# Patient Record
Sex: MALE | Race: BLACK OR AFRICAN AMERICAN
[De-identification: names, ages, dates, MRNs, and addresses within clinical notes are randomized per-mention and may not be internally consistent; named-entity substitution may affect disease eponyms.]

---

## 2017-07-09 ENCOUNTER — HOSPITAL ENCOUNTER (EMERGENCY)
Dept: HOSPITAL 23 - CED | Age: 57
LOS: 1 days | Discharge: HOME | End: 2017-07-10
Payer: COMMERCIAL

## 2017-07-09 DIAGNOSIS — Z79.4: ICD-10-CM

## 2017-07-09 DIAGNOSIS — Z79.899: ICD-10-CM

## 2017-07-09 DIAGNOSIS — E78.5: ICD-10-CM

## 2017-07-09 DIAGNOSIS — I10: ICD-10-CM

## 2017-07-09 DIAGNOSIS — Z79.82: ICD-10-CM

## 2017-07-09 DIAGNOSIS — E11.9: ICD-10-CM

## 2017-07-09 DIAGNOSIS — R10.32: Primary | ICD-10-CM

## 2017-07-09 DIAGNOSIS — G40.909: ICD-10-CM

## 2017-07-09 DIAGNOSIS — Z98.890: ICD-10-CM

## 2017-07-09 DIAGNOSIS — R11.2: ICD-10-CM

## 2017-07-09 LAB
BARBITURATES UR QL SCN: 0.6 MG/DL (ref 0.2–2)
BARBITURATES UR QL SCN: 4.2 G/DL (ref 3.5–5)
BASOPHIL#: 0.1 X10E3 (ref 0–0.3)
BASOPHIL%: 0.6 % (ref 0–2.5)
BENZODIAZ UR QL SCN: 51 U/L (ref 10–42)
BENZODIAZ UR QL SCN: 65 U/L (ref 10–40)
BLOOD UREA NITROGEN: 15 MG/DL (ref 9–23)
BUN/CREATININE RATIO: 10.71
BZE UR QL SCN: 86 U/L (ref 32–92)
CALCIUM SERUM: 8.6 MG/DL (ref 8.4–10.2)
CK MB SERPL-RTO: 15.3 % (ref 11–15.5)
CK MB SERPL-RTO: 33.4 G/DL (ref 30–36)
CREATININE SERUM: 1.4 MG/DL (ref 0.6–1.4)
DIFF IND: NO
EOSINOPHIL#: 0.2 X10E3 (ref 0–0.7)
EOSINOPHIL%: 1.7 % (ref 0–7)
GENTAMICIN PEAK SERPL-MCNC: NO MG/L
GLOM FILT RATE ESTIMATED: 64.2 ML/MIN (ref 60–?)
GLUCOSE FASTING: 234 MG/DL (ref 70–110)
HEMATOCRIT: 40.6 % (ref 38–50)
HEMOGLOBIN: 13.5 GM/DL (ref 13–16)
KETONES UR QL: 101 MMOL/L (ref 100–111)
KETONES UR QL: 25 MMOL/L (ref 22–31)
LIPASE: 13 U/L (ref 22–51)
LYMPHOCYTE#: 1.7 X10E3 (ref 1–3.5)
LYMPHOCYTE%: 16.7 % (ref 17–45)
MEAN CELL VOLUME: 90 FL (ref 83–96)
MEAN CORPUSCULAR HEMOGLOBIN: 30 PG (ref 28–34)
MEAN PLATELET VOLUME: 10.9 FL (ref 6.5–11.5)
MONOCYTE#: 0.7 X10E3 (ref 0–1)
MONOCYTE%: 7.3 % (ref 3–12)
NEUTROPHIL#: 7.4 X10E3 (ref 1.5–7.1)
NEUTROPHIL%: 73.7 % (ref 40–75)
PLATELET COUNT: 156 X10E3 (ref 140–420)
POTASSIUM: 4 MMOL/L (ref 3.5–5.1)
PROTEIN TOTAL SERUM: 8 G/DL (ref 6–8.3)
RED BLOOD COUNT: 4.51 X10E (ref 3.9–5.6)
SODIUM: 134 MMOL/L (ref 135–145)
U HYALINE CASTS AUWI: (no result) /[LPF]
URBCS1 AUWI: (no result) /[HPF] (ref 0–2)
URINE APPEARANCE: CLEAR
URINE BACTERIA AUWI: (no result)
URINE BILIRUBIN: (no result)
URINE BLOOD: (no result)
URINE COLOR: (no result)
URINE GLUCOSE: >1000 MG/DL
URINE KETONE: (no result)
URINE LEUKOCYTE ESTERASE: (no result)
URINE NITRATE: (no result)
URINE PH: 5.5 (ref 5–8)
URINE PROTEIN: (no result)
URINE SOURCE: (no result)
URINE SPECIFIC GRAVITY: 1.02 (ref 1–1.03)
URINE SQUAMOUS EPITHELIAL CELL: (no result) /[HPF]
URINE UROBILINOGEN: 1 MG/DL
UWBCS1 AUWI: (no result) (ref 0–5)
WHITE BLOOD COUNT: 10 X10E3 (ref 4–10.5)

## 2020-04-03 VITALS — HEIGHT: 65 IN | WEIGHT: 230 LBS

## 2020-04-06 ENCOUNTER — OFFICE (OUTPATIENT)
Dept: URBAN - METROPOLITAN AREA CLINIC 75 | Facility: CLINIC | Age: 60
End: 2020-04-06
Payer: MEDICAID

## 2020-04-06 DIAGNOSIS — R13.10 DYSPHAGIA, UNSPECIFIED: ICD-10-CM

## 2020-04-06 PROCEDURE — 99202 OFFICE O/P NEW SF 15 MIN: CPT | Mod: 95 | Performed by: INTERNAL MEDICINE

## 2020-04-27 VITALS — HEIGHT: 65 IN | WEIGHT: 230 LBS

## 2020-04-28 ENCOUNTER — OFFICE (OUTPATIENT)
Dept: URBAN - METROPOLITAN AREA CLINIC 75 | Facility: CLINIC | Age: 60
End: 2020-04-28
Payer: MEDICAID

## 2020-04-28 DIAGNOSIS — Z12.11 ENCOUNTER FOR SCREENING FOR MALIGNANT NEOPLASM OF COLON: ICD-10-CM

## 2020-04-28 DIAGNOSIS — R13.10 DYSPHAGIA, UNSPECIFIED: ICD-10-CM

## 2020-04-28 DIAGNOSIS — D64.9 ANEMIA, UNSPECIFIED: ICD-10-CM

## 2020-04-28 PROCEDURE — 99213 OFFICE O/P EST LOW 20 MIN: CPT | Mod: 95 | Performed by: INTERNAL MEDICINE

## 2021-08-09 ENCOUNTER — OFFICE (OUTPATIENT)
Dept: URBAN - METROPOLITAN AREA CLINIC 75 | Facility: CLINIC | Age: 61
End: 2021-08-09
Payer: MEDICAID

## 2021-08-09 VITALS — WEIGHT: 204 LBS | SYSTOLIC BLOOD PRESSURE: 130 MMHG | HEIGHT: 65 IN | DIASTOLIC BLOOD PRESSURE: 84 MMHG

## 2021-08-09 DIAGNOSIS — D64.9 ANEMIA, UNSPECIFIED: ICD-10-CM

## 2021-08-09 DIAGNOSIS — R13.10 DYSPHAGIA, UNSPECIFIED: ICD-10-CM

## 2021-08-09 PROCEDURE — 99214 OFFICE O/P EST MOD 30 MIN: CPT | Performed by: NURSE PRACTITIONER

## 2021-09-10 VITALS
HEIGHT: 65 IN | DIASTOLIC BLOOD PRESSURE: 89 MMHG | TEMPERATURE: 97.2 F | RESPIRATION RATE: 16 BRPM | HEART RATE: 72 BPM | RESPIRATION RATE: 12 BRPM | DIASTOLIC BLOOD PRESSURE: 66 MMHG | RESPIRATION RATE: 8 BRPM | RESPIRATION RATE: 18 BRPM | SYSTOLIC BLOOD PRESSURE: 199 MMHG | OXYGEN SATURATION: 99 % | DIASTOLIC BLOOD PRESSURE: 80 MMHG | DIASTOLIC BLOOD PRESSURE: 108 MMHG | SYSTOLIC BLOOD PRESSURE: 172 MMHG | HEART RATE: 80 BPM | RESPIRATION RATE: 14 BRPM | OXYGEN SATURATION: 100 % | DIASTOLIC BLOOD PRESSURE: 54 MMHG | HEART RATE: 74 BPM | OXYGEN SATURATION: 97 % | HEART RATE: 81 BPM | TEMPERATURE: 97.6 F | SYSTOLIC BLOOD PRESSURE: 122 MMHG | OXYGEN SATURATION: 75 % | DIASTOLIC BLOOD PRESSURE: 65 MMHG | HEART RATE: 69 BPM | SYSTOLIC BLOOD PRESSURE: 154 MMHG | OXYGEN SATURATION: 98 % | RESPIRATION RATE: 15 BRPM | WEIGHT: 204 LBS | HEART RATE: 79 BPM | SYSTOLIC BLOOD PRESSURE: 91 MMHG | DIASTOLIC BLOOD PRESSURE: 112 MMHG | SYSTOLIC BLOOD PRESSURE: 116 MMHG | HEART RATE: 76 BPM | HEART RATE: 75 BPM | SYSTOLIC BLOOD PRESSURE: 185 MMHG | DIASTOLIC BLOOD PRESSURE: 86 MMHG | HEART RATE: 71 BPM | RESPIRATION RATE: 10 BRPM | SYSTOLIC BLOOD PRESSURE: 150 MMHG

## 2021-09-13 ENCOUNTER — AMBULATORY SURGICAL CENTER (OUTPATIENT)
Dept: URBAN - METROPOLITAN AREA SURGERY 17 | Facility: SURGERY | Age: 61
End: 2021-09-13
Payer: MEDICAID

## 2021-09-13 ENCOUNTER — OFFICE (OUTPATIENT)
Dept: URBAN - METROPOLITAN AREA PATHOLOGY 4 | Facility: PATHOLOGY | Age: 61
End: 2021-09-13
Payer: MEDICAID

## 2021-09-13 DIAGNOSIS — K21.00 GASTRO-ESOPHAGEAL REFLUX DISEASE WITH ESOPHAGITIS, WITHOUT B: ICD-10-CM

## 2021-09-13 DIAGNOSIS — Z53.8 PROCEDURE AND TREATMENT NOT CARRIED OUT FOR OTHER REASONS: ICD-10-CM

## 2021-09-13 DIAGNOSIS — K44.9 DIAPHRAGMATIC HERNIA WITHOUT OBSTRUCTION OR GANGRENE: ICD-10-CM

## 2021-09-13 DIAGNOSIS — K22.2 ESOPHAGEAL OBSTRUCTION: ICD-10-CM

## 2021-09-13 DIAGNOSIS — D37.1 NEOPLASM OF UNCERTAIN BEHAVIOR OF STOMACH: ICD-10-CM

## 2021-09-13 DIAGNOSIS — R13.10 DYSPHAGIA, UNSPECIFIED: ICD-10-CM

## 2021-09-13 DIAGNOSIS — Z12.11 ENCOUNTER FOR SCREENING FOR MALIGNANT NEOPLASM OF COLON: ICD-10-CM

## 2021-09-13 DIAGNOSIS — R10.13 EPIGASTRIC PAIN: ICD-10-CM

## 2021-09-13 PROBLEM — K22.8 OTHER SPECIFIED DISEASES OF ESOPHAGUS: Status: ACTIVE | Noted: 2021-09-13

## 2021-09-13 LAB
GI HISTOLOGY: A. UNSPECIFIED: (no result)
GI HISTOLOGY: PDF REPORT: (no result)

## 2021-09-13 PROCEDURE — 88305 TISSUE EXAM BY PATHOLOGIST: CPT | Performed by: INTERNAL MEDICINE

## 2021-09-13 PROCEDURE — 45330 DIAGNOSTIC SIGMOIDOSCOPY: CPT | Performed by: INTERNAL MEDICINE

## 2021-09-13 PROCEDURE — 43239 EGD BIOPSY SINGLE/MULTIPLE: CPT | Mod: 59 | Performed by: INTERNAL MEDICINE

## 2021-09-13 PROCEDURE — 43249 ESOPH EGD DILATION <30 MM: CPT | Performed by: INTERNAL MEDICINE

## 2021-11-22 VITALS
SYSTOLIC BLOOD PRESSURE: 181 MMHG | RESPIRATION RATE: 17 BRPM | OXYGEN SATURATION: 100 % | RESPIRATION RATE: 12 BRPM | DIASTOLIC BLOOD PRESSURE: 99 MMHG | DIASTOLIC BLOOD PRESSURE: 100 MMHG | DIASTOLIC BLOOD PRESSURE: 52 MMHG | HEART RATE: 78 BPM | TEMPERATURE: 97.6 F | DIASTOLIC BLOOD PRESSURE: 59 MMHG | HEART RATE: 73 BPM | DIASTOLIC BLOOD PRESSURE: 60 MMHG | SYSTOLIC BLOOD PRESSURE: 193 MMHG | DIASTOLIC BLOOD PRESSURE: 88 MMHG | SYSTOLIC BLOOD PRESSURE: 127 MMHG | DIASTOLIC BLOOD PRESSURE: 48 MMHG | SYSTOLIC BLOOD PRESSURE: 150 MMHG | HEART RATE: 65 BPM | SYSTOLIC BLOOD PRESSURE: 148 MMHG | SYSTOLIC BLOOD PRESSURE: 130 MMHG | HEIGHT: 65 IN | TEMPERATURE: 97.2 F | RESPIRATION RATE: 13 BRPM | RESPIRATION RATE: 18 BRPM | WEIGHT: 204 LBS | DIASTOLIC BLOOD PRESSURE: 67 MMHG | HEART RATE: 71 BPM | SYSTOLIC BLOOD PRESSURE: 195 MMHG | SYSTOLIC BLOOD PRESSURE: 212 MMHG | SYSTOLIC BLOOD PRESSURE: 147 MMHG | SYSTOLIC BLOOD PRESSURE: 152 MMHG | OXYGEN SATURATION: 99 % | HEART RATE: 69 BPM | DIASTOLIC BLOOD PRESSURE: 58 MMHG | RESPIRATION RATE: 15 BRPM | HEART RATE: 72 BPM | DIASTOLIC BLOOD PRESSURE: 84 MMHG | HEART RATE: 76 BPM | RESPIRATION RATE: 9 BRPM | RESPIRATION RATE: 11 BRPM

## 2021-11-29 ENCOUNTER — OFFICE (OUTPATIENT)
Dept: URBAN - METROPOLITAN AREA PATHOLOGY 4 | Facility: PATHOLOGY | Age: 61
End: 2021-11-29
Payer: MEDICAID

## 2021-11-29 ENCOUNTER — AMBULATORY SURGICAL CENTER (OUTPATIENT)
Dept: URBAN - METROPOLITAN AREA SURGERY 17 | Facility: SURGERY | Age: 61
End: 2021-11-29
Payer: MEDICAID

## 2021-11-29 DIAGNOSIS — D12.2 BENIGN NEOPLASM OF ASCENDING COLON: ICD-10-CM

## 2021-11-29 DIAGNOSIS — D12.4 BENIGN NEOPLASM OF DESCENDING COLON: ICD-10-CM

## 2021-11-29 DIAGNOSIS — K64.8 OTHER HEMORRHOIDS: ICD-10-CM

## 2021-11-29 DIAGNOSIS — Z12.11 ENCOUNTER FOR SCREENING FOR MALIGNANT NEOPLASM OF COLON: ICD-10-CM

## 2021-11-29 DIAGNOSIS — K57.30 DIVERTICULOSIS OF LARGE INTESTINE WITHOUT PERFORATION OR ABS: ICD-10-CM

## 2021-11-29 LAB
GI HISTOLOGY: A. UNSPECIFIED: (no result)
GI HISTOLOGY: B. UNSPECIFIED: (no result)
GI HISTOLOGY: PDF REPORT: (no result)

## 2021-11-29 PROCEDURE — 88305 TISSUE EXAM BY PATHOLOGIST: CPT | Performed by: INTERNAL MEDICINE

## 2021-11-29 PROCEDURE — 45385 COLONOSCOPY W/LESION REMOVAL: CPT | Mod: 33 | Performed by: INTERNAL MEDICINE

## 2021-11-29 NOTE — SERVICEHPINOTES
SUSI BORGES  is a  61  male   who presents today for a  Colonoscopy   for   the indications listed below. The updated Patient Profile was reviewed prior to the procedure, in conjunction with the Physical Exam, including medical conditions, surgical procedures, medications, allergies, family history and social history. See Physical Exam time stamp below for date and time of HPI completion.Pre-operatively, I reviewed the indication(s) for the procedure, the risks of the procedure [including but not limited to: unexpected bleeding possibly requiring hospitalization and/or unplanned repeat procedures, perforation possibly requiring surgical treatment, missed lesions and complications of sedation/MAC (also explained by anesthesia staff)]. I have evaluated the patient for risks associated with the planned anesthesia and the procedure to be performed and find the patient an acceptable candidate for IV sedation.Multiple opportunities were provided for any questions or concerns, and all questions were answered satisfactorily before any anesthesia was administered. We will proceed with the planned procedure.br

## 2021-11-29 NOTE — SERVICENOTES
The patient was informed that this exam is not 100% accurate, depending on many factors (such as the preparation). Small lesions can be missed. Please call if symptoms persist or new symptoms develop. The right colon was examined twice. Unfortunately, patient did not follow instructions for 2 day prep. Will plan to do OV prior to his next colonoscopy to go over prep instructions.

## 2022-01-13 ENCOUNTER — OFFICE (OUTPATIENT)
Dept: URBAN - METROPOLITAN AREA CLINIC 75 | Facility: CLINIC | Age: 62
End: 2022-01-13
Payer: MEDICAID

## 2022-01-13 VITALS
WEIGHT: 200 LBS | HEIGHT: 65 IN | SYSTOLIC BLOOD PRESSURE: 144 MMHG | HEART RATE: 61 BPM | OXYGEN SATURATION: 93 % | DIASTOLIC BLOOD PRESSURE: 62 MMHG

## 2022-01-13 DIAGNOSIS — R10.13 EPIGASTRIC PAIN: ICD-10-CM

## 2022-01-13 PROBLEM — K63.5 POLYP OF COLON: Status: ACTIVE | Noted: 2021-11-29

## 2022-01-13 PROCEDURE — 99214 OFFICE O/P EST MOD 30 MIN: CPT | Performed by: NURSE PRACTITIONER

## 2023-02-22 ENCOUNTER — HOSPITAL ENCOUNTER (OUTPATIENT)
Facility: HOSPITAL | Age: 63
Setting detail: OBSERVATION
Discharge: HOME | End: 2023-02-23
Attending: EMERGENCY MEDICINE | Admitting: HOSPITALIST
Payer: COMMERCIAL

## 2023-02-22 ENCOUNTER — APPOINTMENT (OUTPATIENT)
Dept: CARDIOLOGY | Facility: HOSPITAL | Age: 63
End: 2023-02-22
Payer: COMMERCIAL

## 2023-02-22 ENCOUNTER — APPOINTMENT (OUTPATIENT)
Dept: GENERAL RADIOLOGY | Facility: HOSPITAL | Age: 63
End: 2023-02-22
Payer: COMMERCIAL

## 2023-02-22 DIAGNOSIS — R07.9 CHEST PAIN, UNSPECIFIED TYPE: Primary | ICD-10-CM

## 2023-02-22 DIAGNOSIS — E11.65 POORLY CONTROLLED DIABETES MELLITUS: ICD-10-CM

## 2023-02-22 DIAGNOSIS — R73.9 HYPERGLYCEMIA: ICD-10-CM

## 2023-02-22 PROBLEM — N18.2 CKD (CHRONIC KIDNEY DISEASE) STAGE 2, GFR 60-89 ML/MIN: Status: ACTIVE | Noted: 2023-02-22

## 2023-02-22 PROBLEM — G40.909 SEIZURE DISORDER: Status: ACTIVE | Noted: 2023-02-22

## 2023-02-22 PROBLEM — E66.9 OBESITY (BMI 30-39.9): Status: ACTIVE | Noted: 2023-02-22

## 2023-02-22 PROBLEM — D63.8 ANEMIA, CHRONIC DISEASE: Status: ACTIVE | Noted: 2023-02-22

## 2023-02-22 PROBLEM — I10 HTN (HYPERTENSION): Status: ACTIVE | Noted: 2023-02-22

## 2023-02-22 LAB
ALBUMIN SERPL-MCNC: 4.3 G/DL (ref 3.5–5.2)
ALBUMIN/GLOB SERPL: 1.6 G/DL
ALP SERPL-CCNC: 69 U/L (ref 39–117)
ALT SERPL W P-5'-P-CCNC: 18 U/L (ref 1–41)
ANION GAP SERPL CALCULATED.3IONS-SCNC: 8.1 MMOL/L (ref 5–15)
ANION GAP SERPL CALCULATED.3IONS-SCNC: 9.6 MMOL/L (ref 5–15)
AST SERPL-CCNC: 12 U/L (ref 1–40)
BASOPHILS # BLD AUTO: 0.05 10*3/MM3 (ref 0–0.2)
BASOPHILS NFR BLD AUTO: 0.7 % (ref 0–1.5)
BILIRUB SERPL-MCNC: 0.3 MG/DL (ref 0–1.2)
BUN SERPL-MCNC: 16 MG/DL (ref 8–23)
BUN SERPL-MCNC: 18 MG/DL (ref 8–23)
BUN/CREAT SERPL: 12.3 (ref 7–25)
BUN/CREAT SERPL: 14.1 (ref 7–25)
CALCIUM SPEC-SCNC: 8.9 MG/DL (ref 8.6–10.5)
CALCIUM SPEC-SCNC: 9.1 MG/DL (ref 8.6–10.5)
CHLORIDE SERPL-SCNC: 101 MMOL/L (ref 98–107)
CHLORIDE SERPL-SCNC: 106 MMOL/L (ref 98–107)
CO2 SERPL-SCNC: 25.4 MMOL/L (ref 22–29)
CO2 SERPL-SCNC: 25.9 MMOL/L (ref 22–29)
CREAT SERPL-MCNC: 1.28 MG/DL (ref 0.76–1.27)
CREAT SERPL-MCNC: 1.3 MG/DL (ref 0.76–1.27)
DEPRECATED RDW RBC AUTO: 46.4 FL (ref 37–54)
DEPRECATED RDW RBC AUTO: 46.7 FL (ref 37–54)
EGFRCR SERPLBLD CKD-EPI 2021: 61.7 ML/MIN/1.73
EGFRCR SERPLBLD CKD-EPI 2021: 62.9 ML/MIN/1.73
EOSINOPHIL # BLD AUTO: 0.36 10*3/MM3 (ref 0–0.4)
EOSINOPHIL NFR BLD AUTO: 4.7 % (ref 0.3–6.2)
ERYTHROCYTE [DISTWIDTH] IN BLOOD BY AUTOMATED COUNT: 13.7 % (ref 12.3–15.4)
ERYTHROCYTE [DISTWIDTH] IN BLOOD BY AUTOMATED COUNT: 13.8 % (ref 12.3–15.4)
GEN 5 2HR TROPONIN T REFLEX: 25 NG/L
GLOBULIN UR ELPH-MCNC: 2.7 GM/DL
GLUCOSE BLDC GLUCOMTR-MCNC: 134 MG/DL (ref 70–130)
GLUCOSE BLDC GLUCOMTR-MCNC: 229 MG/DL (ref 70–130)
GLUCOSE BLDC GLUCOMTR-MCNC: 272 MG/DL (ref 70–130)
GLUCOSE BLDC GLUCOMTR-MCNC: 297 MG/DL (ref 70–130)
GLUCOSE SERPL-MCNC: 146 MG/DL (ref 65–99)
GLUCOSE SERPL-MCNC: 190 MG/DL (ref 65–99)
HBA1C MFR BLD: 12.3 % (ref 4.8–5.6)
HCT VFR BLD AUTO: 34.8 % (ref 37.5–51)
HCT VFR BLD AUTO: 35.5 % (ref 37.5–51)
HGB BLD-MCNC: 11.6 G/DL (ref 13–17.7)
HGB BLD-MCNC: 11.9 G/DL (ref 13–17.7)
LYMPHOCYTES # BLD AUTO: 1.83 10*3/MM3 (ref 0.7–3.1)
LYMPHOCYTES NFR BLD AUTO: 23.8 % (ref 19.6–45.3)
MAGNESIUM SERPL-MCNC: 2 MG/DL (ref 1.6–2.4)
MCH RBC QN AUTO: 31.2 PG (ref 26.6–33)
MCH RBC QN AUTO: 31.2 PG (ref 26.6–33)
MCHC RBC AUTO-ENTMCNC: 33.3 G/DL (ref 31.5–35.7)
MCHC RBC AUTO-ENTMCNC: 33.5 G/DL (ref 31.5–35.7)
MCV RBC AUTO: 92.9 FL (ref 79–97)
MCV RBC AUTO: 93.5 FL (ref 79–97)
MONOCYTES # BLD AUTO: 0.72 10*3/MM3 (ref 0.1–0.9)
MONOCYTES NFR BLD AUTO: 9.4 % (ref 5–12)
NEUTROPHILS NFR BLD AUTO: 4.69 10*3/MM3 (ref 1.7–7)
NEUTROPHILS NFR BLD AUTO: 60.9 % (ref 42.7–76)
PLATELET # BLD AUTO: 158 10*3/MM3 (ref 140–450)
PLATELET # BLD AUTO: 175 10*3/MM3 (ref 140–450)
PMV BLD AUTO: 11.3 FL (ref 6–12)
PMV BLD AUTO: 11.5 FL (ref 6–12)
POTASSIUM SERPL-SCNC: 4.3 MMOL/L (ref 3.5–5.2)
POTASSIUM SERPL-SCNC: 4.6 MMOL/L (ref 3.5–5.2)
PROT SERPL-MCNC: 7 G/DL (ref 6–8.5)
QT INTERVAL: 440 MS
RBC # BLD AUTO: 3.72 10*6/MM3 (ref 4.14–5.8)
RBC # BLD AUTO: 3.82 10*6/MM3 (ref 4.14–5.8)
SODIUM SERPL-SCNC: 135 MMOL/L (ref 136–145)
SODIUM SERPL-SCNC: 141 MMOL/L (ref 136–145)
TROPONIN T DELTA: 2 NG/L
TROPONIN T SERPL HS-MCNC: 23 NG/L
WBC NRBC COR # BLD: 7.45 10*3/MM3 (ref 3.4–10.8)
WBC NRBC COR # BLD: 7.69 10*3/MM3 (ref 3.4–10.8)

## 2023-02-22 PROCEDURE — 25010000002 ONDANSETRON PER 1 MG: Performed by: EMERGENCY MEDICINE

## 2023-02-22 PROCEDURE — G0378 HOSPITAL OBSERVATION PER HR: HCPCS

## 2023-02-22 PROCEDURE — 71045 X-RAY EXAM CHEST 1 VIEW: CPT

## 2023-02-22 PROCEDURE — 96375 TX/PRO/DX INJ NEW DRUG ADDON: CPT

## 2023-02-22 PROCEDURE — 36415 COLL VENOUS BLD VENIPUNCTURE: CPT

## 2023-02-22 PROCEDURE — 63710000001 INSULIN GLARGINE PER 5 UNITS: Performed by: NURSE PRACTITIONER

## 2023-02-22 PROCEDURE — 80053 COMPREHEN METABOLIC PANEL: CPT | Performed by: PHYSICIAN ASSISTANT

## 2023-02-22 PROCEDURE — 93005 ELECTROCARDIOGRAM TRACING: CPT

## 2023-02-22 PROCEDURE — 93306 TTE W/DOPPLER COMPLETE: CPT

## 2023-02-22 PROCEDURE — 93010 ELECTROCARDIOGRAM REPORT: CPT | Performed by: STUDENT IN AN ORGANIZED HEALTH CARE EDUCATION/TRAINING PROGRAM

## 2023-02-22 PROCEDURE — 96374 THER/PROPH/DIAG INJ IV PUSH: CPT

## 2023-02-22 PROCEDURE — 84484 ASSAY OF TROPONIN QUANT: CPT | Performed by: PHYSICIAN ASSISTANT

## 2023-02-22 PROCEDURE — 63710000001 INSULIN LISPRO (HUMAN) PER 5 UNITS: Performed by: NURSE PRACTITIONER

## 2023-02-22 PROCEDURE — 25010000002 MORPHINE PER 10 MG: Performed by: EMERGENCY MEDICINE

## 2023-02-22 PROCEDURE — 85025 COMPLETE CBC W/AUTO DIFF WBC: CPT | Performed by: PHYSICIAN ASSISTANT

## 2023-02-22 PROCEDURE — 83036 HEMOGLOBIN GLYCOSYLATED A1C: CPT | Performed by: NURSE PRACTITIONER

## 2023-02-22 PROCEDURE — 82962 GLUCOSE BLOOD TEST: CPT

## 2023-02-22 PROCEDURE — 85027 COMPLETE CBC AUTOMATED: CPT | Performed by: NURSE PRACTITIONER

## 2023-02-22 PROCEDURE — 83735 ASSAY OF MAGNESIUM: CPT | Performed by: PHYSICIAN ASSISTANT

## 2023-02-22 PROCEDURE — 99285 EMERGENCY DEPT VISIT HI MDM: CPT

## 2023-02-22 RX ORDER — PANTOPRAZOLE SODIUM 40 MG/1
40 TABLET, DELAYED RELEASE ORAL
Status: DISCONTINUED | OUTPATIENT
Start: 2023-02-22 | End: 2023-02-23 | Stop reason: HOSPADM

## 2023-02-22 RX ORDER — SODIUM CHLORIDE 0.9 % (FLUSH) 0.9 %
10 SYRINGE (ML) INJECTION AS NEEDED
Status: DISCONTINUED | OUTPATIENT
Start: 2023-02-22 | End: 2023-02-23

## 2023-02-22 RX ORDER — GABAPENTIN 400 MG/1
800 CAPSULE ORAL 3 TIMES DAILY PRN
COMMUNITY

## 2023-02-22 RX ORDER — PANTOPRAZOLE SODIUM 20 MG/1
20 TABLET, DELAYED RELEASE ORAL DAILY
COMMUNITY

## 2023-02-22 RX ORDER — ACETAMINOPHEN 325 MG/1
650 TABLET ORAL EVERY 4 HOURS PRN
Status: DISCONTINUED | OUTPATIENT
Start: 2023-02-22 | End: 2023-02-23

## 2023-02-22 RX ORDER — ONDANSETRON 4 MG/1
4 TABLET, FILM COATED ORAL EVERY 6 HOURS PRN
Status: DISCONTINUED | OUTPATIENT
Start: 2023-02-22 | End: 2023-02-23

## 2023-02-22 RX ORDER — AMLODIPINE BESYLATE 10 MG/1
10 TABLET ORAL DAILY
COMMUNITY

## 2023-02-22 RX ORDER — NITROGLYCERIN 0.4 MG/1
0.4 TABLET SUBLINGUAL
Status: DISCONTINUED | OUTPATIENT
Start: 2023-02-22 | End: 2023-02-23

## 2023-02-22 RX ORDER — HYDROCODONE BITARTRATE AND ACETAMINOPHEN 7.5; 325 MG/1; MG/1
1 TABLET ORAL EVERY 8 HOURS PRN
Status: DISCONTINUED | OUTPATIENT
Start: 2023-02-22 | End: 2023-02-23

## 2023-02-22 RX ORDER — ONDANSETRON 2 MG/ML
4 INJECTION INTRAMUSCULAR; INTRAVENOUS ONCE
Status: COMPLETED | OUTPATIENT
Start: 2023-02-22 | End: 2023-02-22

## 2023-02-22 RX ORDER — ACETAMINOPHEN 160 MG/5ML
650 SOLUTION ORAL EVERY 4 HOURS PRN
Status: DISCONTINUED | OUTPATIENT
Start: 2023-02-22 | End: 2023-02-23

## 2023-02-22 RX ORDER — ISOSORBIDE MONONITRATE 30 MG/1
120 TABLET, EXTENDED RELEASE ORAL DAILY
Status: DISCONTINUED | OUTPATIENT
Start: 2023-02-22 | End: 2023-02-23 | Stop reason: HOSPADM

## 2023-02-22 RX ORDER — ONDANSETRON 2 MG/ML
4 INJECTION INTRAMUSCULAR; INTRAVENOUS EVERY 6 HOURS PRN
Status: DISCONTINUED | OUTPATIENT
Start: 2023-02-22 | End: 2023-02-23

## 2023-02-22 RX ORDER — INSULIN LISPRO 100 [IU]/ML
0-9 INJECTION, SOLUTION INTRAVENOUS; SUBCUTANEOUS
Status: DISCONTINUED | OUTPATIENT
Start: 2023-02-22 | End: 2023-02-23 | Stop reason: HOSPADM

## 2023-02-22 RX ORDER — MORPHINE SULFATE 2 MG/ML
4 INJECTION, SOLUTION INTRAMUSCULAR; INTRAVENOUS ONCE
Status: COMPLETED | OUTPATIENT
Start: 2023-02-22 | End: 2023-02-22

## 2023-02-22 RX ORDER — INSULIN GLARGINE 100 [IU]/ML
30 INJECTION, SOLUTION SUBCUTANEOUS NIGHTLY
Status: DISCONTINUED | OUTPATIENT
Start: 2023-02-22 | End: 2023-02-22

## 2023-02-22 RX ORDER — SODIUM CHLORIDE 9 MG/ML
40 INJECTION, SOLUTION INTRAVENOUS AS NEEDED
Status: DISCONTINUED | OUTPATIENT
Start: 2023-02-22 | End: 2023-02-23

## 2023-02-22 RX ORDER — OMEPRAZOLE 40 MG/1
40 CAPSULE, DELAYED RELEASE ORAL DAILY
COMMUNITY
End: 2023-02-23 | Stop reason: HOSPADM

## 2023-02-22 RX ORDER — SODIUM CHLORIDE 0.9 % (FLUSH) 0.9 %
10 SYRINGE (ML) INJECTION EVERY 12 HOURS SCHEDULED
Status: DISCONTINUED | OUTPATIENT
Start: 2023-02-22 | End: 2023-02-23 | Stop reason: HOSPADM

## 2023-02-22 RX ORDER — CHLORTHALIDONE 25 MG/1
25 TABLET ORAL DAILY
Status: DISCONTINUED | OUTPATIENT
Start: 2023-02-22 | End: 2023-02-23 | Stop reason: HOSPADM

## 2023-02-22 RX ORDER — GABAPENTIN 400 MG/1
800 CAPSULE ORAL 3 TIMES DAILY PRN
Status: DISCONTINUED | OUTPATIENT
Start: 2023-02-22 | End: 2023-02-23

## 2023-02-22 RX ORDER — MONTELUKAST SODIUM 10 MG/1
10 TABLET ORAL NIGHTLY
Status: DISCONTINUED | OUTPATIENT
Start: 2023-02-22 | End: 2023-02-23 | Stop reason: HOSPADM

## 2023-02-22 RX ORDER — INSULIN GLARGINE 100 [IU]/ML
75 INJECTION, SOLUTION SUBCUTANEOUS NIGHTLY
COMMUNITY

## 2023-02-22 RX ORDER — LEVETIRACETAM 500 MG/1
500 TABLET ORAL 2 TIMES DAILY
COMMUNITY

## 2023-02-22 RX ORDER — ALLOPURINOL 100 MG/1
100 TABLET ORAL DAILY
Status: DISCONTINUED | OUTPATIENT
Start: 2023-02-22 | End: 2023-02-23 | Stop reason: HOSPADM

## 2023-02-22 RX ORDER — CLOPIDOGREL BISULFATE 75 MG/1
75 TABLET ORAL DAILY
COMMUNITY

## 2023-02-22 RX ORDER — CALCIUM CARBONATE 200(500)MG
2 TABLET,CHEWABLE ORAL 2 TIMES DAILY PRN
Status: DISCONTINUED | OUTPATIENT
Start: 2023-02-22 | End: 2023-02-23

## 2023-02-22 RX ORDER — METOPROLOL TARTRATE 50 MG/1
50 TABLET, FILM COATED ORAL 2 TIMES DAILY
COMMUNITY

## 2023-02-22 RX ORDER — INSULIN LISPRO 100 [IU]/ML
3 INJECTION, SOLUTION INTRAVENOUS; SUBCUTANEOUS
Status: DISCONTINUED | OUTPATIENT
Start: 2023-02-22 | End: 2023-02-23 | Stop reason: HOSPADM

## 2023-02-22 RX ORDER — NITROGLYCERIN 0.4 MG/1
0.4 TABLET SUBLINGUAL
Status: DISCONTINUED | OUTPATIENT
Start: 2023-02-22 | End: 2023-02-22 | Stop reason: SDUPTHER

## 2023-02-22 RX ORDER — ISOSORBIDE MONONITRATE 120 MG/1
120 TABLET, EXTENDED RELEASE ORAL DAILY
COMMUNITY

## 2023-02-22 RX ORDER — ALLOPURINOL 100 MG/1
100 TABLET ORAL DAILY
COMMUNITY

## 2023-02-22 RX ORDER — HYDROCODONE BITARTRATE AND ACETAMINOPHEN 7.5; 325 MG/1; MG/1
1 TABLET ORAL EVERY 8 HOURS PRN
COMMUNITY

## 2023-02-22 RX ORDER — CLOPIDOGREL BISULFATE 75 MG/1
75 TABLET ORAL DAILY
Status: DISCONTINUED | OUTPATIENT
Start: 2023-02-22 | End: 2023-02-23 | Stop reason: HOSPADM

## 2023-02-22 RX ORDER — TRAZODONE HYDROCHLORIDE 100 MG/1
100 TABLET ORAL NIGHTLY
Status: DISCONTINUED | OUTPATIENT
Start: 2023-02-22 | End: 2023-02-23 | Stop reason: HOSPADM

## 2023-02-22 RX ORDER — TRAZODONE HYDROCHLORIDE 100 MG/1
100 TABLET ORAL NIGHTLY
COMMUNITY

## 2023-02-22 RX ORDER — ATORVASTATIN CALCIUM 10 MG/1
10 TABLET, FILM COATED ORAL DAILY
Status: DISCONTINUED | OUTPATIENT
Start: 2023-02-22 | End: 2023-02-23 | Stop reason: HOSPADM

## 2023-02-22 RX ORDER — LOSARTAN POTASSIUM 100 MG/1
100 TABLET ORAL DAILY
Status: DISCONTINUED | OUTPATIENT
Start: 2023-02-22 | End: 2023-02-23 | Stop reason: HOSPADM

## 2023-02-22 RX ORDER — SODIUM CHLORIDE 0.9 % (FLUSH) 0.9 %
10 SYRINGE (ML) INJECTION AS NEEDED
Status: DISCONTINUED | OUTPATIENT
Start: 2023-02-22 | End: 2023-02-23 | Stop reason: HOSPADM

## 2023-02-22 RX ORDER — INSULIN LISPRO 100 [IU]/ML
10 INJECTION, SOLUTION INTRAVENOUS; SUBCUTANEOUS
COMMUNITY

## 2023-02-22 RX ORDER — ATORVASTATIN CALCIUM 10 MG/1
10 TABLET, FILM COATED ORAL DAILY
COMMUNITY

## 2023-02-22 RX ORDER — NICOTINE POLACRILEX 4 MG
15 LOZENGE BUCCAL
Status: DISCONTINUED | OUTPATIENT
Start: 2023-02-22 | End: 2023-02-23

## 2023-02-22 RX ORDER — CHLORTHALIDONE 25 MG/1
25 TABLET ORAL DAILY
COMMUNITY

## 2023-02-22 RX ORDER — METOCLOPRAMIDE 5 MG/1
5 TABLET ORAL
COMMUNITY
End: 2023-02-23 | Stop reason: HOSPADM

## 2023-02-22 RX ORDER — LEVETIRACETAM 500 MG/1
500 TABLET ORAL 2 TIMES DAILY
Status: DISCONTINUED | OUTPATIENT
Start: 2023-02-22 | End: 2023-02-23 | Stop reason: HOSPADM

## 2023-02-22 RX ORDER — IBUPROFEN 600 MG/1
1 TABLET ORAL AS NEEDED
Status: DISCONTINUED | OUTPATIENT
Start: 2023-02-22 | End: 2023-02-23

## 2023-02-22 RX ORDER — AMLODIPINE BESYLATE 10 MG/1
10 TABLET ORAL DAILY
Status: DISCONTINUED | OUTPATIENT
Start: 2023-02-22 | End: 2023-02-23 | Stop reason: HOSPADM

## 2023-02-22 RX ORDER — LOSARTAN POTASSIUM 50 MG/1
100 TABLET ORAL DAILY
COMMUNITY

## 2023-02-22 RX ORDER — DEXTROSE MONOHYDRATE 25 G/50ML
25 INJECTION, SOLUTION INTRAVENOUS
Status: DISCONTINUED | OUTPATIENT
Start: 2023-02-22 | End: 2023-02-23

## 2023-02-22 RX ORDER — MONTELUKAST SODIUM 10 MG/1
10 TABLET ORAL NIGHTLY
COMMUNITY

## 2023-02-22 RX ORDER — INSULIN LISPRO 100 [IU]/ML
0-9 INJECTION, SOLUTION INTRAVENOUS; SUBCUTANEOUS
Status: DISCONTINUED | OUTPATIENT
Start: 2023-02-22 | End: 2023-02-22

## 2023-02-22 RX ORDER — INSULIN GLARGINE 100 [IU]/ML
15 INJECTION, SOLUTION SUBCUTANEOUS 2 TIMES DAILY
COMMUNITY
End: 2023-02-23 | Stop reason: HOSPADM

## 2023-02-22 RX ADMIN — INSULIN LISPRO 4 UNITS: 100 INJECTION, SOLUTION INTRAVENOUS; SUBCUTANEOUS at 18:10

## 2023-02-22 RX ADMIN — CLOPIDOGREL 75 MG: 75 TABLET, FILM COATED ORAL at 10:31

## 2023-02-22 RX ADMIN — LEVETIRACETAM 500 MG: 500 TABLET, FILM COATED ORAL at 20:25

## 2023-02-22 RX ADMIN — INSULIN GLARGINE-YFGN 30 UNITS: 100 INJECTION, SOLUTION SUBCUTANEOUS at 12:18

## 2023-02-22 RX ADMIN — INSULIN LISPRO 6 UNITS: 100 INJECTION, SOLUTION INTRAVENOUS; SUBCUTANEOUS at 20:32

## 2023-02-22 RX ADMIN — TRAZODONE HYDROCHLORIDE 100 MG: 100 TABLET ORAL at 22:10

## 2023-02-22 RX ADMIN — GABAPENTIN 800 MG: 400 CAPSULE ORAL at 20:35

## 2023-02-22 RX ADMIN — ALLOPURINOL 100 MG: 100 TABLET ORAL at 10:30

## 2023-02-22 RX ADMIN — MONTELUKAST SODIUM 10 MG: 10 TABLET, FILM COATED ORAL at 20:25

## 2023-02-22 RX ADMIN — Medication 10 ML: at 20:28

## 2023-02-22 RX ADMIN — AMLODIPINE BESYLATE 10 MG: 10 TABLET ORAL at 10:31

## 2023-02-22 RX ADMIN — INSULIN GLARGINE-YFGN 30 UNITS: 100 INJECTION, SOLUTION SUBCUTANEOUS at 21:18

## 2023-02-22 RX ADMIN — METOPROLOL TARTRATE 50 MG: 25 TABLET, FILM COATED ORAL at 10:30

## 2023-02-22 RX ADMIN — MORPHINE SULFATE 4 MG: 2 INJECTION, SOLUTION INTRAMUSCULAR; INTRAVENOUS at 02:20

## 2023-02-22 RX ADMIN — ONDANSETRON 4 MG: 2 INJECTION INTRAMUSCULAR; INTRAVENOUS at 02:20

## 2023-02-22 RX ADMIN — INSULIN LISPRO 6 UNITS: 100 INJECTION, SOLUTION INTRAVENOUS; SUBCUTANEOUS at 12:23

## 2023-02-22 RX ADMIN — PANTOPRAZOLE SODIUM 40 MG: 40 TABLET, DELAYED RELEASE ORAL at 10:30

## 2023-02-22 RX ADMIN — LEVETIRACETAM 500 MG: 500 TABLET, FILM COATED ORAL at 10:31

## 2023-02-22 RX ADMIN — NITROGLYCERIN 0.4 MG: 0.4 TABLET SUBLINGUAL at 01:26

## 2023-02-22 RX ADMIN — METOPROLOL TARTRATE 50 MG: 25 TABLET, FILM COATED ORAL at 20:25

## 2023-02-22 RX ADMIN — Medication 10 ML: at 08:48

## 2023-02-22 RX ADMIN — ATORVASTATIN CALCIUM 10 MG: 10 TABLET, FILM COATED ORAL at 10:31

## 2023-02-22 RX ADMIN — INSULIN LISPRO 3 UNITS: 100 INJECTION, SOLUTION INTRAVENOUS; SUBCUTANEOUS at 12:23

## 2023-02-22 RX ADMIN — LOSARTAN POTASSIUM 100 MG: 100 TABLET, FILM COATED ORAL at 10:30

## 2023-02-22 RX ADMIN — INSULIN LISPRO 3 UNITS: 100 INJECTION, SOLUTION INTRAVENOUS; SUBCUTANEOUS at 18:10

## 2023-02-22 RX ADMIN — ISOSORBIDE MONONITRATE 120 MG: 30 TABLET, EXTENDED RELEASE ORAL at 10:31

## 2023-02-22 NOTE — ED NOTES
"Pt arrived via [facility] c/o chest pain that started approx 90mins ago. Pt reports the chest pain to be a mid-sternal stabbing chest pain. Pt has hx of multiple MI and snow. Pt took 1 sublingual nitro PTA with \"slight relief\"   "

## 2023-02-22 NOTE — PROGRESS NOTES
Discharge Planning Assessment  [facility]     Patient Name: Eric Simms  MRN: [id number]  Today's Date: 2/22/2023    Admit Date: 2/22/2023    Plan: Home with girlfriend   Discharge Needs Assessment     Row Name 02/22/23 1416       Living Environment    People in Home other (see comments)  girlfriend Rin Carballo    Current Living Arrangements home    Primary Care Provided by self    Provides Primary Care For no one    Family Caregiver if Needed none    Quality of Family Relationships supportive;involved;helpful    Able to Return to Prior Arrangements yes       Transition Planning    Patient/Family Anticipates Transition to home with family;home    Transportation Anticipated public transportation;health plan transportation       Discharge Needs Assessment    Equipment Currently Used at Home cane, straight    Concerns to be Addressed denies needs/concerns at this time;no discharge needs identified               Discharge Plan     Row Name 02/22/23 1418       Plan    Plan Home with girlfriend    Plan Comments Spoke with pt at bedside to screen for DCP/needs.  Pt did confirm that he does reside at [facility] address where he does live with his girlfriend and does plan to return back home at [place].  Pt reports that he does use a cane to assist with ambualtion.  Pt did deny ever using HH or SNF in the past.  Pt did confirm hi sPCP as Dr. Erlin Madrigal and he uses [facility] [address].  Pt denied having any issues getting or affording his home meds.  [facility] did provide pt with phone number for [employer] [employer] for pt is use under his [facility] for medical appointments after DC.  Pt did state that his girlfriend does not drive and he has no family in [place] and her will need transportation home at [place].   CCP will follow if any DC needs do arise.              Continued Care and Services - Admitted Since 2/22/2023    Coordination has not been started for this encounter.          Demographic Summary     Row  Name 02/22/23 1416       General Information    Admission Type observation    Arrived From home    Referral Source admission list    Reason for Consult discharge planning    Preferred Language English               Functional Status     Row Name 02/22/23 1416       Functional Status    Usual Activity Tolerance moderate    Current Activity Tolerance fair       Functional Status, IADL    Medications independent    Meal Preparation independent    Housekeeping independent    Laundry independent    Shopping independent               Psychosocial    No documentation.                Abuse/Neglect    No documentation.                Legal    No documentation.                Substance Abuse    No documentation.                Patient Forms    No documentation.                   OLGA Smith

## 2023-02-22 NOTE — CONSULTS
Eric Simms   63 y.o.  male    LOS: 0 days   Patient Care Team:  Erlin Madrigal MD as PCP - General (Family Medicine)      Subjective     Chief Complaint: chest pain    History of Present Illness:  Mr Simms is a 64 yo male who is a very poor historian who states his PCP is Dr Madrigal with PMH significant for hypertension, diabetes mellitus, coronary artery disease with PCI, and dyslipidemia who presented with c/o chest pain. He has had multiple admissions in january to various hospitals for problems, he reports CVA in 2023 as well is BS was > 500 and his Hemoglobin A1c > 13%.  He presents this admission stating that while he was seated watching the game he began having midsternal chest discomfort described as a pinching sensation that radiated to his left arm, his left hand started drawing up and his left leg felt weak. It was associated with soa and he took 1 ntg and called EMS. Pain was relieved with 1 ntg        PMH per review of old records  DM  CAD with PCI pDiag 2018, h/o LAD stent 2016  Htn  Dsyslipidemia  CKD stage 3  esophageal dilation x2  H/o CVA--Ct head 2022 - CT head: Old infarct in the left parietal lobe. Mild chronic small vessel ischemic change. No other evidence of acute abnormality.      CARDIAC CATHETERIZATION 2018  Angiographic Findings  Cardiac Arteries and Lesion Findings  LMCA: The left main gives rise to the LAD and circumflex. Left main is a long  vessel which is normal.  LAD: LAD is a medium caliber vessel which gives rise to a medium size proximal  diagonal before tapering at the apex. The LAD has a mid vessel stent which is  patent with diffuse 40-50% in-stent restenosis. The distal LAD is diffusely  diseased with serial lesions of 50-60% stenosis. The diagonal branch has a  long proximal and mid 90% stenosis.  Lesion on 1st Dia% stenosis reduced to 0%. Pre procedure SAVANNA III flow  was noted. Good runoff was present. The lesion was diagnosed as High Risk  (C).  Culprit lesion.  LCx: The circumflex is a small caliber system which is essentially an AV  groove type vessel. The circumflex is normal.  RCA: The RCA is a large caliber dominant vessel which gives rise to a large  bifurcating posterolateral branch and a small caliber PDA branch. The RCA has  20-30% mid stenoses and 30-40% mid posterior lateral stenosis at its  Bifurcation.  Ventriculography Findings  Normal wall motion.  No mitral regurgitation.  Estimated ejection fraction 55-60%.  Diagnostic Summary/Impression  Successful Synergy RISHABH PCI to the proximal diagonal branch of the left  anterior descending x 1 .  Moderate diffuse in-stent restenosis of previously placed mid LAD stent with  diffuse moderate disease of the distal LAD.  Normal left ventricular wall motion and systolic function.  No mitral regurgitation or aortic stenosis.  Interventional Summary  Intervention to the proximal diagonal branch of the LAD .  Pre-dilation with 2.25 x 15 mm NC balloon with significant balloon sliding so  a balloon so a 2.5 x 15 mm Angiosculpt was used.  Stenting with 2.5 x 28 mm Synergy RISHABH .  Post dilated with a 2.75 x 15 mm NC balloon      Summary. Echo 7/23/18   The ejection fraction biplane was calculated at 61%   Left ventricular chamber size is mildly enlarged.   Mild left ventricular hypertrophy.   Overall left ventricular function is normal.   Normal diastolic filling pattern for age.   No significant valvular abnormalities.   Trace mitral regurgitation is present.   The aortic root appears normal.   Aortic root measures 3.6 cm    7/23/18  Abnormal pharmacological stress SPECT Myocardial Perfusion Imaging.   There is a medium sized mild-moderate severity reversible perfusion defect(s) of the   basal anterolateral, mid-anteroseptal wall consistent with ischemia.   Perfusion data correlates with quantitative imaging techniques.   Normal TID ratio. TID 1.14 .   Summary of LV Function   Gated SPECT analysis demonstrates a  post stress ejection fraction of 61 %.   Global LV systolic function is normal .       Past Medical History:   Diagnosis Date   • Coronary artery disease    • Diabetes mellitus (HCC)    • GERD (gastroesophageal reflux disease)    • Hyperlipidemia    • Hypertension    • Peripheral neuropathy    • Seizures (HCC)    • Stroke (HCC)      History reviewed. No pertinent surgical history.  Medications Prior to Admission   Medication Sig Dispense Refill Last Dose   • allopurinol (ZYLOPRIM) 100 MG tablet Take 100 mg by mouth Daily.   2/21/2023   • amLODIPine (NORVASC) 10 MG tablet Take 10 mg by mouth Daily.   2/21/2023   • atorvastatin (LIPITOR) 10 MG tablet Take 10 mg by mouth Daily.   2/21/2023   • chlorthalidone (HYGROTON) 25 MG tablet Take 25 mg by mouth Daily.   2/21/2023   • clopidogrel (PLAVIX) 75 MG tablet Take 75 mg by mouth Daily.   2/21/2023   • gabapentin (NEURONTIN) 400 MG capsule Take 800 mg by mouth 3 (Three) Times a Day As Needed.   2/21/2023   • HYDROcodone-acetaminophen (NORCO) 7.5-325 MG per tablet Take 1 tablet by mouth Every 8 (Eight) Hours As Needed for Moderate Pain.   2/21/2023   • insulin glargine (LANTUS, SEMGLEE) 100 UNIT/ML injection Inject 75 Units under the skin into the appropriate area as directed Every Night.   2/21/2023   • insulin glargine (LANTUS, SEMGLEE) 100 UNIT/ML injection Inject 15 Units under the skin into the appropriate area as directed 2 (Two) Times a Day.   2/21/2023   • Insulin Lispro (humaLOG) 100 UNIT/ML injection Inject 10 Units under the skin into the appropriate area as directed 3 (Three) Times a Day Before Meals.   2/21/2023   • isosorbide mononitrate (IMDUR) 120 MG 24 hr tablet Take 120 mg by mouth Daily.   2/21/2023   • levETIRAcetam (KEPPRA) 500 MG tablet Take 500 mg by mouth 2 (Two) Times a Day.   2/21/2023   • losartan (COZAAR) 50 MG tablet Take 100 mg by mouth Daily.   2/21/2023   • metoprolol tartrate (LOPRESSOR) 50 MG tablet Take 50 mg by mouth 2 (Two) Times a Day.    2/21/2023   • montelukast (SINGULAIR) 10 MG tablet Take 10 mg by mouth Every Night.   2/21/2023   • omeprazole (priLOSEC) 40 MG capsule Take 40 mg by mouth Daily.   2/21/2023   • pantoprazole (PROTONIX) 20 MG EC tablet Take 20 mg by mouth Daily.   2/21/2023   • traZODone (DESYREL) 100 MG tablet Take 100 mg by mouth Every Night.   2/21/2023       History reviewed. No pertinent family history.  Social History     Socioeconomic History   • Marital status: Single   Tobacco Use   • Smoking status: Never   • Smokeless tobacco: Never   Vaping Use   • Vaping Use: Never used   Substance and Sexual Activity   • Alcohol use: Not Currently   • Drug use: Not Currently     Comment: crack - last use 15 years ago   • Sexual activity: Defer     Objective       Review of Systems:   Constitutional: Negative for diaphoresis, fatigue, fever and unexpected weight change.   HENT: Negative.    Eyes: Negative.    Respiratory: Negative for cough, shortness of breath and wheezing.    Cardiovascular: Negative for chest pain, palpitations and leg swelling.   Gastrointestinal: Negative for abdominal pain, blood in stool, constipation, diarrhea, nausea and vomiting.   Endocrine: Negative.    Genitourinary: Negative for difficulty urinating, dysuria and frequency.   Musculoskeletal: Negative.    Skin: Negative.    Allergic/Immunologic: Negative for environmental allergies and food allergies.   Neurological: Negative.    Hematological: Negative.    Psychiatric/Behavioral: Negative.        Current Facility-Administered Medications:   •  acetaminophen (TYLENOL) tablet 650 mg, 650 mg, Oral, Q4H PRN **OR** acetaminophen (TYLENOL) 160 MG/5ML solution 650 mg, 650 mg, Oral, Q4H PRN **OR** acetaminophen (TYLENOL) suppository 650 mg, 650 mg, Rectal, Q4H PRN, Leonela Zelaya, BUNNY  •  calcium carbonate (TUMS) chewable tablet 500 mg (200 mg elemental), 2 tablet, Oral, BID PRN, Leonela Zelaya APRN  •  dextrose (D50W) (25 g/50 mL) IV injection 25  g, 25 g, Intravenous, Q15 Min PRN, Leonela Zelaya APRN  •  dextrose (GLUTOSE) oral gel 15 g, 15 g, Oral, Q15 Min PRN, Leonela Zelaya APRN  •  glucagon (GLUCAGEN) injection 1 mg, 1 mg, Subcutaneous, PRN, Leonela Zelaya APRN  •  insulin lispro (ADMELOG) injection 0-9 Units, 0-9 Units, Subcutaneous, 4x Daily With Meals & Nightly, Leonela Zelaya APRN  •  nitroglycerin (NITROSTAT) SL tablet 0.4 mg, 0.4 mg, Sublingual, Q5 Min PRN, Leonela Zelaya APRN  •  ondansetron (ZOFRAN) tablet 4 mg, 4 mg, Oral, Q6H PRN **OR** ondansetron (ZOFRAN) injection 4 mg, 4 mg, Intravenous, Q6H PRN, Leonela Zelaya APRN  •  [COMPLETED] Insert Peripheral IV, , , Once **AND** sodium chloride 0.9 % flush 10 mL, 10 mL, Intravenous, PRN, James Thomas III, PA  •  sodium chloride 0.9 % flush 10 mL, 10 mL, Intravenous, Q12H, Leonela Zelaya APRN  •  sodium chloride 0.9 % flush 10 mL, 10 mL, Intravenous, PRN, Leonela Zelaya APRN  •  sodium chloride 0.9 % infusion 40 mL, 40 mL, Intravenous, PRN, Leonela Zelaya APRN      Physical Exam:   Vital Sign Min/Max for last 24 hours  Temp  Min: 98.2 °F (36.8 °C)  Max: 98.2 °F (36.8 °C)   BP  Min: 142/78  Max: 172/90    Pulse  Min: 58  Max: 68     Wt Readings from Last 3 Encounters:   02/22/23 97.5 kg (214 lb 14.4 oz)       General Appearance:  Awake,  Alert, cooperative, cfhr debilitated male in no acute distress   Head:  Normocephalic, without obvious abnormality, atraumatic   Eyes:          Conjunctivae normal, anicteric, eom intact    Neck: No adenopathy, supple, trachea midline, no thyromegaly, no   carotid bruit, no JVD, no elevated cvp   Lungs:   Clear to auscultation,respirations regular, even and                  unlabored    Heart:  Regular rhythm and normal rate, normal S1 and S2,            No murmur, no gallop, no rub, no click    Chest Wall:  No abnormalities observed   Abdomen:   Normal bowel sounds, no masses, soft nontender,  nondistended                    Rectal:   Deferred   Extremities: No edema. Moves all extremities well, no cyanosis, no erythema   Pulses: Pulses palpable and equal bilaterally   Skin: No bleeding, bruising or rash   Neurologic: Speech clear and appropriate, no facial drooping     : voids      MONITOR: nsr    Results Review:     Sodium Sodium   Date Value Ref Range Status   02/22/2023 141 136 - 145 mmol/L Final   02/22/2023 135 (L) 136 - 145 mmol/L Final      Potassium Potassium   Date Value Ref Range Status   02/22/2023 4.6 3.5 - 5.2 mmol/L Final   02/22/2023 4.3 3.5 - 5.2 mmol/L Final      Chloride Chloride   Date Value Ref Range Status   02/22/2023 106 98 - 107 mmol/L Final   02/22/2023 101 98 - 107 mmol/L Final      Bicarbonate No results found for: PLASMABICARB   BUN BUN   Date Value Ref Range Status   02/22/2023 16 8 - 23 mg/dL Final   02/22/2023 18 8 - 23 mg/dL Final      Creatinine Creatinine   Date Value Ref Range Status   02/22/2023 1.30 (H) 0.76 - 1.27 mg/dL Final   02/22/2023 1.28 (H) 0.76 - 1.27 mg/dL Final      Calcium Calcium   Date Value Ref Range Status   02/22/2023 8.9 8.6 - 10.5 mg/dL Final   02/22/2023 9.1 8.6 - 10.5 mg/dL Final      Magnesium Magnesium   Date Value Ref Range Status   02/22/2023 2.0 1.6 - 2.4 mg/dL Final        Results from last 7 days   Lab Units 02/22/23  0648   WBC 10*3/mm3 7.45   HEMOGLOBIN g/dL 11.9*   HEMATOCRIT % 35.5*   PLATELETS 10*3/mm3 175     Lab Results   Lab Value Date/Time    TROPONINT 25 (H) 02/22/2023 0648    TROPONINT 23 (H) 02/22/2023 0223     No results found for: CHOL  Lab Results   Component Value Date    HDL 27 (L) 08/02/2018     Lab Results   Component Value Date     08/02/2018     Lab Results   Component Value Date    TRIG 302 (H) 08/02/2018     No components found for: CHOLHDL  No results found for: PTT  No components found for: PT/INR  Lab Results   Component Value Date    HGBA1C 12.30 (H) 02/22/2023      No results found for: TSH     Echo EF  Estimated  )No results found for: ECHOEFEST      Assessment/ Plan    Active Hospital Problems    Diagnosis  POA   • **Chest pain, unspecified type [R07.9]  Yes     Priority: Low       1. Chest pain  Hs TROP 25<- 23  CXR CARDIOMEGALY, POSSIBLE VASCULAR CONGESTION  Echo done 12/2022 unable to retrieve results      2. CAD with PCI pDiag 8/2/2018, h/o LAD stent 11/13/2016    3. DM  Poor control hga1c 12.3    4. Htn    5. Dsyslipidemia    PLAN  Atypical cp with trop neg X2 No MI, no ischemic changes on 12 lead ekg, check 2d echo, lexiscan stress in am(he has eaten breakfast)  He has follow up appt with dr del rio 5/10/2023     Kennedi Suarez, APRN  02/22/23  08:45 EST    Discussed with hunter  Time: 1 hour    Patient seen and examined  Heart S1 and S2 normal lungs clear    Patient scheduled for Lexiscan and an echocardiogram

## 2023-02-22 NOTE — H&P
Patient Name:  Eric Simms  YOB: 1960  MRN:  [id number]  Admit Date:  2/22/2023  Patient Care Team:  Erlin Madrigal MD as PCP - General (Family Medicine)      Subjective   History Present Illness     Chief Complaint   Patient presents with   • Chest Pain       Mr. Simms is a 63 y.o. former smoker with a history of diabetes mellitus type 2, hypertension, hyperlipidemia, GERD, peripheral neuropathy, seizures, CVA that presents to [facility] complaining of chest pain.    Plan for EGD with dilatation at [place] at end of January 2023 & instead went to ER for elevated blood sugar.      Patient states intermittent, alternating chest pain since end of December 2022 & previously evaluated at [facility]; however, limited historian regarding medical history.  Previously evaluated by Dr. Johnson.     Watching a basketball game last night on 2/21/2023 & developed intractable chest pain described as paralyzing; therefore, family notified EMS.    Took nitro x1 with slight relief.    Recommendation pending hospital course.  Details below.    History of Present Illness  Review of Systems   Constitutional: Negative for chills and fever.   HENT: Negative for congestion and rhinorrhea.    Respiratory: Negative for cough and shortness of breath.    Cardiovascular: Positive for chest pain. Negative for leg swelling.   Gastrointestinal: Negative for abdominal pain, constipation, diarrhea, nausea and vomiting.   Endocrine: Negative for polydipsia, polyphagia and polyuria.   Genitourinary: Negative for difficulty urinating and dysuria.   Musculoskeletal: Positive for gait problem (due to generalized weakness). Negative for myalgias.   Skin: Negative for rash and wound.   Neurological: Positive for dizziness. Negative for syncope and light-headedness.   Psychiatric/Behavioral: Negative for confusion and hallucinations.        Personal History     Past Medical History:   Diagnosis Date   •  Coronary artery disease    • Diabetes mellitus (HCC)    • GERD (gastroesophageal reflux disease)    • Hyperlipidemia    • Hypertension    • Peripheral neuropathy    • Seizures (HCC)    • Stroke (HCC)      History reviewed. No pertinent surgical history.  History reviewed. No pertinent family history.  Social History     Tobacco Use   • Smoking status: Never   • Smokeless tobacco: Never   Vaping Use   • Vaping Use: Never used   Substance Use Topics   • Alcohol use: Not Currently   • Drug use: Not Currently     Comment: crack - last use 15 years ago     No current facility-administered medications on file prior to encounter.     Current Outpatient Medications on File Prior to Encounter   Medication Sig Dispense Refill   • allopurinol (ZYLOPRIM) 100 MG tablet Take 100 mg by mouth Daily.     • amLODIPine (NORVASC) 10 MG tablet Take 10 mg by mouth Daily.     • atorvastatin (LIPITOR) 10 MG tablet Take 10 mg by mouth Daily.     • chlorthalidone (HYGROTON) 25 MG tablet Take 25 mg by mouth Daily.     • clopidogrel (PLAVIX) 75 MG tablet Take 75 mg by mouth Daily.     • gabapentin (NEURONTIN) 400 MG capsule Take 800 mg by mouth 3 (Three) Times a Day As Needed.     • HYDROcodone-acetaminophen (NORCO) 7.5-325 MG per tablet Take 1 tablet by mouth Every 8 (Eight) Hours As Needed for Moderate Pain.     • insulin glargine (LANTUS, SEMGLEE) 100 UNIT/ML injection Inject 75 Units under the skin into the appropriate area as directed Every Night.     • insulin glargine (LANTUS, SEMGLEE) 100 UNIT/ML injection Inject 15 Units under the skin into the appropriate area as directed 2 (Two) Times a Day.     • Insulin Lispro (humaLOG) 100 UNIT/ML injection Inject 10 Units under the skin into the appropriate area as directed 3 (Three) Times a Day Before Meals.     • isosorbide mononitrate (IMDUR) 120 MG 24 hr tablet Take 120 mg by mouth Daily.     • levETIRAcetam (KEPPRA) 500 MG tablet Take 500 mg by mouth 2 (Two) Times a Day.     • losartan  (COZAAR) 50 MG tablet Take 100 mg by mouth Daily.     • metFORMIN (GLUCOPHAGE) 500 MG tablet Take 500 mg by mouth 2 (Two) Times a Day With Meals.     • metoclopramide (REGLAN) 5 MG tablet Take 5 mg by mouth 4 (Four) Times a Day Before Meals & at Bedtime.     • metoprolol tartrate (LOPRESSOR) 50 MG tablet Take 50 mg by mouth 2 (Two) Times a Day.     • montelukast (SINGULAIR) 10 MG tablet Take 10 mg by mouth Every Night.     • omeprazole (priLOSEC) 40 MG capsule Take 40 mg by mouth Daily.     • pantoprazole (PROTONIX) 20 MG EC tablet Take 20 mg by mouth Daily.     • traZODone (DESYREL) 100 MG tablet Take 100 mg by mouth Every Night.       No Known Allergies    Objective    Objective     Vital Signs  Temp:  [98.2 °F (36.8 °C)] 98.2 °F (36.8 °C)  Heart Rate:  [58-68] 67  Resp:  [16] 16  BP: (142-172)/(74-92) 152/74  SpO2:  [92 %-99 %] 99 %  on   ;   Device (Oxygen Therapy): room air  Body mass index is 35.61 kg/m².    Physical Exam  Constitutional:       General: He is not in acute distress.     Appearance: He is obese. He is not toxic-appearing.   HENT:      Head: Normocephalic and atraumatic.   Eyes:      Extraocular Movements: Extraocular movements intact.      Conjunctiva/sclera: Conjunctivae normal.   Cardiovascular:      Rate and Rhythm: Normal rate.      Heart sounds: Normal heart sounds.   Pulmonary:      Effort: Pulmonary effort is normal.      Breath sounds: Normal breath sounds.   Abdominal:      General: Bowel sounds are normal.      Palpations: Abdomen is soft.   Musculoskeletal:      Cervical back: Normal range of motion and neck supple.      Right lower leg: Edema (non-pitting edema, BLE) present.      Left lower leg: Edema present.   Skin:     General: Skin is warm and dry.   Neurological:      Mental Status: He is alert and oriented to person, place, and time.      Cranial Nerves: No cranial nerve deficit.         Results Review:  I reviewed the patient's new clinical results.  I reviewed the patient's  new imaging results and agree with the interpretation.  I reviewed the patient's other test results and agree with the interpretation  I personally viewed and interpreted the patient's EKG/Telemetry data  Discussed with ED provider.    Lab Results (last 24 hours)     Procedure Component Value Units Date/Time    Magnesium [[id number]]  (Normal) Collected: 02/22/23 0115    Specimen: Blood from Arm, Right Updated: 02/22/23 0147     Magnesium 2.0 mg/dL     CBC & Differential [[id number]]  (Abnormal) Collected: 02/22/23 0223    Specimen: Blood from Hand, Right Updated: 02/22/23 0251    Narrative:      The following orders were created for panel order CBC & Differential.  Procedure                               Abnormality         Status                     ---------                               -----------         ------                     CBC Auto Differential[[id number]]        Abnormal            Final result                 Please view results for these tests on the individual orders.    Comprehensive Metabolic Panel [[id number]]  (Abnormal) Collected: 02/22/23 0223    Specimen: Blood from Hand, Right Updated: 02/22/23 0303     Glucose 190 mg/dL      BUN 18 mg/dL      Creatinine 1.28 mg/dL      Sodium 135 mmol/L      Potassium 4.3 mmol/L      Chloride 101 mmol/L      CO2 25.9 mmol/L      Calcium 9.1 mg/dL      Total Protein 7.0 g/dL      Albumin 4.3 g/dL      ALT (SGPT) 18 U/L      AST (SGOT) 12 U/L      Alkaline Phosphatase 69 U/L      Total Bilirubin 0.3 mg/dL      Globulin 2.7 gm/dL      A/G Ratio 1.6 g/dL      BUN/Creatinine Ratio 14.1     Anion Gap 8.1 mmol/L      eGFR 62.9 mL/min/1.73     Narrative:      GFR Normal >60  Chronic Kidney Disease <60  Kidney Failure <15      High Sensitivity Troponin T [[id number]]  (Abnormal) Collected: 02/22/23 0223    Specimen: Blood from Hand, Right Updated: 02/22/23 0303     HS Troponin T 23 ng/L     Narrative:      High Sensitive Troponin T Reference Range:  <10.0 ng/L- Negative  Female for AMI  <15.0 ng/L- Negative Male for AMI  >=10 - Abnormal Female indicating possible myocardial injury.  >=15 - Abnormal Male indicating possible myocardial injury.   Clinicians would have to utilize clinical acumen, EKG, Troponin, and serial changes to determine if it is an Acute Myocardial Infarction or myocardial injury due to an underlying chronic condition.         CBC Auto Differential [[id number]]  (Abnormal) Collected: 02/22/23 0223    Specimen: Blood from Hand, Right Updated: 02/22/23 0251     WBC 7.69 10*3/mm3      RBC 3.72 10*6/mm3      Hemoglobin 11.6 g/dL      Hematocrit 34.8 %      MCV 93.5 fL      MCH 31.2 pg      MCHC 33.3 g/dL      RDW 13.7 %      RDW-SD 46.7 fl      MPV 11.5 fL      Platelets 158 10*3/mm3      Neutrophil % 60.9 %      Lymphocyte % 23.8 %      Monocyte % 9.4 %      Eosinophil % 4.7 %      Basophil % 0.7 %      Neutrophils, Absolute 4.69 10*3/mm3      Lymphocytes, Absolute 1.83 10*3/mm3      Monocytes, Absolute 0.72 10*3/mm3      Eosinophils, Absolute 0.36 10*3/mm3      Basophils, Absolute 0.05 10*3/mm3     POC Glucose Once [[id number]]  (Abnormal) Collected: 02/22/23 0639    Specimen: Blood Updated: 02/22/23 0640     Glucose 134 mg/dL      Comment: Meter: [id number] : [id number] Morris CLEMENTS       High Sensitivity Troponin T 2Hr [[id number]]  (Abnormal) Collected: 02/22/23 0648    Specimen: Blood Updated: 02/22/23 0726     HS Troponin T 25 ng/L      Troponin T Delta 2 ng/L     Narrative:      High Sensitive Troponin T Reference Range:  <10.0 ng/L- Negative Female for AMI  <15.0 ng/L- Negative Male for AMI  >=10 - Abnormal Female indicating possible myocardial injury.  >=15 - Abnormal Male indicating possible myocardial injury.   Clinicians would have to utilize clinical acumen, EKG, Troponin, and serial changes to determine if it is an Acute Myocardial Infarction or myocardial injury due to an underlying chronic condition.         Basic Metabolic Panel [[id number]]   (Abnormal) Collected: 02/22/23 0648    Specimen: Blood Updated: 02/22/23 0726     Glucose 146 mg/dL      BUN 16 mg/dL      Creatinine 1.30 mg/dL      Sodium 141 mmol/L      Potassium 4.6 mmol/L      Chloride 106 mmol/L      CO2 25.4 mmol/L      Calcium 8.9 mg/dL      BUN/Creatinine Ratio 12.3     Anion Gap 9.6 mmol/L      eGFR 61.7 mL/min/1.73     Narrative:      GFR Normal >60  Chronic Kidney Disease <60  Kidney Failure <15      CBC (No Diff) [[id number]]  (Abnormal) Collected: 02/22/23 0648    Specimen: Blood Updated: 02/22/23 0701     WBC 7.45 10*3/mm3      RBC 3.82 10*6/mm3      Hemoglobin 11.9 g/dL      Hematocrit 35.5 %      MCV 92.9 fL      MCH 31.2 pg      MCHC 33.5 g/dL      RDW 13.8 %      RDW-SD 46.4 fl      MPV 11.3 fL      Platelets 175 10*3/mm3     Hemoglobin A1c [[id number]]  (Abnormal) Collected: 02/22/23 0648    Specimen: Blood Updated: 02/22/23 0711     Hemoglobin A1C 12.30 %     Narrative:      Hemoglobin A1C Ranges:    Increased Risk for Diabetes  5.7% to 6.4%  Diabetes                     >= 6.5%  Diabetic Goal                < 7.0%    POC Glucose Once [[id number]]  (Abnormal) Collected: 02/22/23 1059    Specimen: Blood Updated: 02/22/23 1100     Glucose 272 mg/dL      Comment: Meter: [id number] : [id number] Franklyn Bruce PCA             Imaging Results (Last 24 Hours)     Procedure Component Value Units Date/Time    XR Chest 1 View [[id number]] Collected: 02/22/23 0129     Updated: 02/22/23 0133    Narrative:      SINGLE VIEW OF THE CHEST     HISTORY: Chest pain     COMPARISON: None available.     FINDINGS:  Cardiomegaly is present. There may be some vascular congestion. There is  calcification of the aorta. No pneumothorax, pleural effusion, or acute  infiltrate is seen.       Impression:      Cardiomegaly. Possible vascular congestion.     This report was finalized on 2/22/2023 1:30 AM by Dr. Kamala Carrasco M.D.                 ECG 12 Lead Chest Pain   Final Result   HEART RATE= 66  bpm    RR Interval= 909  ms   IN Interval= 184  ms   P Horizontal Axis= 18  deg   P Front Axis= 48  deg   QRSD Interval= 114  ms   QT Interval= 440  ms   QRS Axis= 72  deg   T Wave Axis= 29  deg   - OTHERWISE NORMAL ECG -   Sinus rhythm   Borderline intraventricular conduction delay   No Prior Tracing for Comparison   Electronically Signed By: Rc Gama ([facility]) 22-Feb-2023 11:56:41   Date and Time of Study: 2023-02-22 00:44:22           Assessment/Plan     Active Hospital Problems    Diagnosis  POA   • **Chest pain, unspecified type [R07.9]  Yes   • Type 2 diabetes mellitus with hyperglycemia (HCC) [E11.65]  Yes   • HTN (hypertension) [I10]  Yes   • Obesity (BMI 30-39.9) [E66.9]  Yes   • Anemia, chronic disease [D63.8]  Yes   • CKD (chronic kidney disease) stage 2, GFR 60-89 ml/min [N18.2]  Yes   • Seizure disorder (HCC) [G40.909]  Yes      Resolved Hospital Problems   No resolved problems to display.       Mr. Simms is a 63 y.o. former smoker with a history of diabetes mellitus type 2, hypertension, hyperlipidemia, GERD, peripheral neuropathy, seizures, CVA that presents to [facility] complaining of chest pain.      Chest pain, unspecified type: No ischemic changes on EKG.  TTE ordered.  Cardiology following plan Lexiscan stress in AM.  Plavix, statin, Imdur continued for now.      Type 2 diabetes mellitus with hyperglycemia (HCC): A1c 12.3 indicating poorly controlled diabetes in OP setting.  Provide Lantus 30 units subcu twice daily (reports home dose Lantus 75 units nightly) for now, scheduled Admelog 3 units subcu 3 times daily with meals, moderate dose correctional sliding scale 4 times daily.  Tolerating diet at present.  Monitor glucose trends.      HTN (hypertension): BP acceptable acutely.  Continue Norvasc 10 mg p.o. daily, chlorthalidone 25 mg p.o. daily, losartan 100 mg p.o. daily, metoprolol tartrate 50 milligram p.o. twice daily per home dose regimen.  Monitor blood pressure for  changes.      Obesity (BMI 30-39.9): BMI 35.  Complicating patient problems.      Anemia, chronic disease: Serum hemoglobin stable compared to priors.  Ordering iron profile, ferritin, folate, vitamin B12.      CKD (chronic kidney disease) stage 2, GFR 60-89 ml/min: Serum creatinine appears at baseline.  Avoid nephrotoxins.  Monitor labs.      Seizure disorder (HCC): Reports last seizure activity approximately 8 years ago.  Keppra continued per home dose regimen.  Seizure precautions.      · I discussed the patient's findings and my recommendations with patient, RN, & Dr. Cormier.    *Discussed with Dr. Melendrez plans to take over care on 2/23/2023.    VTE Prophylaxis - SCDs.  Code Status - Full code.       BUNNY Sebastian  [facility]  02/22/23  12:50 EST

## 2023-02-22 NOTE — ED PROVIDER NOTES
EMERGENCY DEPARTMENT ENCOUNTER    Room Number:  124/1  Date seen:  2/22/2023  PCP: Erlin Madrigal MD      HPI:  Chief Complaint: Chest pain    A complete HPI/ROS/PMH/PSH/SH/FH are unobtainable due to: Patient is a very poor historian    Context: Eric Simms is a 63 y.o. male who presents to the ED c/o chest pain that began approximately an hour and a half PTA.  Patient describes his pain as sharp and states it is on the left side of the chest.  It does not radiate.  Is not associated with nausea, vomiting, diaphoresis.  Patient does states she felt slightly short of breath during his bout of chest pain.  The pain has been constant since onset.  It did improve with sublingual nitro according to the patient and EMS.  At present he describes it as moderate.  He tells me the pain felt similar to previous heart attacks only more mild in nature.  He states his last MI was approximately 2 years ago.  He thinks he may have been seen at [facility].  He is unaware of who his cardiologist is.  He is unsure if he has stents.    He states he is uncontrolled diabetic.  Patient also informs me he recently had several strokes over the past few months.  He believes he was seen at [place] for at least one of the strokes.        PAST MEDICAL HISTORY  Active Ambulatory Problems     Diagnosis Date Noted   • No Active Ambulatory Problems     Resolved Ambulatory Problems     Diagnosis Date Noted   • No Resolved Ambulatory Problems     Past Medical History:   Diagnosis Date   • Coronary artery disease    • Diabetes mellitus (HCC)    • GERD (gastroesophageal reflux disease)    • Hyperlipidemia    • Hypertension    • Peripheral neuropathy    • Seizures (HCC)    • Stroke (HCC)          PAST SURGICAL HISTORY  History reviewed. No pertinent surgical history.      FAMILY HISTORY  History reviewed. No pertinent family history.      SOCIAL HISTORY  Social History     Socioeconomic History   • Marital status: Single    Tobacco Use   • Smoking status: Never   • Smokeless tobacco: Never   Substance and Sexual Activity   • Alcohol use: Not Currently   • Drug use: Not Currently     Comment: crack - last use 15 years ago   • Sexual activity: Defer         ALLERGIES  Patient has no known allergies.        REVIEW OF SYSTEMS  Review of Systems     All systems reviewed and negative except for those discussed in HPI.       PHYSICAL EXAM  ED Triage Vitals [02/22/23 0030]   Temp Heart Rate Resp BP SpO2   98.2 °F (36.8 °C) 68 16 153/92 98 %      Temp src Heart Rate Source Patient Position BP Location FiO2 (%)   Oral Monitor Sitting Right arm --       Physical Exam      GENERAL: Very pleasant, nontoxic  HENT: nares patent  EYES: no scleral icterus  CV: regular rhythm, normal rate, normal S1-S2, no obvious unilateral leg swelling or pedal edema  RESPIRATORY: normal effort, lungs CTA  ABDOMEN: soft, nontender  MUSCULOSKELETAL: no deformity  NEURO: alert, moves all extremities, follows commands  PSYCH:  calm, cooperative  SKIN: warm, dry    Vital signs and nursing notes reviewed.          LAB RESULTS  Recent Results (from the past 24 hour(s))   ECG 12 Lead Chest Pain    Collection Time: 02/22/23 12:44 AM   Result Value Ref Range    QT Interval 440 ms   Magnesium    Collection Time: 02/22/23  1:15 AM    Specimen: Arm, Right; Blood   Result Value Ref Range    Magnesium 2.0 1.6 - 2.4 mg/dL   Comprehensive Metabolic Panel    Collection Time: 02/22/23  2:23 AM    Specimen: Hand, Right; Blood   Result Value Ref Range    Glucose 190 (H) 65 - 99 mg/dL    BUN 18 8 - 23 mg/dL    Creatinine 1.28 (H) 0.76 - 1.27 mg/dL    Sodium 135 (L) 136 - 145 mmol/L    Potassium 4.3 3.5 - 5.2 mmol/L    Chloride 101 98 - 107 mmol/L    CO2 25.9 22.0 - 29.0 mmol/L    Calcium 9.1 8.6 - 10.5 mg/dL    Total Protein 7.0 6.0 - 8.5 g/dL    Albumin 4.3 3.5 - 5.2 g/dL    ALT (SGPT) 18 1 - 41 U/L    AST (SGOT) 12 1 - 40 U/L    Alkaline Phosphatase 69 39 - 117 U/L    Total Bilirubin  0.3 0.0 - 1.2 mg/dL    Globulin 2.7 gm/dL    A/G Ratio 1.6 g/dL    BUN/Creatinine Ratio 14.1 7.0 - 25.0    Anion Gap 8.1 5.0 - 15.0 mmol/L    eGFR 62.9 >60.0 mL/min/1.73   High Sensitivity Troponin T    Collection Time: 02/22/23  2:23 AM    Specimen: Hand, Right; Blood   Result Value Ref Range    HS Troponin T 23 (H) <15 ng/L   CBC Auto Differential    Collection Time: 02/22/23  2:23 AM    Specimen: Hand, Right; Blood   Result Value Ref Range    WBC 7.69 3.40 - 10.80 10*3/mm3    RBC 3.72 (L) 4.14 - 5.80 10*6/mm3    Hemoglobin 11.6 (L) 13.0 - 17.7 g/dL    Hematocrit 34.8 (L) 37.5 - 51.0 %    MCV 93.5 79.0 - 97.0 fL    MCH 31.2 26.6 - 33.0 pg    MCHC 33.3 31.5 - 35.7 g/dL    RDW 13.7 12.3 - 15.4 %    RDW-SD 46.7 37.0 - 54.0 fl    MPV 11.5 6.0 - 12.0 fL    Platelets 158 140 - 450 10*3/mm3    Neutrophil % 60.9 42.7 - 76.0 %    Lymphocyte % 23.8 19.6 - 45.3 %    Monocyte % 9.4 5.0 - 12.0 %    Eosinophil % 4.7 0.3 - 6.2 %    Basophil % 0.7 0.0 - 1.5 %    Neutrophils, Absolute 4.69 1.70 - 7.00 10*3/mm3    Lymphocytes, Absolute 1.83 0.70 - 3.10 10*3/mm3    Monocytes, Absolute 0.72 0.10 - 0.90 10*3/mm3    Eosinophils, Absolute 0.36 0.00 - 0.40 10*3/mm3    Basophils, Absolute 0.05 0.00 - 0.20 10*3/mm3   POC Glucose Once    Collection Time: 02/22/23  6:39 AM    Specimen: Blood   Result Value Ref Range    Glucose 134 (H) 70 - 130 mg/dL   CBC (No Diff)    Collection Time: 02/22/23  6:48 AM    Specimen: Blood   Result Value Ref Range    WBC 7.45 3.40 - 10.80 10*3/mm3    RBC 3.82 (L) 4.14 - 5.80 10*6/mm3    Hemoglobin 11.9 (L) 13.0 - 17.7 g/dL    Hematocrit 35.5 (L) 37.5 - 51.0 %    MCV 92.9 79.0 - 97.0 fL    MCH 31.2 26.6 - 33.0 pg    MCHC 33.5 31.5 - 35.7 g/dL    RDW 13.8 12.3 - 15.4 %    RDW-SD 46.4 37.0 - 54.0 fl    MPV 11.3 6.0 - 12.0 fL    Platelets 175 140 - 450 10*3/mm3       Ordered the above labs and reviewed the results.        RADIOLOGY  XR Chest 1 View    Result Date: 2/22/2023  SINGLE VIEW OF THE CHEST  HISTORY:  Chest pain  COMPARISON: None available.  FINDINGS: Cardiomegaly is present. There may be some vascular congestion. There is calcification of the aorta. No pneumothorax, pleural effusion, or acute infiltrate is seen.      Cardiomegaly. Possible vascular congestion.  This report was finalized on 2/22/2023 1:30 AM by Dr. Kamala Carrasco M.D.        Ordered the above noted radiological studies. Reviewed by me in PACS.          PROCEDURES  Procedures          MEDICATIONS GIVEN IN ER  Medications   sodium chloride 0.9 % flush 10 mL (has no administration in time range)   sodium chloride 0.9 % flush 10 mL (has no administration in time range)   sodium chloride 0.9 % flush 10 mL (has no administration in time range)   sodium chloride 0.9 % infusion 40 mL (has no administration in time range)   nitroglycerin (NITROSTAT) SL tablet 0.4 mg (has no administration in time range)   acetaminophen (TYLENOL) tablet 650 mg (has no administration in time range)     Or   acetaminophen (TYLENOL) 160 MG/5ML solution 650 mg (has no administration in time range)     Or   acetaminophen (TYLENOL) suppository 650 mg (has no administration in time range)   ondansetron (ZOFRAN) tablet 4 mg (has no administration in time range)     Or   ondansetron (ZOFRAN) injection 4 mg (has no administration in time range)   calcium carbonate (TUMS) chewable tablet 500 mg (200 mg elemental) (has no administration in time range)   dextrose (GLUTOSE) oral gel 15 g (has no administration in time range)   dextrose (D50W) (25 g/50 mL) IV injection 25 g (has no administration in time range)   glucagon (GLUCAGEN) injection 1 mg (has no administration in time range)   insulin lispro (ADMELOG) injection 0-9 Units (has no administration in time range)   morphine injection 4 mg (4 mg Intravenous Given 2/22/23 0220)   ondansetron (ZOFRAN) injection 4 mg (4 mg Intravenous Given 2/22/23 0220)         MEDICAL DECISION MAKING, PROGRESS, and CONSULTS    Discussion below  represents my analysis of pertinent findings related to patient's condition, differential diagnosis, treatment plan and final disposition.      Orders placed during this visit:  Orders Placed This Encounter   Procedures   • XR Chest 1 View   • Comprehensive Metabolic Panel   • High Sensitivity Troponin T   • Magnesium   • CBC Auto Differential   • High Sensitivity Troponin T 2Hr   • Potassium   • Magnesium   • High Sensitivity Troponin T   • Blood Gas, Arterial -   • Basic Metabolic Panel   • CBC (No Diff)   • Hemoglobin A1c   • Diet: Cardiac Diets; Healthy Heart (2-3 Na+); Texture: Regular Texture (IDDSI 7); Fluid Consistency: Thin (IDDSI 0)   • Pulse Oximetry, Continuous   • Vital Signs   • Intake & Output   • Weigh Patient   • Oral Care   • Place Sequential Compression Device   • Maintain Sequential Compression Device   • Telemetry - Maintain IV Access   • Continuous Cardiac Monitoring   • Telemetry - Pulse Oximetry   • May Be Off Telemetry for Tests   • Do NOT Hold Basal or Correction Scale Insulin When Patient is NPO, Hold Scheduled Mealtime (Bolus) Insulin if NPO   • Code Status and Medical Interventions:   • LHA (on-call MD unless specified) Details   • Inpatient Cardiology Consult   • Oxygen Therapy- Nasal Cannula; Titrate for SPO2: 90% - 95%   • Oxygen Therapy- Nasal Cannula; Titrate for SPO2: 90% - 95%   • POC Glucose TID AC   • POC Glucose Once   • ECG 12 Lead Chest Pain   • ECG 12 Lead Chest Pain   • Insert Peripheral IV   • Insert Peripheral IV   • Initiate Observation Status   • CBC & Differential         Additional sources:  - Discussed/obtained information from independent historians: EMS augments history  Additional information was obtained to confirm the patient's history.    - External (non-ED) record review: There are no prior records in [facility] to review.    - Chronic or social conditions impacting care: Patient is an incredibly poor historian and lives at home alone.   He states he does not have transportation to follow-up with any of his doctors.  He is unable to tell me who his cardiologist is at this time.        Differential diagnosis:    ACS, PTX, PE, PNA, aortic dissection, chest wall pain, GI mediated chest pain, pericarditis, cardiac tamponade, pleurisy.  Chest wall pain.        Independent interpretation of labs, radiology studies, and discussions with consultants:  ED Course as of 02/22/23 0704   Wed Feb 22, 2023   0121 XR Chest 1 View  My independent interpretation of the chest x-ray is cardiomegaly and questionable vascular congestion.  Radiologist confirms cardiomegaly and vascular congestion. [RC]   0159 Magnesium: 2.0 [RC]   0414 WBC: 7.69 [RC]   0414 RBC(!): 3.72 [RC]   0414 Hemoglobin(!): 11.6 [RC]   0414 Hematocrit(!): 34.8 [RC]   0414 Platelets: 158 [RC]   0414 Glucose(!): 190 [RC]   0414 BUN: 18 [RC]   0414 Creatinine(!): 1.28 [RC]   0414 Sodium(!): 135 [RC]   0414 Potassium: 4.3 [RC]   0414 CO2: 25.9 [RC]   0414 Anion Gap: 8.1 [RC]   0414 Magnesium: 2.0 [RC]   0414 EKG          EKG time: 12:44 AM  Rhythm/Rate: 64/sinus  P waves and NJ: Normal NJ interval  QRS, axis: Normal axis  ST and T waves: No acute ST changes noted.  Nonspecific T wave changes inferior leads.    Interpreted Contemporaneously by me, independently viewed  -No prior EKG to compare [RC]   0421 HEART SCORE    History Moderately suspicious (1)  ECG Nonspecific repol disturbance (1)  Age 46-65 (1)  Risk factors > or = to 3 RF for atherosclerotic dx (2)  Troponin < or = Normal limit (0)    This patient's HEART score is 5    HEART Score Key:  Scores 0-3: 0.9-1.7% risk of adverse cardiac event. In the HEART Score study, these patients were discharged (0.99% in the retrospective study, 1.7% in the prospective study)  Scores 4-6: 12-16.6% risk of adverse cardiac event. In the HEART Score study, these patients were admitted to the hospital. (11.6% retrospective, 16.6% prospective)  Scores ?7: 50-65%  risk of adverse cardiac event. In the HEART Score study, these patients were candidates for early invasive measures. (65.2% retrospective, 50.1% prospective)    [RC]   0424 Patient's heart score is a 5.  Although his chest pain is somewhat atypical and sharp presentation.  Patient does have a past medical history of heart attacks and states his pain felt similar to previous heart attack tonight but not as severe.  He is a very poor historian, poorly controlled diabetic, and has no transportation and a hard time following up with any of his physicians.  Taking all the above into account, it is felt to be better served by admission to the hospital at this time for further evaluation.  We will place a call to the hospitalist [RC]   0510 Discussed the patient's case with BUNNY Neville with [facility].  To admit to Dr. Bruce's care in a telemetry bed. [RC]   0519 Discovered patient should have been admitted to Dr. Atilio Melendrez.  Called and discussed with Leonela Zelaya.  To leave as is for now and they will transfer later to Dr. Melendrez's care.  I updated Dr. Melendrez on the patient's case and he is aware and will manage further. [RC]      ED Course User Index  [RC] James Thomas III, PA                  PPE: The patient wore a mask throughout the entire encounter. I wore a well-fitting mask.    DIAGNOSIS  Final diagnoses:   Chest pain, unspecified type   Hyperglycemia   Poorly controlled diabetes mellitus (HCC)         DISPOSITION  ADMISSION    Discussed treatment plan and reason for admission with pt/family and admitting physician.  Pt/family voiced understanding of the plan for admission for further testing/treatment as needed.         Latest Documented Vital Signs:  As of 07:04 EST  BP- 172/90 HR- 67 Temp- 98.2 °F (36.8 °C) (Oral) O2 sat- 99%      --    Please note that portions of this were completed with a voice recognition program.       Note Disclaimer: At [facility], we believe that sharing information  builds trust and better relationships. You are receiving this note because you are receiving care at [facility] or recently visited. It is possible you will see health information before a provider has talked with you about it. This kind of information can be easy to misunderstand. To help you fully understand what it means for your health, we urge you to discuss this note with your provider.       James Thomas III, PA  02/22/23 0704

## 2023-02-22 NOTE — ED PROVIDER NOTES
MD ATTESTATION NOTE    The TEJAL and I have discussed this patient's history, physical exam, and treatment plan.  I have reviewed the documentation and personally had a face to face interaction with the patient. I affirm the documentation and agree with the treatment and plan.  The attached note describes my personal findings.    I provided a substantive portion of the care of this patient. I personally performed the physical exam, in its entirety.    Independent Historians: Patient, EMS    A complete HPI/ROS/PMH/PSH/SH/FH are unobtainable due to: Nothing    Chronic or social conditions impacting patient care (social determinants of health): None    Eric Simms is a 63 y.o. male who presents to the ED c/o left-sided chest pain that started about an hour and a half prior to arrival.  He states it is sharp.  He states he has had similar episodes in the past.  It does not radiate.  Nothing makes it worse or better.  He reports a history of heart attacks.  He was given nitroglycerin by EMS.      Review of prior external notes (non-ED): He had an endocrinology note dated 2/7/2023 for follow-up of type 2 diabetes.  They provided counseling.    Review of prior external test results outside of this encounter: CBC dated 1/26/2023 was normal.  CMP the same date showed elevated glucose of 468.  Creatinine was 1.5    On exam:  GENERAL: Awake, alert, no acute distress  SKIN: Warm, dry  HENT: Normocephalic, atraumatic  EYES: no scleral icterus  CV: regular rhythm, regular rate  RESPIRATORY: normal effort, lungs clear  ABDOMEN: soft, nontender, nondistended  MUSCULOSKELETAL: no deformity, no calf tenderness or swelling  NEURO: alert, moves all extremities, follows commands    Labs  Recent Results (from the past 24 hour(s))   POC Glucose Once    Collection Time: 02/22/23  6:39 AM    Specimen: Blood   Result Value Ref Range    Glucose 134 (H) 70 - 130 mg/dL   High Sensitivity Troponin T 2Hr    Collection Time: 02/22/23  6:48 AM     Specimen: Blood   Result Value Ref Range    HS Troponin T 25 (H) <15 ng/L    Troponin T Delta 2 >=-4 - <+4 ng/L   Basic Metabolic Panel    Collection Time: 02/22/23  6:48 AM    Specimen: Blood   Result Value Ref Range    Glucose 146 (H) 65 - 99 mg/dL    BUN 16 8 - 23 mg/dL    Creatinine 1.30 (H) 0.76 - 1.27 mg/dL    Sodium 141 136 - 145 mmol/L    Potassium 4.6 3.5 - 5.2 mmol/L    Chloride 106 98 - 107 mmol/L    CO2 25.4 22.0 - 29.0 mmol/L    Calcium 8.9 8.6 - 10.5 mg/dL    BUN/Creatinine Ratio 12.3 7.0 - 25.0    Anion Gap 9.6 5.0 - 15.0 mmol/L    eGFR 61.7 >60.0 mL/min/1.73   CBC (No Diff)    Collection Time: 02/22/23  6:48 AM    Specimen: Blood   Result Value Ref Range    WBC 7.45 3.40 - 10.80 10*3/mm3    RBC 3.82 (L) 4.14 - 5.80 10*6/mm3    Hemoglobin 11.9 (L) 13.0 - 17.7 g/dL    Hematocrit 35.5 (L) 37.5 - 51.0 %    MCV 92.9 79.0 - 97.0 fL    MCH 31.2 26.6 - 33.0 pg    MCHC 33.5 31.5 - 35.7 g/dL    RDW 13.8 12.3 - 15.4 %    RDW-SD 46.4 37.0 - 54.0 fl    MPV 11.3 6.0 - 12.0 fL    Platelets 175 140 - 450 10*3/mm3   Hemoglobin A1c    Collection Time: 02/22/23  6:48 AM    Specimen: Blood   Result Value Ref Range    Hemoglobin A1C 12.30 (H) 4.80 - 5.60 %   POC Glucose Once    Collection Time: 02/22/23 10:59 AM    Specimen: Blood   Result Value Ref Range    Glucose 272 (H) 70 - 130 mg/dL   Adult Transthoracic Echo Complete w/ Color, Spectral and Contrast if Necessary Per Protocol    Collection Time: 02/22/23 11:42 AM   Result Value Ref Range    Target HR (85%) 133 bpm    Max. Pred. HR (100%) 157 bpm    BH CV VAS BP RIGHT /74 mmHg    EF(MOD-bp) 56.3 %    LVIDd 5.0 cm    LVIDs 3.3 cm    IVSd 1.06 cm    LVPWd 0.96 cm    FS 33.5 %    IVS/LVPW 1.09 cm    ESV(cubed) 36.8 ml    EDV(cubed) 125.0 ml    LVOT area 4.1 cm2    LV mass(C)d 184.5 grams    LVOT diam 2.28 cm    EDV(MOD-sp2) 108.0 ml    EDV(MOD-sp4) 135.0 ml    ESV(MOD-sp2) 49.0 ml    ESV(MOD-sp4) 57.0 ml    SV(MOD-sp2) 59.0 ml    SV(MOD-sp4) 78.0 ml     EF(MOD-sp2) 54.6 %    EF(MOD-sp4) 57.8 %    MV E max jose guadalupe 95.1 cm/sec    MV A max jose guadalupe 89.1 cm/sec    MV dec time 145 msec    MV E/A 1.07     Pulm A Revs Dur 0.13 sec    MV A dur 0.14 sec    LA ESV Index (BP) 46.0 ml/m2    Med Peak E' Jose Guadalupe 8.4 cm/sec    Lat Peak E' Jose Guadalupe 9.6 cm/sec    Avg E/e' ratio 10.57     SV(LVOT) 98.0 ml    SV(RVOT) 40.4 ml    Qp/Qs 0.41     RV Base 3.7 cm    RV Mid 3.4 cm    RV Length 7.0 cm    RV S' 9.4 cm/sec    Pulm Sys Jose Guadalupe 73.1 cm/sec    Pulm Bauman Jose Guadalupe 49.6 cm/sec    Pulm S/D 1.47     Pulm A Revs Jose Guadalupe 33.7 cm/sec    LV V1 max 102.4 cm/sec    LV V1 max PG 4.2 mmHg    LV V1 mean PG 2.00 mmHg    LV V1 VTI 24.1 cm    Ao pk jose guadalupe 130.3 cm/sec    Ao max PG 6.8 mmHg    Ao mean PG 3.4 mmHg    Ao V2 VTI 28.9 cm    PANTERA(I,D) 3.4 cm2    MV max PG 5.4 mmHg    MV mean PG 2.22 mmHg    MV V2 VTI 32.4 cm    MV P1/2t 81.2 msec    MVA(P1/2t) 2.7 cm2    MVA(VTI) 3.0 cm2    MV dec slope 437.3 cm/sec2    TR max jose guadalupe 222.0 cm/sec    TR max PG 19.7 mmHg    RVSP(TR) 22.7 mmHg    RAP systole 3.0 mmHg    RVOT diam 1.83 cm    RV V1 max PG 1.70 mmHg    RV V1 max 65.1 cm/sec    RV V1 VTI 15.4 cm    PA V2 max 107.2 cm/sec    PA acc time 0.12 sec    PA pr(Accel) 25.5 mmHg    Ao root diam 3.6 cm    ACS 2.35 cm    STJ 3.2 cm    TAPSE (>1.6) 2.00 cm    Dimensionless Index 0.80 (DI)    Ascending aorta 3.4 cm   POC Glucose Once    Collection Time: 02/22/23  4:27 PM    Specimen: Blood   Result Value Ref Range    Glucose 229 (H) 70 - 130 mg/dL   POC Glucose Once    Collection Time: 02/22/23  8:24 PM    Specimen: Blood   Result Value Ref Range    Glucose 297 (H) 70 - 130 mg/dL       Radiology  No Radiology Exams Resulted Within Past 24 Hours    Medical Decision Making:  ED Course as of 02/23/23 0303   Wed Feb 22, 2023   0121 XR Chest 1 View  My independent interpretation of the chest x-ray is cardiomegaly and questionable vascular congestion.  Radiologist confirms cardiomegaly and vascular congestion. [RC]   0159 Magnesium: 2.0 [RC]    0414 WBC: 7.69 [RC]   0414 RBC(!): 3.72 [RC]   0414 Hemoglobin(!): 11.6 [RC]   0414 Hematocrit(!): 34.8 [RC]   0414 Platelets: 158 [RC]   0414 Glucose(!): 190 [RC]   0414 BUN: 18 [RC]   0414 Creatinine(!): 1.28 [RC]   0414 Sodium(!): 135 [RC]   0414 Potassium: 4.3 [RC]   0414 CO2: 25.9 [RC]   0414 Anion Gap: 8.1 [RC]   0414 Magnesium: 2.0 [RC]   0414 EKG          EKG time: 12:44 AM  Rhythm/Rate: 64/sinus  P waves and OR: Normal OR interval  QRS, axis: Normal axis  ST and T waves: No acute ST changes noted.  Nonspecific T wave changes inferior leads.    Interpreted Contemporaneously by me, independently viewed  -No prior EKG to compare [RC]   0421 HEART SCORE    History Moderately suspicious (1)  ECG Nonspecific repol disturbance (1)  Age 46-65 (1)  Risk factors > or = to 3 RF for atherosclerotic dx (2)  Troponin < or = Normal limit (0)    This patient's HEART score is 5    HEART Score Key:  Scores 0-3: 0.9-1.7% risk of adverse cardiac event. In the HEART Score study, these patients were discharged (0.99% in the retrospective study, 1.7% in the prospective study)  Scores 4-6: 12-16.6% risk of adverse cardiac event. In the HEART Score study, these patients were admitted to the hospital. (11.6% retrospective, 16.6% prospective)  Scores ?7: 50-65% risk of adverse cardiac event. In the HEART Score study, these patients were candidates for early invasive measures. (65.2% retrospective, 50.1% prospective)    [RC]   0424 Patient's heart score is a 5.  Although his chest pain is somewhat atypical and sharp presentation.  Patient does have a past medical history of heart attacks and states his pain felt similar to previous heart attack tonight but not as severe.  He is a very poor historian, poorly controlled diabetic, and has no transportation and a hard time following up with any of his physicians.  Taking all the above into account, it is felt to be better served by admission to the hospital at this time for further  evaluation.  We will place a call to the hospitalist [RC]   0510 Discussed the patient's case with BUNNY Neville with [facility].  To admit to Dr. Bruce's care in a telemetry bed. [RC]   0519 Discovered patient should have been admitted to Dr. Atilio Melendrez.  Called and discussed with Leonela Zelaya.  To leave as is for now and they will transfer later to Dr. Melendrez's care.  I updated Dr. Melendrez on the patient's case and he is aware and will manage further. [RC]      ED Course User Index  [RC] James Thomas III, PA       Plan to perform a cardiac work-up given the patient's complaint tonight of chest pain as well as his past medical history of coronary disease.  Will obtain EKG, chest x-ray, troponin, chemistries and blood counts.  We will provide nitroglycerin for pain we will rule out pneumothorax, non-STEMI, STEMI, unstable angina, acute aortic syndrome, and others.    Procedures:  Procedures      PPE: The patient wore a mask and I wore an N95 mask throughout the entire patient encounter.      The patient has started, but not completed, their COVID-19 vaccination series.    Diagnosis  Final diagnoses:   Chest pain, unspecified type   Hyperglycemia   Poorly controlled diabetes mellitus (HCC)       Note Disclaimer: At [facility], we believe that sharing information builds trust and better relationships. You are receiving this note because you recently visited [facility]. It is possible you will see health information before a provider has talked with you about it. This kind of information can be easy to misunderstand. To help you fully understand what it means for your health, we urge you to discuss this note with your provider.     Milind Olmstead MD  02/22/23 5973       Milind Olmstead MD  02/23/23 0799

## 2023-02-22 NOTE — NURSING NOTE
Girlfriend Rin called to go over med rec, patient is unsure of what he takes. Left message for her to call back

## 2023-02-22 NOTE — ED NOTES
Nursing report ED to floor  Eric Simms  63 y.o.  male    HPI :   Chief Complaint   Patient presents with    Chest Pain       Admitting doctor:   Jitendra Bruce MD    Admitting diagnosis:   The primary encounter diagnosis was Chest pain, unspecified type. Diagnoses of Hyperglycemia and Poorly controlled diabetes mellitus (HCC) were also pertinent to this visit.    Code status:   Current Code Status       Date Active Code Status Order ID Comments User Context       2/22/2023 0514 CPR (Attempt to Resuscitate) [id number]  Leonela Zelaya APRN ED        Question Answer    Code Status (Patient has no pulse and is not breathing) CPR (Attempt to Resuscitate)    Medical Interventions (Patient has pulse or is breathing) Full Support                    Allergies:   Patient has no known allergies.    Isolation:   No active isolations    Intake and Output  No intake or output data in the 24 hours ending 02/22/23 0528    Weight:       02/22/23  0030   Weight: 90.7 kg (200 lb)       Most recent vitals:   Vitals:    02/22/23 0421 02/22/23 0451 02/22/23 0521 02/22/23 0527   BP: 162/88 163/91 154/89    BP Location:       Patient Position:       Pulse: 62 59  60   Resp:       Temp:       TempSrc:       SpO2: 99% 95%  95%   Weight:       Height:           Active LDAs/IV Access:   Lines, Drains & Airways       Active LDAs       Name Placement date Placement time Site Days    Peripheral IV 02/22/23 0115 Anterior;Left;Proximal Forearm 02/22/23 0115  Forearm  less than 1                    Labs (abnormal labs have a star):   Labs Reviewed   COMPREHENSIVE METABOLIC PANEL - Abnormal; Notable for the following components:       Result Value    Glucose 190 (*)     Creatinine 1.28 (*)     Sodium 135 (*)     All other components within normal limits    Narrative:     GFR Normal >60  Chronic Kidney Disease <60  Kidney Failure <15     TROPONIN - Abnormal; Notable for the following components:    HS Troponin T 23 (*)     All other components  within normal limits    Narrative:     High Sensitive Troponin T Reference Range:  <10.0 ng/L- Negative Female for AMI  <15.0 ng/L- Negative Male for AMI  >=10 - Abnormal Female indicating possible myocardial injury.  >=15 - Abnormal Male indicating possible myocardial injury.   Clinicians would have to utilize clinical acumen, EKG, Troponin, and serial changes to determine if it is an Acute Myocardial Infarction or myocardial injury due to an underlying chronic condition.        CBC WITH AUTO DIFFERENTIAL - Abnormal; Notable for the following components:    RBC 3.72 (*)     Hemoglobin 11.6 (*)     Hematocrit 34.8 (*)     All other components within normal limits   MAGNESIUM - Normal   HIGH SENSITIVITIY TROPONIN T 2HR   BASIC METABOLIC PANEL   CBC (NO DIFF)   TROPONIN   HEMOGLOBIN A1C   POCT GLUCOSE FINGERSTICK   POCT GLUCOSE FINGERSTICK   POCT GLUCOSE FINGERSTICK   CBC AND DIFFERENTIAL    Narrative:     The following orders were created for panel order CBC & Differential.  Procedure                               Abnormality         Status                     ---------                               -----------         ------                     CBC Auto Differential[[id number]]        Abnormal            Final result                 Please view results for these tests on the individual orders.       EKG:   ECG 12 Lead Chest Pain   Preliminary Result   HEART RATE= 66  bpm   RR Interval= 909  ms   NH Interval= 184  ms   P Horizontal Axis= 18  deg   P Front Axis= 48  deg   QRSD Interval= 114  ms   QT Interval= 440  ms   QRS Axis= 72  deg   T Wave Axis= 29  deg   - OTHERWISE NORMAL ECG -   Sinus rhythm   Borderline intraventricular conduction delay   Minimal ST elevation, anterior leads   Electronically Signed By:    Date and Time of Study: 2023-02-22 00:44:22          Meds given in ED:   Medications   sodium chloride 0.9 % flush 10 mL (has no administration in time range)   sodium chloride 0.9 % flush 10 mL (has no  administration in time range)   sodium chloride 0.9 % flush 10 mL (has no administration in time range)   sodium chloride 0.9 % infusion 40 mL (has no administration in time range)   nitroglycerin (NITROSTAT) SL tablet 0.4 mg (has no administration in time range)   acetaminophen (TYLENOL) tablet 650 mg (has no administration in time range)     Or   acetaminophen (TYLENOL) 160 MG/5ML solution 650 mg (has no administration in time range)     Or   acetaminophen (TYLENOL) suppository 650 mg (has no administration in time range)   ondansetron (ZOFRAN) tablet 4 mg (has no administration in time range)     Or   ondansetron (ZOFRAN) injection 4 mg (has no administration in time range)   calcium carbonate (TUMS) chewable tablet 500 mg (200 mg elemental) (has no administration in time range)   dextrose (GLUTOSE) oral gel 15 g (has no administration in time range)   dextrose (D50W) (25 g/50 mL) IV injection 25 g (has no administration in time range)   glucagon (GLUCAGEN) injection 1 mg (has no administration in time range)   insulin lispro (ADMELOG) injection 0-9 Units (has no administration in time range)   morphine injection 4 mg (4 mg Intravenous Given 2/22/23 0220)   ondansetron (ZOFRAN) injection 4 mg (4 mg Intravenous Given 2/22/23 0220)       Imaging results:  XR Chest 1 View    Result Date: 2/22/2023  Cardiomegaly. Possible vascular congestion.  This report was finalized on 2/22/2023 1:30 AM by Dr. Kamala Carrasco M.D.       Ambulatory status:   - with assist    Social issues:   Social History     Socioeconomic History    Marital status: Single       NIH Stroke Scale:         Mone Weiss RN  02/22/23 05:28 EST

## 2023-02-23 ENCOUNTER — APPOINTMENT (OUTPATIENT)
Dept: NUCLEAR MEDICINE | Facility: HOSPITAL | Age: 63
End: 2023-02-23
Payer: COMMERCIAL

## 2023-02-23 VITALS
RESPIRATION RATE: 18 BRPM | WEIGHT: 214 LBS | OXYGEN SATURATION: 93 % | DIASTOLIC BLOOD PRESSURE: 76 MMHG | HEART RATE: 63 BPM | HEIGHT: 65 IN | TEMPERATURE: 97.4 F | BODY MASS INDEX: 35.65 KG/M2 | SYSTOLIC BLOOD PRESSURE: 144 MMHG

## 2023-02-23 LAB
ALBUMIN SERPL-MCNC: 4.4 G/DL (ref 3.5–5.2)
ALBUMIN/GLOB SERPL: 1.6 G/DL
ALP SERPL-CCNC: 73 U/L (ref 39–117)
ALT SERPL W P-5'-P-CCNC: 19 U/L (ref 1–41)
ANION GAP SERPL CALCULATED.3IONS-SCNC: 11.6 MMOL/L (ref 5–15)
AORTIC DIMENSIONLESS INDEX: 0.8 (DI)
ASCENDING AORTA: 3.4 CM
AST SERPL-CCNC: 19 U/L (ref 1–40)
BASOPHILS # BLD AUTO: 0.06 10*3/MM3 (ref 0–0.2)
BASOPHILS NFR BLD AUTO: 0.8 % (ref 0–1.5)
BH CV ECHO MEAS - ACS: 2.35 CM
BH CV ECHO MEAS - AO MAX PG: 6.8 MMHG
BH CV ECHO MEAS - AO MEAN PG: 3.4 MMHG
BH CV ECHO MEAS - AO ROOT DIAM: 3.6 CM
BH CV ECHO MEAS - AO V2 MAX: 130.3 CM/SEC
BH CV ECHO MEAS - AO V2 VTI: 28.9 CM
BH CV ECHO MEAS - AVA(I,D): 3.4 CM2
BH CV ECHO MEAS - EDV(CUBED): 125 ML
BH CV ECHO MEAS - EDV(MOD-SP2): 108 ML
BH CV ECHO MEAS - EDV(MOD-SP4): 135 ML
BH CV ECHO MEAS - EF(MOD-BP): 56.3 %
BH CV ECHO MEAS - EF(MOD-SP2): 54.6 %
BH CV ECHO MEAS - EF(MOD-SP4): 57.8 %
BH CV ECHO MEAS - ESV(CUBED): 36.8 ML
BH CV ECHO MEAS - ESV(MOD-SP2): 49 ML
BH CV ECHO MEAS - ESV(MOD-SP4): 57 ML
BH CV ECHO MEAS - FS: 33.5 %
BH CV ECHO MEAS - IVS/LVPW: 1.09 CM
BH CV ECHO MEAS - IVSD: 1.06 CM
BH CV ECHO MEAS - LAT PEAK E' VEL: 9.6 CM/SEC
BH CV ECHO MEAS - LV MASS(C)D: 184.5 GRAMS
BH CV ECHO MEAS - LV MAX PG: 4.2 MMHG
BH CV ECHO MEAS - LV MEAN PG: 2 MMHG
BH CV ECHO MEAS - LV V1 MAX: 102.4 CM/SEC
BH CV ECHO MEAS - LV V1 VTI: 24.1 CM
BH CV ECHO MEAS - LVIDD: 5 CM
BH CV ECHO MEAS - LVIDS: 3.3 CM
BH CV ECHO MEAS - LVOT AREA: 4.1 CM2
BH CV ECHO MEAS - LVOT DIAM: 2.28 CM
BH CV ECHO MEAS - LVPWD: 0.96 CM
BH CV ECHO MEAS - MED PEAK E' VEL: 8.4 CM/SEC
BH CV ECHO MEAS - MV A DUR: 0.14 SEC
BH CV ECHO MEAS - MV A MAX VEL: 89.1 CM/SEC
BH CV ECHO MEAS - MV DEC SLOPE: 437.3 CM/SEC2
BH CV ECHO MEAS - MV DEC TIME: 145 MSEC
BH CV ECHO MEAS - MV E MAX VEL: 95.1 CM/SEC
BH CV ECHO MEAS - MV E/A: 1.07
BH CV ECHO MEAS - MV MAX PG: 5.4 MMHG
BH CV ECHO MEAS - MV MEAN PG: 2.22 MMHG
BH CV ECHO MEAS - MV P1/2T: 81.2 MSEC
BH CV ECHO MEAS - MV V2 VTI: 32.4 CM
BH CV ECHO MEAS - MVA(P1/2T): 2.7 CM2
BH CV ECHO MEAS - MVA(VTI): 3 CM2
BH CV ECHO MEAS - PA ACC TIME: 0.12 SEC
BH CV ECHO MEAS - PA PR(ACCEL): 25.5 MMHG
BH CV ECHO MEAS - PA V2 MAX: 107.2 CM/SEC
BH CV ECHO MEAS - PULM A REVS DUR: 0.13 SEC
BH CV ECHO MEAS - PULM A REVS VEL: 33.7 CM/SEC
BH CV ECHO MEAS - PULM DIAS VEL: 49.6 CM/SEC
BH CV ECHO MEAS - PULM S/D: 1.47
BH CV ECHO MEAS - PULM SYS VEL: 73.1 CM/SEC
BH CV ECHO MEAS - QP/QS: 0.41
BH CV ECHO MEAS - RAP SYSTOLE: 3 MMHG
BH CV ECHO MEAS - RV MAX PG: 1.7 MMHG
BH CV ECHO MEAS - RV V1 MAX: 65.1 CM/SEC
BH CV ECHO MEAS - RV V1 VTI: 15.4 CM
BH CV ECHO MEAS - RVOT DIAM: 1.83 CM
BH CV ECHO MEAS - RVSP: 22.7 MMHG
BH CV ECHO MEAS - SV(LVOT): 98 ML
BH CV ECHO MEAS - SV(MOD-SP2): 59 ML
BH CV ECHO MEAS - SV(MOD-SP4): 78 ML
BH CV ECHO MEAS - SV(RVOT): 40.4 ML
BH CV ECHO MEAS - TAPSE (>1.6): 2 CM
BH CV ECHO MEAS - TR MAX PG: 19.7 MMHG
BH CV ECHO MEAS - TR MAX VEL: 222 CM/SEC
BH CV ECHO MEASUREMENTS AVERAGE E/E' RATIO: 10.57
BH CV REST NUCLEAR ISOTOPE DOSE: 9.7 MCI
BH CV STRESS COMMENTS STAGE 1: NORMAL
BH CV STRESS DOSE REGADENOSON STAGE 1: 0.4
BH CV STRESS DURATION MIN STAGE 1: 0
BH CV STRESS DURATION SEC STAGE 1: 10
BH CV STRESS NUCLEAR ISOTOPE DOSE: 31.6 MCI
BH CV STRESS PROTOCOL 1: NORMAL
BH CV STRESS RECOVERY BP: NORMAL MMHG
BH CV STRESS RECOVERY HR: 73 BPM
BH CV STRESS STAGE 1: 1
BH CV VAS BP RIGHT ARM: NORMAL MMHG
BH CV XLRA - RV BASE: 3.7 CM
BH CV XLRA - RV LENGTH: 7 CM
BH CV XLRA - RV MID: 3.4 CM
BH CV XLRA - TDI S': 9.4 CM/SEC
BILIRUB SERPL-MCNC: 0.4 MG/DL (ref 0–1.2)
BUN SERPL-MCNC: 21 MG/DL (ref 8–23)
BUN/CREAT SERPL: 16 (ref 7–25)
CALCIUM SPEC-SCNC: 9.1 MG/DL (ref 8.6–10.5)
CHLORIDE SERPL-SCNC: 101 MMOL/L (ref 98–107)
CO2 SERPL-SCNC: 25.4 MMOL/L (ref 22–29)
CREAT SERPL-MCNC: 1.31 MG/DL (ref 0.76–1.27)
DEPRECATED RDW RBC AUTO: 47.7 FL (ref 37–54)
EGFRCR SERPLBLD CKD-EPI 2021: 61.2 ML/MIN/1.73
EOSINOPHIL # BLD AUTO: 0.27 10*3/MM3 (ref 0–0.4)
EOSINOPHIL NFR BLD AUTO: 3.8 % (ref 0.3–6.2)
ERYTHROCYTE [DISTWIDTH] IN BLOOD BY AUTOMATED COUNT: 13.6 % (ref 12.3–15.4)
GLOBULIN UR ELPH-MCNC: 2.8 GM/DL
GLUCOSE BLDC GLUCOMTR-MCNC: 169 MG/DL (ref 70–130)
GLUCOSE BLDC GLUCOMTR-MCNC: 230 MG/DL (ref 70–130)
GLUCOSE SERPL-MCNC: 197 MG/DL (ref 65–99)
HCT VFR BLD AUTO: 37.8 % (ref 37.5–51)
HGB BLD-MCNC: 12.6 G/DL (ref 13–17.7)
IMM GRANULOCYTES # BLD AUTO: 0.04 10*3/MM3 (ref 0–0.05)
IMM GRANULOCYTES NFR BLD AUTO: 0.6 % (ref 0–0.5)
LEFT ATRIUM VOLUME INDEX: 46 ML/M2
LV EF NUC BP: 60 %
LYMPHOCYTES # BLD AUTO: 1.62 10*3/MM3 (ref 0.7–3.1)
LYMPHOCYTES NFR BLD AUTO: 22.9 % (ref 19.6–45.3)
MAXIMAL PREDICTED HEART RATE: 157 BPM
MAXIMAL PREDICTED HEART RATE: 157 BPM
MCH RBC QN AUTO: 31.6 PG (ref 26.6–33)
MCHC RBC AUTO-ENTMCNC: 33.3 G/DL (ref 31.5–35.7)
MCV RBC AUTO: 94.7 FL (ref 79–97)
MONOCYTES # BLD AUTO: 0.65 10*3/MM3 (ref 0.1–0.9)
MONOCYTES NFR BLD AUTO: 9.2 % (ref 5–12)
NEUTROPHILS NFR BLD AUTO: 4.42 10*3/MM3 (ref 1.7–7)
NEUTROPHILS NFR BLD AUTO: 62.7 % (ref 42.7–76)
NRBC BLD AUTO-RTO: 0 /100 WBC (ref 0–0.2)
PERCENT MAX PREDICTED HR: 51.59 %
PLATELET # BLD AUTO: 155 10*3/MM3 (ref 140–450)
PMV BLD AUTO: 11.9 FL (ref 6–12)
POTASSIUM SERPL-SCNC: 4.6 MMOL/L (ref 3.5–5.2)
PROT SERPL-MCNC: 7.2 G/DL (ref 6–8.5)
RBC # BLD AUTO: 3.99 10*6/MM3 (ref 4.14–5.8)
SODIUM SERPL-SCNC: 138 MMOL/L (ref 136–145)
STJ: 3.2 CM
STRESS BASELINE BP: NORMAL MMHG
STRESS BASELINE HR: 65 BPM
STRESS PERCENT HR: 61 %
STRESS POST PEAK BP: NORMAL MMHG
STRESS POST PEAK HR: 81 BPM
STRESS TARGET HR: 133 BPM
STRESS TARGET HR: 133 BPM
WBC NRBC COR # BLD: 7.06 10*3/MM3 (ref 3.4–10.8)

## 2023-02-23 PROCEDURE — 85025 COMPLETE CBC W/AUTO DIFF WBC: CPT | Performed by: INTERNAL MEDICINE

## 2023-02-23 PROCEDURE — 93017 CV STRESS TEST TRACING ONLY: CPT

## 2023-02-23 PROCEDURE — A9500 TC99M SESTAMIBI: HCPCS | Performed by: HOSPITALIST

## 2023-02-23 PROCEDURE — 0 TECHNETIUM SESTAMIBI: Performed by: HOSPITALIST

## 2023-02-23 PROCEDURE — 82962 GLUCOSE BLOOD TEST: CPT

## 2023-02-23 PROCEDURE — 0 TECHNETIUM SESTAMIBI: Performed by: INTERNAL MEDICINE

## 2023-02-23 PROCEDURE — 78452 HT MUSCLE IMAGE SPECT MULT: CPT

## 2023-02-23 PROCEDURE — G0378 HOSPITAL OBSERVATION PER HR: HCPCS

## 2023-02-23 PROCEDURE — A9500 TC99M SESTAMIBI: HCPCS | Performed by: INTERNAL MEDICINE

## 2023-02-23 PROCEDURE — 25010000002 REGADENOSON 0.4 MG/5ML SOLUTION: Performed by: HOSPITALIST

## 2023-02-23 PROCEDURE — 80053 COMPREHEN METABOLIC PANEL: CPT | Performed by: INTERNAL MEDICINE

## 2023-02-23 PROCEDURE — 63710000001 INSULIN LISPRO (HUMAN) PER 5 UNITS: Performed by: NURSE PRACTITIONER

## 2023-02-23 RX ADMIN — METOPROLOL TARTRATE 50 MG: 25 TABLET, FILM COATED ORAL at 10:04

## 2023-02-23 RX ADMIN — Medication 10 ML: at 10:05

## 2023-02-23 RX ADMIN — PANTOPRAZOLE SODIUM 40 MG: 40 TABLET, DELAYED RELEASE ORAL at 06:30

## 2023-02-23 RX ADMIN — LOSARTAN POTASSIUM 100 MG: 100 TABLET, FILM COATED ORAL at 10:04

## 2023-02-23 RX ADMIN — AMLODIPINE BESYLATE 10 MG: 10 TABLET ORAL at 10:04

## 2023-02-23 RX ADMIN — LEVETIRACETAM 500 MG: 500 TABLET, FILM COATED ORAL at 10:04

## 2023-02-23 RX ADMIN — CHLORTHALIDONE 25 MG: 25 TABLET ORAL at 10:04

## 2023-02-23 RX ADMIN — INSULIN GLARGINE-YFGN 30 UNITS: 100 INJECTION, SOLUTION SUBCUTANEOUS at 10:29

## 2023-02-23 RX ADMIN — ISOSORBIDE MONONITRATE 120 MG: 30 TABLET, EXTENDED RELEASE ORAL at 10:04

## 2023-02-23 RX ADMIN — TECHNETIUM TC 99M SESTAMIBI 1 DOSE: 1 INJECTION INTRAVENOUS at 06:39

## 2023-02-23 RX ADMIN — INSULIN LISPRO 4 UNITS: 100 INJECTION, SOLUTION INTRAVENOUS; SUBCUTANEOUS at 12:06

## 2023-02-23 RX ADMIN — REGADENOSON 0.4 MG: 0.08 INJECTION, SOLUTION INTRAVENOUS at 08:15

## 2023-02-23 RX ADMIN — INSULIN LISPRO 3 UNITS: 100 INJECTION, SOLUTION INTRAVENOUS; SUBCUTANEOUS at 12:07

## 2023-02-23 RX ADMIN — ALLOPURINOL 100 MG: 100 TABLET ORAL at 10:04

## 2023-02-23 RX ADMIN — TECHNETIUM TC 99M SESTAMIBI 1 DOSE: 1 INJECTION INTRAVENOUS at 08:15

## 2023-02-23 RX ADMIN — CLOPIDOGREL 75 MG: 75 TABLET, FILM COATED ORAL at 10:04

## 2023-02-23 RX ADMIN — ATORVASTATIN CALCIUM 10 MG: 10 TABLET, FILM COATED ORAL at 10:04

## 2023-02-23 NOTE — PROGRESS NOTES
Eric Simms   63 y.o.  male    LOS: 0 days   Patient Care Team:  Erlin Madrigal MD as PCP - General (Family Medicine)      Subjective     Interval History:     Patient Complaints:  No complaints    Review of Systems:       Medication Review:   Current Facility-Administered Medications:   •  acetaminophen (TYLENOL) tablet 650 mg, 650 mg, Oral, Q4H PRN **OR** acetaminophen (TYLENOL) 160 MG/5ML solution 650 mg, 650 mg, Oral, Q4H PRN **OR** acetaminophen (TYLENOL) suppository 650 mg, 650 mg, Rectal, Q4H PRN, Leonela Zelaya APRN  •  allopurinol (ZYLOPRIM) tablet 100 mg, 100 mg, Oral, Daily, Anthony Drake APRN, 100 mg at 02/22/23 1030  •  amLODIPine (NORVASC) tablet 10 mg, 10 mg, Oral, Daily, Anthony Drake APRN, 10 mg at 02/22/23 1031  •  atorvastatin (LIPITOR) tablet 10 mg, 10 mg, Oral, Daily, Anthony Drake APRN, 10 mg at 02/22/23 1031  •  calcium carbonate (TUMS) chewable tablet 500 mg (200 mg elemental), 2 tablet, Oral, BID PRN, Leonela Zelaya APRN  •  chlorthalidone (HYGROTON) tablet 25 mg, 25 mg, Oral, Daily, Anthony Drake APRN  •  clopidogrel (PLAVIX) tablet 75 mg, 75 mg, Oral, Daily, Anthony Drake APRN, 75 mg at 02/22/23 1031  •  dextrose (D50W) (25 g/50 mL) IV injection 25 g, 25 g, Intravenous, Q15 Min PRN, Leonela Zelaya APRN  •  dextrose (GLUTOSE) oral gel 15 g, 15 g, Oral, Q15 Min PRN, Leonela Zelaya APRN  •  gabapentin (NEURONTIN) capsule 800 mg, 800 mg, Oral, TID PRN, Anthony Drake APRN, 800 mg at 02/22/23 2035  •  glucagon (GLUCAGEN) injection 1 mg, 1 mg, Subcutaneous, PRN, Leonela Zelaya APRN  •  HYDROcodone-acetaminophen (NORCO) 7.5-325 MG per tablet 1 tablet, 1 tablet, Oral, Q8H PRN, Anthony Drake APRN  •  insulin glargine (LANTUS, SEMGLEE) injection 30 Units, 30 Units, Subcutaneous, Q12H, Anthony Drake APRN, 30 Units at 02/22/23 2118  •  insulin lispro (ADMELOG) injection 0-9 Units, 0-9 Units, Subcutaneous, 4x Daily With Meals & Nightly, Nathalie,  BUNNY Rodriguez, 6 Units at 02/22/23 2032  •  insulin lispro (ADMELOG) injection 3 Units, 3 Units, Subcutaneous, TID With Meals, Anthony Drake APRN, 3 Units at 02/22/23 1810  •  isosorbide mononitrate (IMDUR) 24 hr tablet 120 mg, 120 mg, Oral, Daily, Anthony Drake APRN, 120 mg at 02/22/23 1031  •  levETIRAcetam (KEPPRA) tablet 500 mg, 500 mg, Oral, BID, Anthony Drake APRN, 500 mg at 02/22/23 2025  •  losartan (COZAAR) tablet 100 mg, 100 mg, Oral, Daily, Anthony Drake APRN, 100 mg at 02/22/23 1030  •  metoprolol tartrate (LOPRESSOR) tablet 50 mg, 50 mg, Oral, BID, Anthony Drake APRN, 50 mg at 02/22/23 2025  •  montelukast (SINGULAIR) tablet 10 mg, 10 mg, Oral, Nightly, Anthony Drake APRN, 10 mg at 02/22/23 2025  •  nitroglycerin (NITROSTAT) SL tablet 0.4 mg, 0.4 mg, Sublingual, Q5 Min PRN, Leonela Zelaya APRN  •  ondansetron (ZOFRAN) tablet 4 mg, 4 mg, Oral, Q6H PRN **OR** ondansetron (ZOFRAN) injection 4 mg, 4 mg, Intravenous, Q6H PRN, Leonela Zelaya APRN  •  pantoprazole (PROTONIX) EC tablet 40 mg, 40 mg, Oral, Q AM, Anthony Drake APRN, 40 mg at 02/23/23 0630  •  [COMPLETED] Insert Peripheral IV, , , Once **AND** sodium chloride 0.9 % flush 10 mL, 10 mL, Intravenous, PRN, James Thomas III, PA  •  sodium chloride 0.9 % flush 10 mL, 10 mL, Intravenous, Q12H, Leonela Zelaya APRN, 10 mL at 02/22/23 2028  •  sodium chloride 0.9 % flush 10 mL, 10 mL, Intravenous, PRN, Leonela Zelaya APRN  •  sodium chloride 0.9 % infusion 40 mL, 40 mL, Intravenous, PRN, Leonela Zelaya APRN  •  traZODone (DESYREL) tablet 100 mg, 100 mg, Oral, Nightly, Anthony Drake APRN, 100 mg at 02/22/23 2210      Objective   Vital Sign Min/Max for last 24 hours  Temp  Min: 97.4 °F (36.3 °C)  Max: 97.6 °F (36.4 °C)   BP  Min: 127/80  Max: 149/75    Pulse  Min: 61  Max: 61     Wt Readings from Last 3 Encounters:   02/22/23 97.1 kg (214 lb)      No intake or output data in the 24  hours ending 02/23/23 0846  Physical Exam:      General Appearance:    Well developed and well nourished male in no acute distress   Head:    Normocephalic, atraumatic   Eyes:            Conjunctivae normal, anicteric, no xanthelasma   Neck:   supple, trachea midline, no thyromegaly, no carotid bruit, no JVD, no elevated CVP   Lungs:     Clear to auscultation,respirations regular, even and                  unlabored    Heart:    Regular rhythm and normal rate, normal S1 and S2,            No murmur, no gallop, no rub, no click   Chest Wall:    No abnormalities observed   Abdomen:     Normal bowel sounds, no masses, no organomegaly, soft        nontender, nondistended, no guarding, no rebound                tenderness   Rectal:     Deferred   Extremities:   No edema. Moves all extremities well, no cyanosis, no erythema   Pulses:   Pulses palpable and equal bilaterally   Skin:   No bleeding, bruising or rash   Neurologic:   awake alert and oriented x3, speech clear and approp, no facial drooping     : voids   Monitor:  nsr    Results Review:         Sodium Sodium   Date Value Ref Range Status   02/22/2023 141 136 - 145 mmol/L Final   02/22/2023 135 (L) 136 - 145 mmol/L Final      Potassium Potassium   Date Value Ref Range Status   02/22/2023 4.6 3.5 - 5.2 mmol/L Final   02/22/2023 4.3 3.5 - 5.2 mmol/L Final      Chloride Chloride   Date Value Ref Range Status   02/22/2023 106 98 - 107 mmol/L Final   02/22/2023 101 98 - 107 mmol/L Final      Bicarbonate No results found for: PLASMABICARB   BUN BUN   Date Value Ref Range Status   02/22/2023 16 8 - 23 mg/dL Final   02/22/2023 18 8 - 23 mg/dL Final      Creatinine Creatinine   Date Value Ref Range Status   02/22/2023 1.30 (H) 0.76 - 1.27 mg/dL Final   02/22/2023 1.28 (H) 0.76 - 1.27 mg/dL Final      Calcium Calcium   Date Value Ref Range Status   02/22/2023 8.9 8.6 - 10.5 mg/dL Final   02/22/2023 9.1 8.6 - 10.5 mg/dL Final      Magnesium Magnesium   Date Value Ref Range  Status   02/22/2023 2.0 1.6 - 2.4 mg/dL Final        Results from last 7 days   Lab Units 02/22/23  0648   WBC 10*3/mm3 7.45   HEMOGLOBIN g/dL 11.9*   HEMATOCRIT % 35.5*   PLATELETS 10*3/mm3 175     Lab Results   Lab Value Date/Time    TROPONINT 25 (H) 02/22/2023 0648    TROPONINT 23 (H) 02/22/2023 0223            Echo EF Estimated  No results found for: ECHOEFEST      Assessment/ Plan  Assessment & Plan   Active Hospital Problems    Diagnosis  POA   • **Chest pain, unspecified type [R07.9]  Yes     Priority: Low   • Type 2 diabetes mellitus with hyperglycemia (HCC) [E11.65]  Yes     Priority: Low   • HTN (hypertension) [I10]  Yes     Priority: Low   • Obesity (BMI 30-39.9) [E66.9]  Yes     Priority: Low   • Anemia, chronic disease [D63.8]  Yes     Priority: Low   • CKD (chronic kidney disease) stage 2, GFR 60-89 ml/min [N18.2]  Yes     Priority: Low   • Seizure disorder (HCC) [G40.909]  Yes     Priority: Low       1. Chest pain  Hs TROP 25 <- 23  CXR CARDIOMEGALY, POSSIBLE VASCULAR CONGESTION  Echo done 12/2022 unable to retrieve results       2. CAD with PCI pDiag 8/2/2018, h/o LAD stent 11/13/2016     3. DM  Poor control hga1c 12.3     4. Htn     5. Dsyslipidemia     PLAN  Atypical cp with trop neg X2 No MI, no ischemic changes on 12 lead ekg,  2d echo, lexiscan stress in progress  He has follow up appt with dr del rio 5/10/2023       Kennedi Suarez, APRN  02/23/23  08:46 EST  •  Left ventricular systolic function is normal. Left ventricular ejection fraction appears to be 56 - 60%.  •  Left ventricular diastolic function was normal.  •  Estimated right ventricular systolic pressure from tricuspid regurgitation is normal (<35 mmHg).     •  Left ventricular ejection fraction is normal (Calculated EF = 60%).  •  Myocardial perfusion imaging indicates a small-sized area of ischemia located in the inferior wall.  •  Impressions are consistent with a low risk study.   OK to DC on currant meds

## 2023-02-23 NOTE — PLAN OF CARE
Problem: Adult Inpatient Plan of Care  Goal: Plan of Care Review  2/23/2023 0621 by Mary Lou Perrin RN  Outcome: Ongoing, Progressing  Flowsheets (Taken 2/23/2023 0620)  Progress: improving  Plan of Care Reviewed With: patient  Outcome Evaluation: Pt had no complaints through the night. Administered prescribed meds. Pt slept.  2/23/2023 0619 by Mary Lou Perrin RN  Outcome: Ongoing, Progressing  Flowsheets (Taken 2/23/2023 0541)  Progress: --  Plan of Care Reviewed With: patient  Outcome Evaluation: Administered meds. Pt's  2/23/2023 0537 by Mary Lou Perrin RN  Outcome: Ongoing, Progressing  Flowsheets (Taken 2/23/2023 0537)  Progress: improving  Plan of Care Reviewed With: patient  2/23/2023 0536 by Mary Lou Perrin RN  Outcome: Ongoing, Progressing  Flowsheets (Taken 2/23/2023 0536)  Plan of Care Reviewed With: patient  Goal: Patient-Specific Goal (Individualized)  2/23/2023 0621 by Mary Lou Perrin RN  Outcome: Ongoing, Progressing  2/23/2023 0619 by Mary Lou Perrin RN  Outcome: Ongoing, Progressing  2/23/2023 0537 by Mary Lou Perrin RN  Outcome: Ongoing, Progressing  2/23/2023 0536 by Mary Lou Perrin RN  Outcome: Ongoing, Progressing  Goal: Absence of Hospital-Acquired Illness or Injury  2/23/2023 0621 by Mary Lou Perrin RN  Outcome: Ongoing, Progressing  2/23/2023 0619 by Mary Lou Perrin RN  Outcome: Ongoing, Progressing  2/23/2023 0537 by Mary Lou Perrin RN  Outcome: Ongoing, Progressing  2/23/2023 0536 by Mary Lou Perrin RN  Outcome: Ongoing, Progressing  Intervention: Identify and Manage Fall Risk  Recent Flowsheet Documentation  Taken 2/23/2023 0400 by Mary Lou Perrin RN  Safety Promotion/Fall Prevention:   clutter free environment maintained   elopement precautions  Taken 2/23/2023 0200 by Mary Lou Perrin RN  Safety Promotion/Fall Prevention:   clutter free environment maintained   fall prevention program maintained   lighting adjusted   mobility aid in reach   nonskid shoes/slippers when out of bed   room organization  consistent   safety round/check completed  Taken 2/23/2023 0000 by Mary Lou Perrin RN  Safety Promotion/Fall Prevention:   clutter free environment maintained   fall prevention program maintained   lighting adjusted   mobility aid in reach   nonskid shoes/slippers when out of bed   room organization consistent   safety round/check completed  Taken 2/22/2023 2200 by Mary Lou Perrin RN  Safety Promotion/Fall Prevention:   clutter free environment maintained   fall prevention program maintained   lighting adjusted   nonskid shoes/slippers when out of bed   muscle strengthening facilitated   room organization consistent   safety round/check completed  Taken 2/22/2023 1906 by Mary Lou Perrin RN  Safety Promotion/Fall Prevention:   assistive device/personal items within reach   clutter free environment maintained   fall prevention program maintained   lighting adjusted   mobility aid in reach   nonskid shoes/slippers when out of bed   room organization consistent   safety round/check completed  Intervention: Prevent Skin Injury  Recent Flowsheet Documentation  Taken 2/23/2023 0400 by Mary Lou Perrin RN  Body Position: position changed independently  Taken 2/23/2023 0200 by Mary Lou Perrin RN  Body Position: position changed independently  Taken 2/23/2023 0000 by Mary Lou Perrin RN  Body Position: position changed independently  Taken 2/22/2023 2200 by Mary Lou Perrin RN  Body Position: position changed independently  Taken 2/22/2023 1906 by Mary Lou Perrin RN  Body Position: position changed independently  Intervention: Prevent and Manage VTE (Venous Thromboembolism) Risk  Recent Flowsheet Documentation  Taken 2/23/2023 0400 by Mary Lou Perrin RN  Activity Management:   activity adjusted per tolerance   activity encouraged  Taken 2/23/2023 0200 by Mary Lou Perrin RN  Activity Management:   activity adjusted per tolerance   activity encouraged  Taken 2/23/2023 0000 by Mary Lou Perrin RN  Activity Management:   activity adjusted per  tolerance   activity encouraged  Taken 2/22/2023 2200 by Mary Lou Perrin RN  Activity Management:   activity adjusted per tolerance   activity encouraged  Taken 2/22/2023 1906 by Mary Lou Perrin RN  Activity Management: activity adjusted per tolerance  VTE Prevention/Management: compression stockings off  Intervention: Prevent Infection  Recent Flowsheet Documentation  Taken 2/23/2023 0400 by Mary Lou Perrin RN  Infection Prevention:   hand hygiene promoted   rest/sleep promoted   single patient room provided  Taken 2/23/2023 0200 by Mary Lou Perrin RN  Infection Prevention:   hand hygiene promoted   rest/sleep promoted   single patient room provided  Taken 2/22/2023 2200 by Mary Lou Perrin RN  Infection Prevention:   hand hygiene promoted   single patient room provided   rest/sleep promoted  Goal: Optimal Comfort and Wellbeing  2/23/2023 0621 by Mary Lou Perrin RN  Outcome: Ongoing, Progressing  2/23/2023 0619 by Mary Lou Perrin RN  Outcome: Ongoing, Progressing  2/23/2023 0537 by Mary Lou Perrin RN  Outcome: Ongoing, Progressing  2/23/2023 0536 by Mary Lou Perrin RN  Outcome: Ongoing, Progressing  Intervention: Provide Person-Centered Care  Recent Flowsheet Documentation  Taken 2/22/2023 1906 by Mary Lou Perrin RN  Trust Relationship/Rapport:   care explained   questions encouraged   questions answered  Goal: Readiness for Transition of Care  2/23/2023 0621 by Mary Lou Perrin RN  Outcome: Ongoing, Progressing  2/23/2023 0619 by Mary Lou Perrin RN  Outcome: Ongoing, Progressing  2/23/2023 0537 by Mary Lou Perrin RN  Outcome: Ongoing, Progressing  2/23/2023 0536 by Mary Lou Perrin RN  Outcome: Ongoing, Progressing     Problem: Chest Pain  Goal: Resolution of Chest Pain Symptoms  2/23/2023 0621 by Mary Lou Perrin RN  Outcome: Ongoing, Progressing  2/23/2023 0619 by Mary Lou Perrin RN  Outcome: Ongoing, Progressing  2/23/2023 0537 by Mary Lou Perrin RN  Outcome: Ongoing, Progressing  2/23/2023 0536 by Mary Lou Perrin RN  Outcome:  Ongoing, Progressing   Goal Outcome Evaluation:  Plan of Care Reviewed With: patient        Progress: improving  Outcome Evaluation: Pt had no complaints through the night. Administered prescribed meds. Pt slept.

## 2023-02-23 NOTE — DISCHARGE SUMMARY
Discharge summary    Date of admission 2/22/2023  Date of discharge 2/23/2023    Final diagnosis  Atypical chest pain  Coronary artery disease  Insulin-dependent diabetes mellitus  Hypertension  Hyperlipidemia  Seizure disorder  Depression  Chronic kidney disease stage II   Gastroesophageal reflux disease    Discharge medications    Current Facility-Administered Medications:   •  acetaminophen (TYLENOL) tablet 650 mg, 650 mg, Oral, Q4H PRN **OR** acetaminophen (TYLENOL) 160 MG/5ML solution 650 mg, 650 mg, Oral, Q4H PRN **OR** acetaminophen (TYLENOL) suppository 650 mg, 650 mg, Rectal, Q4H PRN, Leonela Zelaya APRN  •  allopurinol (ZYLOPRIM) tablet 100 mg, 100 mg, Oral, Daily, Anthony Drake APRN, 100 mg at 02/23/23 1004  •  amLODIPine (NORVASC) tablet 10 mg, 10 mg, Oral, Daily, Anthony Drake APRN, 10 mg at 02/23/23 1004  •  atorvastatin (LIPITOR) tablet 10 mg, 10 mg, Oral, Daily, Anthony Drake APRN, 10 mg at 02/23/23 1004  •  calcium carbonate (TUMS) chewable tablet 500 mg (200 mg elemental), 2 tablet, Oral, BID PRN, Leonela Zelaya APRN  •  chlorthalidone (HYGROTON) tablet 25 mg, 25 mg, Oral, Daily, Anthony Drake APRN, 25 mg at 02/23/23 1004  •  clopidogrel (PLAVIX) tablet 75 mg, 75 mg, Oral, Daily, Anthony Drake APRN, 75 mg at 02/23/23 1004  •  dextrose (D50W) (25 g/50 mL) IV injection 25 g, 25 g, Intravenous, Q15 Min PRN, Leonela Zelaya APRN  •  dextrose (GLUTOSE) oral gel 15 g, 15 g, Oral, Q15 Min PRN, Leonela Zelaya APRN  •  gabapentin (NEURONTIN) capsule 800 mg, 800 mg, Oral, TID PRN, Anthony Drake APRN, 800 mg at 02/22/23 2035  •  glucagon (GLUCAGEN) injection 1 mg, 1 mg, Subcutaneous, PRN, Leonela Zelaya APRN  •  HYDROcodone-acetaminophen (NORCO) 7.5-325 MG per tablet 1 tablet, 1 tablet, Oral, Q8H PRN, Anthony Drake APRN  •  insulin glargine (LANTUS, SEMGLEE) injection 30 Units, 30 Units, Subcutaneous, Q12H, Anthony Drake APRN, 30 Units at 02/23/23  1029  •  insulin lispro (ADMELOG) injection 0-9 Units, 0-9 Units, Subcutaneous, 4x Daily With Meals & Nightly, Leonela Zelaya APRN, 4 Units at 02/23/23 1206  •  insulin lispro (ADMELOG) injection 3 Units, 3 Units, Subcutaneous, TID With Meals, Anthony Drake APRN, 3 Units at 02/23/23 1207  •  isosorbide mononitrate (IMDUR) 24 hr tablet 120 mg, 120 mg, Oral, Daily, Anthony Drake APRN, 120 mg at 02/23/23 1004  •  levETIRAcetam (KEPPRA) tablet 500 mg, 500 mg, Oral, BID, Anthony Drake APRN, 500 mg at 02/23/23 1004  •  losartan (COZAAR) tablet 100 mg, 100 mg, Oral, Daily, Anthony Drake APRN, 100 mg at 02/23/23 1004  •  metoprolol tartrate (LOPRESSOR) tablet 50 mg, 50 mg, Oral, BID, Anthony Drake APRN, 50 mg at 02/23/23 1004  •  montelukast (SINGULAIR) tablet 10 mg, 10 mg, Oral, Nightly, Anthony Drake APRN, 10 mg at 02/22/23 2025  •  nitroglycerin (NITROSTAT) SL tablet 0.4 mg, 0.4 mg, Sublingual, Q5 Min PRN, Leonela Zelaya APRN  •  ondansetron (ZOFRAN) tablet 4 mg, 4 mg, Oral, Q6H PRN **OR** ondansetron (ZOFRAN) injection 4 mg, 4 mg, Intravenous, Q6H PRN, Leonela Zelaya APRN  •  pantoprazole (PROTONIX) EC tablet 40 mg, 40 mg, Oral, Q AM, Anthony Drake APRN, 40 mg at 02/23/23 0630  •  [COMPLETED] Insert Peripheral IV, , , Once **AND** sodium chloride 0.9 % flush 10 mL, 10 mL, Intravenous, PRN, James Thomas III, PA  •  sodium chloride 0.9 % flush 10 mL, 10 mL, Intravenous, Q12H, Leonela Zelaya APRN, 10 mL at 02/23/23 1005  •  sodium chloride 0.9 % flush 10 mL, 10 mL, Intravenous, PRN, Leonela Zelaya APRN  •  sodium chloride 0.9 % infusion 40 mL, 40 mL, Intravenous, PRN, Leonela Zelaya APRN  •  traZODone (DESYREL) tablet 100 mg, 100 mg, Oral, Nightly, Anthony Drake APRN, 100 mg at 02/22/23 2210     Consults obtained  Cardiology    Procedures  Stress Cardiolite which showed small area of ischemia  2D echo within normal limit with ejection fraction  60%    Hospital course  63 white male with multiple medical problem including diabetes hypertension hyperlipidemia and known coronary artery disease admitted to emergency room with chest pain intermittent with no associated symptoms.  Patient admitted for further work-up.  Patient ruled out for MI of enzymes and EKG.  Patient  2D echo within normal limit and he underwent stress Cardiolite which showed small area of ischemia and cardiology recommend no further work-up and continue home medication including Imdur.  Patient is totally pain-free.  Patient wants to go home and he has poorly controlled diabetes mellitus and he stated he will follow-up with his endocrine doctor.    Discharge diet regular    Activity as tolerated    Medication as above    Follow-up with [facility] doctor in 1 week and follow-up with cardiology per the instructions and take medication as directed.    JAIME PARK MD

## 2023-02-23 NOTE — PLAN OF CARE
Problem: Adult Inpatient Plan of Care  Goal: Plan of Care Review  Outcome: Met  Goal: Patient-Specific Goal (Individualized)  Outcome: Met  Goal: Absence of Hospital-Acquired Illness or Injury  Outcome: Met  Intervention: Identify and Manage Fall Risk  Recent Flowsheet Documentation  Taken 2/23/2023 1400 by Wendy Cool RN  Safety Promotion/Fall Prevention:   activity supervised   assistive device/personal items within reach   clutter free environment maintained   fall prevention program maintained   nonskid shoes/slippers when out of bed  Taken 2/23/2023 1200 by Wendy Cool RN  Safety Promotion/Fall Prevention:   assistive device/personal items within reach   clutter free environment maintained   fall prevention program maintained   nonskid shoes/slippers when out of bed  Taken 2/23/2023 1000 by Wendy Cool RN  Safety Promotion/Fall Prevention:   activity supervised   assistive device/personal items within reach   clutter free environment maintained   fall prevention program maintained   nonskid shoes/slippers when out of bed   safety round/check completed  Intervention: Prevent and Manage VTE (Venous Thromboembolism) Risk  Recent Flowsheet Documentation  Taken 2/23/2023 1400 by Wendy Cool RN  Activity Management: activity adjusted per tolerance  Taken 2/23/2023 1200 by Wendy Cool RN  Activity Management: activity adjusted per tolerance  Taken 2/23/2023 1000 by Wendy Cool RN  Activity Management: activity adjusted per tolerance  Intervention: Prevent Infection  Recent Flowsheet Documentation  Taken 2/23/2023 1400 by Wendy Cool RN  Infection Prevention: single patient room provided  Taken 2/23/2023 1200 by Wendy Cool RN  Infection Prevention: single patient room provided  Taken 2/23/2023 1000 by Wendy Cool RN  Infection Prevention: single patient room provided  Goal: Optimal Comfort and Wellbeing  Outcome: Met  Intervention: Provide Person-Centered Care  Recent  Flowsheet Documentation  Taken 2/23/2023 1400 by Wendy Cool, RN  Trust Relationship/Rapport:   care explained   choices provided  Taken 2/23/2023 1000 by Wendy Cool, RN  Trust Relationship/Rapport:   care explained   choices provided  Goal: Readiness for Transition of Care  Outcome: Met   Goal Outcome Evaluation:

## 2023-02-23 NOTE — H&P
Daily progress note    Patient Name:  Eric Simms  YOB: 1960  MRN:  [id number]  Admit Date:  2/22/2023  Patient Care Team:  Erlin Madrigal MD as PCP - General (Family Medicine)      Subjective    Awake and alert and feeling much better denies any chest pain shortness of breath palpitation and wants to go home.    History Present Illness     Mr. Simms is a 63 y.o. former smoker with a history of diabetes mellitus type 2, hypertension, hyperlipidemia, GERD, peripheral neuropathy, seizures, CVA that presents to [facility] complaining of chest pain.    Plan for EGD with dilatation at [place] at end of January 2023 & instead went to ER for elevated blood sugar.      Patient states intermittent, alternating chest pain since end of December 2022 & previously evaluated at [facility]; however, limited historian regarding medical history.  Previously evaluated by Dr. Johnson.     Watching a basketball game last night on 2/21/2023 & developed intractable chest pain described as paralyzing; therefore, family notified EMS.    Took nitro x1 with slight relief.    Recommendation pending hospital course.  Details below.    Chest Pain   Associated symptoms include dizziness. Pertinent negatives include no abdominal pain, cough, fever, nausea, shortness of breath or vomiting.     Review of Systems   Constitutional: Negative for chills and fever.   HENT: Negative for congestion and rhinorrhea.    Respiratory: Negative for cough and shortness of breath.    Cardiovascular: Positive for chest pain. Negative for leg swelling.   Gastrointestinal: Negative for abdominal pain, constipation, diarrhea, nausea and vomiting.   Endocrine: Negative for polydipsia, polyphagia and polyuria.   Genitourinary: Negative for difficulty urinating and dysuria.   Musculoskeletal: Positive for gait problem (due to generalized weakness). Negative for myalgias.   Skin: Negative for rash and wound.   Neurological:  "Positive for dizziness. Negative for syncope and light-headedness.   Psychiatric/Behavioral: Negative for confusion and hallucinations.      Vital Signs  Blood pressure 144/76, pulse 63, temperature 97.4 °F (36.3 °C), temperature source Oral, resp. rate 18, height 165.1 cm (65\"), weight 97.1 kg (214 lb), SpO2 93 %.      Physical Exam  Constitutional:       General: He is not in acute distress.     Appearance: He is obese. He is not toxic-appearing.   HENT:      Head: Normocephalic and atraumatic.   Eyes:      Extraocular Movements: Extraocular movements intact.      Conjunctiva/sclera: Conjunctivae normal.   Cardiovascular:      Rate and Rhythm: Normal rate.      Heart sounds: Normal heart sounds.   Pulmonary:      Effort: Pulmonary effort is normal.      Breath sounds: Normal breath sounds.   Abdominal:      General: Bowel sounds are normal.      Palpations: Abdomen is soft.   Musculoskeletal:      Cervical back: Normal range of motion and neck supple.      Right lower leg: Edema (non-pitting edema, BLE) present.      Left lower leg: Edema present.   Skin:     General: Skin is warm and dry.   Neurological:      Mental Status: He is alert and oriented to person, place, and time.      Cranial Nerves: No cranial nerve deficit.       Results Review:  Lab Results (last 24 hours)     Procedure Component Value Units Date/Time    POC Glucose Once [[id number]]  (Abnormal) Collected: 02/22/23 1627    Specimen: Blood Updated: 02/22/23 1628     Glucose 229 mg/dL      Comment: Meter: [id number] : [id number] Franklyn ARENAS       POC Glucose Once [[id number]]  (Abnormal) Collected: 02/22/23 2024    Specimen: Blood Updated: 02/22/23 2025     Glucose 297 mg/dL      Comment: Meter: [id number] : bartolome Barreto RN       POC Glucose Once [[id number]]  (Abnormal) Collected: 02/23/23 0610    Specimen: Blood Updated: 02/23/23 0611     Glucose 169 mg/dL      Comment: Meter: [id number] : [id number] Aidee Antunez" NA       CBC & Differential [[id number]]  (Abnormal) Collected: 02/23/23 0951    Specimen: Blood Updated: 02/23/23 1015    Narrative:      The following orders were created for panel order CBC & Differential.  Procedure                               Abnormality         Status                     ---------                               -----------         ------                     CBC Auto Differential[[id number]]        Abnormal            Final result                 Please view results for these tests on the individual orders.    Comprehensive Metabolic Panel [[id number]]  (Abnormal) Collected: 02/23/23 0951    Specimen: Blood Updated: 02/23/23 1113     Glucose 197 mg/dL      BUN 21 mg/dL      Creatinine 1.31 mg/dL      Sodium 138 mmol/L      Potassium 4.6 mmol/L      Chloride 101 mmol/L      CO2 25.4 mmol/L      Calcium 9.1 mg/dL      Total Protein 7.2 g/dL      Albumin 4.4 g/dL      ALT (SGPT) 19 U/L      AST (SGOT) 19 U/L      Alkaline Phosphatase 73 U/L      Total Bilirubin 0.4 mg/dL      Globulin 2.8 gm/dL      A/G Ratio 1.6 g/dL      BUN/Creatinine Ratio 16.0     Anion Gap 11.6 mmol/L      eGFR 61.2 mL/min/1.73     Narrative:      GFR Normal >60  Chronic Kidney Disease <60  Kidney Failure <15      CBC Auto Differential [[id number]]  (Abnormal) Collected: 02/23/23 0951    Specimen: Blood Updated: 02/23/23 1015     WBC 7.06 10*3/mm3      RBC 3.99 10*6/mm3      Hemoglobin 12.6 g/dL      Hematocrit 37.8 %      MCV 94.7 fL      MCH 31.6 pg      MCHC 33.3 g/dL      RDW 13.6 %      RDW-SD 47.7 fl      MPV 11.9 fL      Platelets 155 10*3/mm3      Neutrophil % 62.7 %      Lymphocyte % 22.9 %      Monocyte % 9.2 %      Eosinophil % 3.8 %      Basophil % 0.8 %      Immature Grans % 0.6 %      Neutrophils, Absolute 4.42 10*3/mm3      Lymphocytes, Absolute 1.62 10*3/mm3      Monocytes, Absolute 0.65 10*3/mm3      Eosinophils, Absolute 0.27 10*3/mm3      Basophils, Absolute 0.06 10*3/mm3      Immature Grans, Absolute 0.04  10*3/mm3      nRBC 0.0 /100 WBC     POC Glucose Once [[id number]]  (Abnormal) Collected: 02/23/23 1132    Specimen: Blood Updated: 02/23/23 1135     Glucose 230 mg/dL      Comment: Meter: [id number] : [id number] Ethel Mack STARR             Imaging Results (Last 24 Hours)     ** No results found for the last 24 hours. **          Results for orders placed during the hospital encounter of 02/22/23    Adult Transthoracic Echo Complete w/ Color, Spectral and Contrast if Necessary Per Protocol    Interpretation Summary  •  Left ventricular systolic function is normal. Left ventricular ejection fraction appears to be 56 - 60%.  •  Left ventricular diastolic function was normal.  •  Estimated right ventricular systolic pressure from tricuspid regurgitation is normal (<35 mmHg).      ECG 12 Lead Chest Pain   Final Result   HEART RATE= 66  bpm   RR Interval= 909  ms   MT Interval= 184  ms   P Horizontal Axis= 18  deg   P Front Axis= 48  deg   QRSD Interval= 114  ms   QT Interval= 440  ms   QRS Axis= 72  deg   T Wave Axis= 29  deg   - OTHERWISE NORMAL ECG -   Sinus rhythm   Borderline intraventricular conduction delay   No Prior Tracing for Comparison   Electronically Signed By: Rc Gama ([facility]) 22-Feb-2023 11:56:41   Date and Time of Study: 2023-02-22 00:44:22            Current Facility-Administered Medications:   •  acetaminophen (TYLENOL) tablet 650 mg, 650 mg, Oral, Q4H PRN **OR** acetaminophen (TYLENOL) 160 MG/5ML solution 650 mg, 650 mg, Oral, Q4H PRN **OR** acetaminophen (TYLENOL) suppository 650 mg, 650 mg, Rectal, Q4H PRN, Leonela Zelaya APRN  •  allopurinol (ZYLOPRIM) tablet 100 mg, 100 mg, Oral, Daily, Anthony Drake APRN, 100 mg at 02/23/23 1004  •  amLODIPine (NORVASC) tablet 10 mg, 10 mg, Oral, Daily, Anthony Drake APRN, 10 mg at 02/23/23 1004  •  atorvastatin (LIPITOR) tablet 10 mg, 10 mg, Oral, Daily, Anthony Drake APRN, 10 mg at 02/23/23 1004  •  calcium carbonate (TUMS) chewable tablet  500 mg (200 mg elemental), 2 tablet, Oral, BID PRN, Leonela Zelaya APRN  •  chlorthalidone (HYGROTON) tablet 25 mg, 25 mg, Oral, Daily, Anthony Drake APRN, 25 mg at 02/23/23 1004  •  clopidogrel (PLAVIX) tablet 75 mg, 75 mg, Oral, Daily, Anthony Drake APRN, 75 mg at 02/23/23 1004  •  dextrose (D50W) (25 g/50 mL) IV injection 25 g, 25 g, Intravenous, Q15 Min PRN, Leonela Zelaya APRN  •  dextrose (GLUTOSE) oral gel 15 g, 15 g, Oral, Q15 Min PRN, Leonela Zelaya APRN  •  gabapentin (NEURONTIN) capsule 800 mg, 800 mg, Oral, TID PRN, Anthony Drake APRN, 800 mg at 02/22/23 2035  •  glucagon (GLUCAGEN) injection 1 mg, 1 mg, Subcutaneous, PRN, Leonela Zelaya APRN  •  HYDROcodone-acetaminophen (NORCO) 7.5-325 MG per tablet 1 tablet, 1 tablet, Oral, Q8H PRN, Anthony Drake APRN  •  insulin glargine (LANTUS, SEMGLEE) injection 30 Units, 30 Units, Subcutaneous, Q12H, Anthony Drake APRN, 30 Units at 02/23/23 1029  •  insulin lispro (ADMELOG) injection 0-9 Units, 0-9 Units, Subcutaneous, 4x Daily With Meals & Nightly, Leonela Zelaya APRN, 4 Units at 02/23/23 1206  •  insulin lispro (ADMELOG) injection 3 Units, 3 Units, Subcutaneous, TID With Meals, Anthony Drake APRN, 3 Units at 02/23/23 1207  •  isosorbide mononitrate (IMDUR) 24 hr tablet 120 mg, 120 mg, Oral, Daily, Anthony Drake APRN, 120 mg at 02/23/23 1004  •  levETIRAcetam (KEPPRA) tablet 500 mg, 500 mg, Oral, BID, Anthony Drake APRN, 500 mg at 02/23/23 1004  •  losartan (COZAAR) tablet 100 mg, 100 mg, Oral, Daily, Anthony Drake APRN, 100 mg at 02/23/23 1004  •  metoprolol tartrate (LOPRESSOR) tablet 50 mg, 50 mg, Oral, BID, Anthony Drake APRN, 50 mg at 02/23/23 1004  •  montelukast (SINGULAIR) tablet 10 mg, 10 mg, Oral, Nightly, Anthony Drake APRN, 10 mg at 02/22/23 2025  •  nitroglycerin (NITROSTAT) SL tablet 0.4 mg, 0.4 mg, Sublingual, Q5 Min PRN, Leonela Zelaya APRN  •  ondansetron (ZOFRAN) tablet  4 mg, 4 mg, Oral, Q6H PRN **OR** ondansetron (ZOFRAN) injection 4 mg, 4 mg, Intravenous, Q6H PRN, Leonela Zelaya APRN  •  pantoprazole (PROTONIX) EC tablet 40 mg, 40 mg, Oral, Q AM, Anthony Drake APRN, 40 mg at 02/23/23 0630  •  [COMPLETED] Insert Peripheral IV, , , Once **AND** sodium chloride 0.9 % flush 10 mL, 10 mL, Intravenous, PRN, James Thomas III, PA  •  sodium chloride 0.9 % flush 10 mL, 10 mL, Intravenous, Q12H, Leonela Zelaya APRN, 10 mL at 02/23/23 1005  •  sodium chloride 0.9 % flush 10 mL, 10 mL, Intravenous, PRN, Leonela Zelaya APRN  •  sodium chloride 0.9 % infusion 40 mL, 40 mL, Intravenous, PRN, Leonela Zelaya APRN  •  traZODone (DESYREL) tablet 100 mg, 100 mg, Oral, Nightly, Anthony Drake APRN, 100 mg at 02/22/23 2210     2D echo showed ejection fraction 60%  Stress Cardiolite showed small area of ischemia in the inferior wall    Assessment/Plan   Atypical chest pain  Insulin-dependent diabetes mellitus  Hypertension  Hyperlipidemia  Chronic anemia  Chronic kidney disease stage II   Gastroesophageal reflux disease    Discharge home  Discharge summary dictated    Atilio Melendrez MD  02/23/23  12:46 EST

## 2023-02-23 NOTE — PAYOR COMM NOTE
"Eric Simms (63 y.o. Male)       ATTN: REQUESTING OBSERVATION AUTHORIZATION  ID# [id number]    PLEASE REPLY TO UR DEPT: [phone]  [phone]    [facility]: NPI [id number]  [facility]# [id number]        Date of Birth   1960    Social Security Number       Address   [address] [place] [address]    Home Phone   [phone]    MRN   [id number]       Yarsanism   Taoism    Marital Status   Single                            Admission Date   2/22/23  OBSERVATION    Admission Type   Emergency    Admitting Provider   Theodore Cormier MD    Attending Provider   Atilio Melendrez MD    Department, Room/Bed   [facility] OBSERVATION, 124/1       Discharge Date       Discharge Disposition       Discharge Destination                               Attending Provider: Atilio Melendrez MD    Allergies: No Known Allergies    Isolation: None   Infection: None   Code Status: CPR    Ht: 165.1 cm (65\")   Wt: 97.1 kg (214 lb)    Admission Cmt: None   Principal Problem: Chest pain, unspecified type [R07.9]                 Active Insurance as of 2/22/2023     Primary Coverage     Payor Plan Insurance Group Employer/Plan Group    [facility] MEDICAID N     Payor Plan Address Payor Plan Phone Number Payor Plan Fax Number Effective Dates    [address] [address] [phone]  2/22/2023 - None Entered    [facility] [id number]       Subscriber Name Subscriber Birth Date Member ID       ERIC SIMMS 1960 [id number]                 Emergency Contacts      (Rel.) Home Phone Work Phone Mobile Phone    DALIA MACHADO [phone] -- [phone]    Bonnie Pozo (Daughter) -- -- [phone]               History & Physical      Anthony Drake APRN at 02/22/23 0932     Attestation signed by Theodore Cormier MD at 02/22/23 5182    I have reviewed this documentation and agree. This is a 63-year-old male, with history of diabetes, hypertension, hyperlipidemia, GERD, peripheral " neuropathy, presents to the hospital with chest pain, cardiology evaluation was requested, plans for stress test have been noted.  Patient also has type 2 diabetes mellitus, A1c noted to be 12.3, indicating poor glycemic control.  Home Lantus regimen will be continued along with sliding scale insulin coverage.  Patient also has history of hypertension, continue home medications.  Further plans will based on the hospital course.    Assessment and plan  1.  Chest pain, cardiology evaluation, Plavix, statin and Imdur continued for now.    2.  Type 2 diabetes mellitus, continue home dose of Lantus, has poor glycemic control, continue Accu-Cheks and sliding scale insulin coverage.    3.  Anemia of chronic disease, monitor labs.    4.  CODE STATUS is full code.  Further plans based on hospital course.                    Patient Name:  Eric Simms  YOB: 1960  MRN:  [id number]  Admit Date:  2/22/2023  Patient Care Team:  Erlin Madrigal MD as PCP - General (Family Medicine)      Subjective    History Present Illness     Chief Complaint   Patient presents with   • Chest Pain       Mr. Simms is a 63 y.o. former smoker with a history of diabetes mellitus type 2, hypertension, hyperlipidemia, GERD, peripheral neuropathy, seizures, CVA that presents to [facility] complaining of chest pain.    Plan for EGD with dilatation at [place] at end of January 2023 & instead went to ER for elevated blood sugar.      Patient states intermittent, alternating chest pain since end of December 2022 & previously evaluated at [facility]; however, limited historian regarding medical history.  Previously evaluated by Dr. Johnson.     Watching a basketball game last night on 2/21/2023 & developed intractable chest pain described as paralyzing; therefore, family notified EMS.    Took nitro x1 with slight relief.    Recommendation pending hospital course.  Details below.    History of Present  Illness  Review of Systems   Constitutional: Negative for chills and fever.   HENT: Negative for congestion and rhinorrhea.    Respiratory: Negative for cough and shortness of breath.    Cardiovascular: Positive for chest pain. Negative for leg swelling.   Gastrointestinal: Negative for abdominal pain, constipation, diarrhea, nausea and vomiting.   Endocrine: Negative for polydipsia, polyphagia and polyuria.   Genitourinary: Negative for difficulty urinating and dysuria.   Musculoskeletal: Positive for gait problem (due to generalized weakness). Negative for myalgias.   Skin: Negative for rash and wound.   Neurological: Positive for dizziness. Negative for syncope and light-headedness.   Psychiatric/Behavioral: Negative for confusion and hallucinations.        Personal History     Past Medical History:   Diagnosis Date   • Coronary artery disease    • Diabetes mellitus (HCC)    • GERD (gastroesophageal reflux disease)    • Hyperlipidemia    • Hypertension    • Peripheral neuropathy    • Seizures (HCC)    • Stroke (HCC)      History reviewed. No pertinent surgical history.  History reviewed. No pertinent family history.  Social History     Tobacco Use   • Smoking status: Never   • Smokeless tobacco: Never   Vaping Use   • Vaping Use: Never used   Substance Use Topics   • Alcohol use: Not Currently   • Drug use: Not Currently     Comment: crack - last use 15 years ago     No current facility-administered medications on file prior to encounter.     Current Outpatient Medications on File Prior to Encounter   Medication Sig Dispense Refill   • allopurinol (ZYLOPRIM) 100 MG tablet Take 100 mg by mouth Daily.     • amLODIPine (NORVASC) 10 MG tablet Take 10 mg by mouth Daily.     • atorvastatin (LIPITOR) 10 MG tablet Take 10 mg by mouth Daily.     • chlorthalidone (HYGROTON) 25 MG tablet Take 25 mg by mouth Daily.     • clopidogrel (PLAVIX) 75 MG tablet Take 75 mg by mouth Daily.     • gabapentin (NEURONTIN) 400 MG capsule  Take 800 mg by mouth 3 (Three) Times a Day As Needed.     • HYDROcodone-acetaminophen (NORCO) 7.5-325 MG per tablet Take 1 tablet by mouth Every 8 (Eight) Hours As Needed for Moderate Pain.     • insulin glargine (LANTUS, SEMGLEE) 100 UNIT/ML injection Inject 75 Units under the skin into the appropriate area as directed Every Night.     • insulin glargine (LANTUS, SEMGLEE) 100 UNIT/ML injection Inject 15 Units under the skin into the appropriate area as directed 2 (Two) Times a Day.     • Insulin Lispro (humaLOG) 100 UNIT/ML injection Inject 10 Units under the skin into the appropriate area as directed 3 (Three) Times a Day Before Meals.     • isosorbide mononitrate (IMDUR) 120 MG 24 hr tablet Take 120 mg by mouth Daily.     • levETIRAcetam (KEPPRA) 500 MG tablet Take 500 mg by mouth 2 (Two) Times a Day.     • losartan (COZAAR) 50 MG tablet Take 100 mg by mouth Daily.     • metFORMIN (GLUCOPHAGE) 500 MG tablet Take 500 mg by mouth 2 (Two) Times a Day With Meals.     • metoclopramide (REGLAN) 5 MG tablet Take 5 mg by mouth 4 (Four) Times a Day Before Meals & at Bedtime.     • metoprolol tartrate (LOPRESSOR) 50 MG tablet Take 50 mg by mouth 2 (Two) Times a Day.     • montelukast (SINGULAIR) 10 MG tablet Take 10 mg by mouth Every Night.     • omeprazole (priLOSEC) 40 MG capsule Take 40 mg by mouth Daily.     • pantoprazole (PROTONIX) 20 MG EC tablet Take 20 mg by mouth Daily.     • traZODone (DESYREL) 100 MG tablet Take 100 mg by mouth Every Night.       No Known Allergies    Objective     Objective     Vital Signs  Temp:  [98.2 °F (36.8 °C)] 98.2 °F (36.8 °C)  Heart Rate:  [58-68] 67  Resp:  [16] 16  BP: (142-172)/(74-92) 152/74  SpO2:  [92 %-99 %] 99 %  on   ;   Device (Oxygen Therapy): room air  Body mass index is 35.61 kg/m².    Physical Exam  Constitutional:       General: He is not in acute distress.     Appearance: He is obese. He is not toxic-appearing.   HENT:      Head: Normocephalic and atraumatic.   Eyes:       Extraocular Movements: Extraocular movements intact.      Conjunctiva/sclera: Conjunctivae normal.   Cardiovascular:      Rate and Rhythm: Normal rate.      Heart sounds: Normal heart sounds.   Pulmonary:      Effort: Pulmonary effort is normal.      Breath sounds: Normal breath sounds.   Abdominal:      General: Bowel sounds are normal.      Palpations: Abdomen is soft.   Musculoskeletal:      Cervical back: Normal range of motion and neck supple.      Right lower leg: Edema (non-pitting edema, BLE) present.      Left lower leg: Edema present.   Skin:     General: Skin is warm and dry.   Neurological:      Mental Status: He is alert and oriented to person, place, and time.      Cranial Nerves: No cranial nerve deficit.         Results Review:  I reviewed the patient's new clinical results.  I reviewed the patient's new imaging results and agree with the interpretation.  I reviewed the patient's other test results and agree with the interpretation  I personally viewed and interpreted the patient's EKG/Telemetry data  Discussed with ED provider.    Lab Results (last 24 hours)     Procedure Component Value Units Date/Time    Magnesium [[id number]]  (Normal) Collected: 02/22/23 0115    Specimen: Blood from Arm, Right Updated: 02/22/23 0147     Magnesium 2.0 mg/dL     CBC & Differential [[id number]]  (Abnormal) Collected: 02/22/23 0223    Specimen: Blood from Hand, Right Updated: 02/22/23 0251    Narrative:      The following orders were created for panel order CBC & Differential.  Procedure                               Abnormality         Status                     ---------                               -----------         ------                     CBC Auto Differential[[id number]]        Abnormal            Final result                 Please view results for these tests on the individual orders.    Comprehensive Metabolic Panel [[id number]]  (Abnormal) Collected: 02/22/23 0223    Specimen: Blood from Hand, Right  Updated: 02/22/23 0303     Glucose 190 mg/dL      BUN 18 mg/dL      Creatinine 1.28 mg/dL      Sodium 135 mmol/L      Potassium 4.3 mmol/L      Chloride 101 mmol/L      CO2 25.9 mmol/L      Calcium 9.1 mg/dL      Total Protein 7.0 g/dL      Albumin 4.3 g/dL      ALT (SGPT) 18 U/L      AST (SGOT) 12 U/L      Alkaline Phosphatase 69 U/L      Total Bilirubin 0.3 mg/dL      Globulin 2.7 gm/dL      A/G Ratio 1.6 g/dL      BUN/Creatinine Ratio 14.1     Anion Gap 8.1 mmol/L      eGFR 62.9 mL/min/1.73     Narrative:      GFR Normal >60  Chronic Kidney Disease <60  Kidney Failure <15      High Sensitivity Troponin T [[id number]]  (Abnormal) Collected: 02/22/23 0223    Specimen: Blood from Hand, Right Updated: 02/22/23 0303     HS Troponin T 23 ng/L     Narrative:      High Sensitive Troponin T Reference Range:  <10.0 ng/L- Negative Female for AMI  <15.0 ng/L- Negative Male for AMI  >=10 - Abnormal Female indicating possible myocardial injury.  >=15 - Abnormal Male indicating possible myocardial injury.   Clinicians would have to utilize clinical acumen, EKG, Troponin, and serial changes to determine if it is an Acute Myocardial Infarction or myocardial injury due to an underlying chronic condition.         CBC Auto Differential [[id number]]  (Abnormal) Collected: 02/22/23 0223    Specimen: Blood from Hand, Right Updated: 02/22/23 0251     WBC 7.69 10*3/mm3      RBC 3.72 10*6/mm3      Hemoglobin 11.6 g/dL      Hematocrit 34.8 %      MCV 93.5 fL      MCH 31.2 pg      MCHC 33.3 g/dL      RDW 13.7 %      RDW-SD 46.7 fl      MPV 11.5 fL      Platelets 158 10*3/mm3      Neutrophil % 60.9 %      Lymphocyte % 23.8 %      Monocyte % 9.4 %      Eosinophil % 4.7 %      Basophil % 0.7 %      Neutrophils, Absolute 4.69 10*3/mm3      Lymphocytes, Absolute 1.83 10*3/mm3      Monocytes, Absolute 0.72 10*3/mm3      Eosinophils, Absolute 0.36 10*3/mm3      Basophils, Absolute 0.05 10*3/mm3     POC Glucose Once [[id number]]  (Abnormal) Collected:  02/22/23 0639    Specimen: Blood Updated: 02/22/23 0640     Glucose 134 mg/dL      Comment: Meter: [id number] : [id number] Morris CLEMENTS       High Sensitivity Troponin T 2Hr [[id number]]  (Abnormal) Collected: 02/22/23 0648    Specimen: Blood Updated: 02/22/23 0726     HS Troponin T 25 ng/L      Troponin T Delta 2 ng/L     Narrative:      High Sensitive Troponin T Reference Range:  <10.0 ng/L- Negative Female for AMI  <15.0 ng/L- Negative Male for AMI  >=10 - Abnormal Female indicating possible myocardial injury.  >=15 - Abnormal Male indicating possible myocardial injury.   Clinicians would have to utilize clinical acumen, EKG, Troponin, and serial changes to determine if it is an Acute Myocardial Infarction or myocardial injury due to an underlying chronic condition.         Basic Metabolic Panel [[id number]]  (Abnormal) Collected: 02/22/23 0648    Specimen: Blood Updated: 02/22/23 0726     Glucose 146 mg/dL      BUN 16 mg/dL      Creatinine 1.30 mg/dL      Sodium 141 mmol/L      Potassium 4.6 mmol/L      Chloride 106 mmol/L      CO2 25.4 mmol/L      Calcium 8.9 mg/dL      BUN/Creatinine Ratio 12.3     Anion Gap 9.6 mmol/L      eGFR 61.7 mL/min/1.73     Narrative:      GFR Normal >60  Chronic Kidney Disease <60  Kidney Failure <15      CBC (No Diff) [[id number]]  (Abnormal) Collected: 02/22/23 0648    Specimen: Blood Updated: 02/22/23 0701     WBC 7.45 10*3/mm3      RBC 3.82 10*6/mm3      Hemoglobin 11.9 g/dL      Hematocrit 35.5 %      MCV 92.9 fL      MCH 31.2 pg      MCHC 33.5 g/dL      RDW 13.8 %      RDW-SD 46.4 fl      MPV 11.3 fL      Platelets 175 10*3/mm3     Hemoglobin A1c [[id number]]  (Abnormal) Collected: 02/22/23 0648    Specimen: Blood Updated: 02/22/23 0711     Hemoglobin A1C 12.30 %     Narrative:      Hemoglobin A1C Ranges:    Increased Risk for Diabetes  5.7% to 6.4%  Diabetes                     >= 6.5%  Diabetic Goal                < 7.0%    POC Glucose Once [[id number]]  (Abnormal)  Collected: 02/22/23 1059    Specimen: Blood Updated: 02/22/23 1100     Glucose 272 mg/dL      Comment: Meter: [id number] : [id number] Franklyn Bruce PCA             Imaging Results (Last 24 Hours)     Procedure Component Value Units Date/Time    XR Chest 1 View [[id number]] Collected: 02/22/23 0129     Updated: 02/22/23 0133    Narrative:      SINGLE VIEW OF THE CHEST     HISTORY: Chest pain     COMPARISON: None available.     FINDINGS:  Cardiomegaly is present. There may be some vascular congestion. There is  calcification of the aorta. No pneumothorax, pleural effusion, or acute  infiltrate is seen.       Impression:      Cardiomegaly. Possible vascular congestion.     This report was finalized on 2/22/2023 1:30 AM by Dr. Kamala Carrasco M.D.                 ECG 12 Lead Chest Pain   Final Result   HEART RATE= 66  bpm   RR Interval= 909  ms   OH Interval= 184  ms   P Horizontal Axis= 18  deg   P Front Axis= 48  deg   QRSD Interval= 114  ms   QT Interval= 440  ms   QRS Axis= 72  deg   T Wave Axis= 29  deg   - OTHERWISE NORMAL ECG -   Sinus rhythm   Borderline intraventricular conduction delay   No Prior Tracing for Comparison   Electronically Signed By: Rc Gama ([facility]) 22-Feb-2023 11:56:41   Date and Time of Study: 2023-02-22 00:44:22          Assessment/Plan     Active Hospital Problems    Diagnosis  POA   • **Chest pain, unspecified type [R07.9]  Yes   • Type 2 diabetes mellitus with hyperglycemia (HCC) [E11.65]  Yes   • HTN (hypertension) [I10]  Yes   • Obesity (BMI 30-39.9) [E66.9]  Yes   • Anemia, chronic disease [D63.8]  Yes   • CKD (chronic kidney disease) stage 2, GFR 60-89 ml/min [N18.2]  Yes   • Seizure disorder (HCC) [G40.909]  Yes      Resolved Hospital Problems   No resolved problems to display.       Mr. Simms is a 63 y.o. former smoker with a history of diabetes mellitus type 2, hypertension, hyperlipidemia, GERD, peripheral neuropathy, seizures, CVA that [facility]  complaining of chest pain.      Chest pain, unspecified type: No ischemic changes on EKG.  TTE ordered.  Cardiology following plan Lexiscan stress in AM.  Plavix, statin, Imdur continued for now.      Type 2 diabetes mellitus with hyperglycemia (HCC): A1c 12.3 indicating poorly controlled diabetes in OP setting.  Provide Lantus 30 units subcu twice daily (reports home dose Lantus 75 units nightly) for now, scheduled Admelog 3 units subcu 3 times daily with meals, moderate dose correctional sliding scale 4 times daily.  Tolerating diet at present.  Monitor glucose trends.      HTN (hypertension): BP acceptable acutely.  Continue Norvasc 10 mg p.o. daily, chlorthalidone 25 mg p.o. daily, losartan 100 mg p.o. daily, metoprolol tartrate 50 milligram p.o. twice daily per home dose regimen.  Monitor blood pressure for changes.      Obesity (BMI 30-39.9): BMI 35.  Complicating patient problems.      Anemia, chronic disease: Serum hemoglobin stable compared to priors.  Ordering iron profile, ferritin, folate, vitamin B12.      CKD (chronic kidney disease) stage 2, GFR 60-89 ml/min: Serum creatinine appears at baseline.  Avoid nephrotoxins.  Monitor labs.      Seizure disorder (HCC): Reports last seizure activity approximately 8 years ago.  Keppra continued per home dose regimen.  Seizure precautions.      · I discussed the patient's findings and my recommendations with patient, RN, & Dr. Cormier.    *Discussed with Dr. Melendrez plans to take over care on 2/23/2023.    VTE Prophylaxis - SCDs.  Code Status - Full code.       BUNNY Sebastian  [facility]  02/22/23  12:50 EST      Electronically signed by Theodore Cormier MD at 02/22/23 1612          Emergency Department Notes      Earl Lawrence, RN at 02/22/23 0027        Pt arrived via [facility] c/o chest pain that started approx 90mins ago. Pt reports the chest pain to be a mid-sternal stabbing chest pain. Pt has hx of multiple MI and snow. Pt took 1  "sublingual nitro PTA with \"slight relief\"     Electronically signed by Earl Lawrence RN at 02/22/23 0029     James Thomas III, PA at 02/22/23 0037     Attestation signed by Milind Olmstead MD at 02/23/23 0301        SHARED APC FACE TO FACE: This visit was performed by both the physician and an APC. I personally evaluated and examined the patient. I performed the entirety of the physical exam and I documented this exam in this attestation or in accompanying documentation.    Milind Olmstead MD 2/23/2023 03:01 EST                          EMERGENCY DEPARTMENT ENCOUNTER    Room Number:  124/1  Date seen:  2/22/2023  PCP: Erlin Madrigal MD      HPI:  Chief Complaint: Chest pain    A complete HPI/ROS/PMH/PSH/SH/FH are unobtainable due to: Patient is a very poor historian    Context: Eric Simms is a 63 y.o. male who presents to the ED c/o chest pain that began approximately an hour and a half PTA.  Patient describes his pain as sharp and states it is on the left side of the chest.  It does not radiate.  Is not associated with nausea, vomiting, diaphoresis.  Patient does states she felt slightly short of breath during his bout of chest pain.  The pain has been constant since onset.  It did improve with sublingual nitro according to the patient and EMS.  At present he describes it as moderate.  He tells me the pain felt similar to previous heart attacks only more mild in nature.  He states his last MI was approximately 2 years ago.  He thinks he may have been seen at [facility].  He is unaware of who his cardiologist is.  He is unsure if he has stents.    He states he is uncontrolled diabetic.  Patient also informs me he recently had several strokes over the past few months.  He believes he was seen at [place] for at least one of the strokes.        PAST MEDICAL HISTORY  Active Ambulatory Problems     Diagnosis Date Noted   • No Active Ambulatory Problems     Resolved Ambulatory " Problems     Diagnosis Date Noted   • No Resolved Ambulatory Problems     Past Medical History:   Diagnosis Date   • Coronary artery disease    • Diabetes mellitus (HCC)    • GERD (gastroesophageal reflux disease)    • Hyperlipidemia    • Hypertension    • Peripheral neuropathy    • Seizures (HCC)    • Stroke (HCC)          PAST SURGICAL HISTORY  History reviewed. No pertinent surgical history.      FAMILY HISTORY  History reviewed. No pertinent family history.      SOCIAL HISTORY  Social History     Socioeconomic History   • Marital status: Single   Tobacco Use   • Smoking status: Never   • Smokeless tobacco: Never   Substance and Sexual Activity   • Alcohol use: Not Currently   • Drug use: Not Currently     Comment: crack - last use 15 years ago   • Sexual activity: Defer         ALLERGIES  Patient has no known allergies.        REVIEW OF SYSTEMS  Review of Systems     All systems reviewed and negative except for those discussed in HPI.       PHYSICAL EXAM  ED Triage Vitals [02/22/23 0030]   Temp Heart Rate Resp BP SpO2   98.2 °F (36.8 °C) 68 16 153/92 98 %      Temp src Heart Rate Source Patient Position BP Location FiO2 (%)   Oral Monitor Sitting Right arm --       Physical Exam      GENERAL: Very pleasant, nontoxic  HENT: nares patent  EYES: no scleral icterus  CV: regular rhythm, normal rate, normal S1-S2, no obvious unilateral leg swelling or pedal edema  RESPIRATORY: normal effort, lungs CTA  ABDOMEN: soft, nontender  MUSCULOSKELETAL: no deformity  NEURO: alert, moves all extremities, follows commands  PSYCH:  calm, cooperative  SKIN: warm, dry    Vital signs and nursing notes reviewed.          LAB RESULTS  Recent Results (from the past 24 hour(s))   ECG 12 Lead Chest Pain    Collection Time: 02/22/23 12:44 AM   Result Value Ref Range    QT Interval 440 ms   Magnesium    Collection Time: 02/22/23  1:15 AM    Specimen: Arm, Right; Blood   Result Value Ref Range    Magnesium 2.0 1.6 - 2.4 mg/dL    Comprehensive Metabolic Panel    Collection Time: 02/22/23  2:23 AM    Specimen: Hand, Right; Blood   Result Value Ref Range    Glucose 190 (H) 65 - 99 mg/dL    BUN 18 8 - 23 mg/dL    Creatinine 1.28 (H) 0.76 - 1.27 mg/dL    Sodium 135 (L) 136 - 145 mmol/L    Potassium 4.3 3.5 - 5.2 mmol/L    Chloride 101 98 - 107 mmol/L    CO2 25.9 22.0 - 29.0 mmol/L    Calcium 9.1 8.6 - 10.5 mg/dL    Total Protein 7.0 6.0 - 8.5 g/dL    Albumin 4.3 3.5 - 5.2 g/dL    ALT (SGPT) 18 1 - 41 U/L    AST (SGOT) 12 1 - 40 U/L    Alkaline Phosphatase 69 39 - 117 U/L    Total Bilirubin 0.3 0.0 - 1.2 mg/dL    Globulin 2.7 gm/dL    A/G Ratio 1.6 g/dL    BUN/Creatinine Ratio 14.1 7.0 - 25.0    Anion Gap 8.1 5.0 - 15.0 mmol/L    eGFR 62.9 >60.0 mL/min/1.73   High Sensitivity Troponin T    Collection Time: 02/22/23  2:23 AM    Specimen: Hand, Right; Blood   Result Value Ref Range    HS Troponin T 23 (H) <15 ng/L   CBC Auto Differential    Collection Time: 02/22/23  2:23 AM    Specimen: Hand, Right; Blood   Result Value Ref Range    WBC 7.69 3.40 - 10.80 10*3/mm3    RBC 3.72 (L) 4.14 - 5.80 10*6/mm3    Hemoglobin 11.6 (L) 13.0 - 17.7 g/dL    Hematocrit 34.8 (L) 37.5 - 51.0 %    MCV 93.5 79.0 - 97.0 fL    MCH 31.2 26.6 - 33.0 pg    MCHC 33.3 31.5 - 35.7 g/dL    RDW 13.7 12.3 - 15.4 %    RDW-SD 46.7 37.0 - 54.0 fl    MPV 11.5 6.0 - 12.0 fL    Platelets 158 140 - 450 10*3/mm3    Neutrophil % 60.9 42.7 - 76.0 %    Lymphocyte % 23.8 19.6 - 45.3 %    Monocyte % 9.4 5.0 - 12.0 %    Eosinophil % 4.7 0.3 - 6.2 %    Basophil % 0.7 0.0 - 1.5 %    Neutrophils, Absolute 4.69 1.70 - 7.00 10*3/mm3    Lymphocytes, Absolute 1.83 0.70 - 3.10 10*3/mm3    Monocytes, Absolute 0.72 0.10 - 0.90 10*3/mm3    Eosinophils, Absolute 0.36 0.00 - 0.40 10*3/mm3    Basophils, Absolute 0.05 0.00 - 0.20 10*3/mm3   POC Glucose Once    Collection Time: 02/22/23  6:39 AM    Specimen: Blood   Result Value Ref Range    Glucose 134 (H) 70 - 130 mg/dL   CBC (No Diff)    Collection  Time: 02/22/23  6:48 AM    Specimen: Blood   Result Value Ref Range    WBC 7.45 3.40 - 10.80 10*3/mm3    RBC 3.82 (L) 4.14 - 5.80 10*6/mm3    Hemoglobin 11.9 (L) 13.0 - 17.7 g/dL    Hematocrit 35.5 (L) 37.5 - 51.0 %    MCV 92.9 79.0 - 97.0 fL    MCH 31.2 26.6 - 33.0 pg    MCHC 33.5 31.5 - 35.7 g/dL    RDW 13.8 12.3 - 15.4 %    RDW-SD 46.4 37.0 - 54.0 fl    MPV 11.3 6.0 - 12.0 fL    Platelets 175 140 - 450 10*3/mm3       Ordered the above labs and reviewed the results.        RADIOLOGY  XR Chest 1 View    Result Date: 2/22/2023  SINGLE VIEW OF THE CHEST  HISTORY: Chest pain  COMPARISON: None available.  FINDINGS: Cardiomegaly is present. There may be some vascular congestion. There is calcification of the aorta. No pneumothorax, pleural effusion, or acute infiltrate is seen.      Cardiomegaly. Possible vascular congestion.  This report was finalized on 2/22/2023 1:30 AM by Dr. Kamala Carrasco M.D.        Ordered the above noted radiological studies. Reviewed by me in PACS.          PROCEDURES  Procedures          MEDICATIONS GIVEN IN ER  Medications   sodium chloride 0.9 % flush 10 mL (has no administration in time range)   sodium chloride 0.9 % flush 10 mL (has no administration in time range)   sodium chloride 0.9 % flush 10 mL (has no administration in time range)   sodium chloride 0.9 % infusion 40 mL (has no administration in time range)   nitroglycerin (NITROSTAT) SL tablet 0.4 mg (has no administration in time range)   acetaminophen (TYLENOL) tablet 650 mg (has no administration in time range)     Or   acetaminophen (TYLENOL) 160 MG/5ML solution 650 mg (has no administration in time range)     Or   acetaminophen (TYLENOL) suppository 650 mg (has no administration in time range)   ondansetron (ZOFRAN) tablet 4 mg (has no administration in time range)     Or   ondansetron (ZOFRAN) injection 4 mg (has no administration in time range)   calcium carbonate (TUMS) chewable tablet 500 mg (200 mg elemental) (has no  administration in time range)   dextrose (GLUTOSE) oral gel 15 g (has no administration in time range)   dextrose (D50W) (25 g/50 mL) IV injection 25 g (has no administration in time range)   glucagon (GLUCAGEN) injection 1 mg (has no administration in time range)   insulin lispro (ADMELOG) injection 0-9 Units (has no administration in time range)   morphine injection 4 mg (4 mg Intravenous Given 2/22/23 0220)   ondansetron (ZOFRAN) injection 4 mg (4 mg Intravenous Given 2/22/23 0220)         MEDICAL DECISION MAKING, PROGRESS, and CONSULTS    Discussion below represents my analysis of pertinent findings related to patient's condition, differential diagnosis, treatment plan and final disposition.      Orders placed during this visit:  Orders Placed This Encounter   Procedures   • XR Chest 1 View   • Comprehensive Metabolic Panel   • High Sensitivity Troponin T   • Magnesium   • CBC Auto Differential   • High Sensitivity Troponin T 2Hr   • Potassium   • Magnesium   • High Sensitivity Troponin T   • Blood Gas, Arterial -   • Basic Metabolic Panel   • CBC (No Diff)   • Hemoglobin A1c   • Diet: Cardiac Diets; Healthy Heart (2-3 Na+); Texture: Regular Texture (IDDSI 7); Fluid Consistency: Thin (IDDSI 0)   • Pulse Oximetry, Continuous   • Vital Signs   • Intake & Output   • Weigh Patient   • Oral Care   • Place Sequential Compression Device   • Maintain Sequential Compression Device   • Telemetry - Maintain IV Access   • Continuous Cardiac Monitoring   • Telemetry - Pulse Oximetry   • May Be Off Telemetry for Tests   • Do NOT Hold Basal or Correction Scale Insulin When Patient is NPO, Hold Scheduled Mealtime (Bolus) Insulin if NPO   • Code Status and Medical Interventions:   • L[facility] (on-call MD unless specified) Details   • Inpatient Cardiology Consult   • Oxygen Therapy- Nasal Cannula; Titrate for SPO2: 90% - 95%   • Oxygen Therapy- Nasal Cannula; Titrate for SPO2: 90% - 95%   • POC Glucose TID AC   • POC Glucose Once   •  ECG 12 Lead Chest Pain   • ECG 12 Lead Chest Pain   • Insert Peripheral IV   • Insert Peripheral IV   • Initiate Observation Status   • CBC & Differential         Additional sources:  - Discussed/obtained information from independent historians: EMS augments history  Additional information was obtained to confirm the patient's history.    - External (non-ED) record review: There are no prior records in epic at [facility] to review.    - Chronic or social conditions impacting care: Patient is an incredibly poor historian and lives at home alone.  He states he does not have transportation to follow-up with any of his doctors.  He is unable to tell me who his cardiologist is at this time.        Differential diagnosis:    ACS, PTX, PE, PNA, aortic dissection, chest wall pain, GI mediated chest pain, pericarditis, cardiac tamponade, pleurisy.  Chest wall pain.        Independent interpretation of labs, radiology studies, and discussions with consultants:  ED Course as of 02/22/23 0704   Wed Feb 22, 2023   0121 XR Chest 1 View  My independent interpretation of the chest x-ray is cardiomegaly and questionable vascular congestion.  Radiologist confirms cardiomegaly and vascular congestion. [RC]   0159 Magnesium: 2.0 [RC]   0414 WBC: 7.69 [RC]   0414 RBC(!): 3.72 [RC]   0414 Hemoglobin(!): 11.6 [RC]   0414 Hematocrit(!): 34.8 [RC]   0414 Platelets: 158 [RC]   0414 Glucose(!): 190 [RC]   0414 BUN: 18 [RC]   0414 Creatinine(!): 1.28 [RC]   0414 Sodium(!): 135 [RC]   0414 Potassium: 4.3 [RC]   0414 CO2: 25.9 [RC]   0414 Anion Gap: 8.1 [RC]   0414 Magnesium: 2.0 [RC]   0414 EKG          EKG time: 12:44 AM  Rhythm/Rate: 64/sinus  P waves and SC: Normal SC interval  QRS, axis: Normal axis  ST and T waves: No acute ST changes noted.  Nonspecific T wave changes inferior leads.    Interpreted Contemporaneously by me, independently viewed  -No prior EKG to compare [RC]   0421 HEART SCORE    History Moderately  suspicious (1)  ECG Nonspecific repol disturbance (1)  Age 46-65 (1)  Risk factors > or = to 3 RF for atherosclerotic dx (2)  Troponin < or = Normal limit (0)    This patient's HEART score is 5    HEART Score Key:  Scores 0-3: 0.9-1.7% risk of adverse cardiac event. In the HEART Score study, these patients were discharged (0.99% in the retrospective study, 1.7% in the prospective study)  Scores 4-6: 12-16.6% risk of adverse cardiac event. In the HEART Score study, these patients were admitted to the hospital. (11.6% retrospective, 16.6% prospective)  Scores ?7: 50-65% risk of adverse cardiac event. In the HEART Score study, these patients were candidates for early invasive measures. (65.2% retrospective, 50.1% prospective)    [RC]   0594 Patient's heart score is a 5.  Although his chest pain is somewhat atypical and sharp presentation.  Patient does have a past medical history of heart attacks and states his pain felt similar to previous heart attack tonight but not as severe.  He is a very poor historian, poorly controlled diabetic, and has no transportation and a hard time following up with any of his physicians.  Taking all the above into account, it is felt to be better served by admission to the hospital at this time for further evaluation.  We will place a call to the hospitalist [RC]   1461 Discussed the patient's case with BUNNY Neville with [facility].  To admit to Dr. Bruce's care in a telemetry bed. [RC]   8070 Discovered patient should have been admitted to Dr. Atilio Melendrez.  Called and discussed with Leonela Zelaya.  To leave as is for now and they will transfer later to Dr. Melendrez's care.  I updated Dr. Melendrez on the patient's case and he is aware and will manage further. [RC]      ED Course User Index  [RC] James Thomas III, PA                  PPE: The patient wore a mask throughout the entire encounter. I wore a well-fitting mask.    DIAGNOSIS  Final diagnoses:   Chest pain, unspecified  type   Hyperglycemia   Poorly controlled diabetes mellitus (HCC)         DISPOSITION  ADMISSION    Discussed treatment plan and reason for admission with pt/family and admitting physician.  Pt/family voiced understanding of the plan for admission for further testing/treatment as needed.         Latest Documented Vital Signs:  As of 07:04 EST  BP- 172/90 HR- 67 Temp- 98.2 °F (36.8 °C) (Oral) O2 sat- 99%      --    Please note that portions of this were completed with a voice recognition program.       Note Disclaimer: At [facility], we believe that sharing information builds trust and better relationships. You are receiving this note because you are receiving care at [facility] or recently visited. It is possible you will see health information before a provider has talked with you about it. This kind of information can be easy to misunderstand. To help you fully understand what it means for your health, we urge you to discuss this note with your provider.       James Thomas III, PA  02/22/23 0704      Electronically signed by Milind Olmstead MD at 02/23/23 0301     Mone Lui, RN at 02/22/23 0043        Pt wearing face mask, hospital personnel in appropriate ppe.       Electronically signed by Mone Lui RN at 02/22/23 0043     Milind Olmstead MD at 02/22/23 0125          MD ATTESTATION NOTE    The TEJAL and I have discussed this patient's history, physical exam, and treatment plan.  I have reviewed the documentation and personally had a face to face interaction with the patient. I affirm the documentation and agree with the treatment and plan.  The attached note describes my personal findings.    I provided a substantive portion of the care of this patient. I personally performed the physical exam, in its entirety.    Independent Historians: Patient, EMS    A complete HPI/ROS/PMH/PSH/SH/FH are unobtainable due to: Nothing    Chronic or social conditions impacting patient care  (social determinants of health): None    Eric Simms is a 63 y.o. male who presents to the ED c/o left-sided chest pain that started about an hour and a half prior to arrival.  He states it is sharp.  He states he has had similar episodes in the past.  It does not radiate.  Nothing makes it worse or better.  He reports a history of heart attacks.  He was given nitroglycerin by EMS.      Review of prior external notes (non-ED): He had an endocrinology note dated 2/7/2023 for follow-up of type 2 diabetes.  They provided counseling.    Review of prior external test results outside of this encounter: CBC dated 1/26/2023 was normal.  CMP the same date showed elevated glucose of 468.  Creatinine was 1.5    On exam:  GENERAL: Awake, alert, no acute distress  SKIN: Warm, dry  HENT: Normocephalic, atraumatic  EYES: no scleral icterus  CV: regular rhythm, regular rate  RESPIRATORY: normal effort, lungs clear  ABDOMEN: soft, nontender, nondistended  MUSCULOSKELETAL: no deformity, no calf tenderness or swelling  NEURO: alert, moves all extremities, follows commands    Labs  Recent Results (from the past 24 hour(s))   POC Glucose Once    Collection Time: 02/22/23  6:39 AM    Specimen: Blood   Result Value Ref Range    Glucose 134 (H) 70 - 130 mg/dL   High Sensitivity Troponin T 2Hr    Collection Time: 02/22/23  6:48 AM    Specimen: Blood   Result Value Ref Range    HS Troponin T 25 (H) <15 ng/L    Troponin T Delta 2 >=-4 - <+4 ng/L   Basic Metabolic Panel    Collection Time: 02/22/23  6:48 AM    Specimen: Blood   Result Value Ref Range    Glucose 146 (H) 65 - 99 mg/dL    BUN 16 8 - 23 mg/dL    Creatinine 1.30 (H) 0.76 - 1.27 mg/dL    Sodium 141 136 - 145 mmol/L    Potassium 4.6 3.5 - 5.2 mmol/L    Chloride 106 98 - 107 mmol/L    CO2 25.4 22.0 - 29.0 mmol/L    Calcium 8.9 8.6 - 10.5 mg/dL    BUN/Creatinine Ratio 12.3 7.0 - 25.0    Anion Gap 9.6 5.0 - 15.0 mmol/L    eGFR 61.7 >60.0 mL/min/1.73   CBC (No Diff)    Collection Time:  02/22/23  6:48 AM    Specimen: Blood   Result Value Ref Range    WBC 7.45 3.40 - 10.80 10*3/mm3    RBC 3.82 (L) 4.14 - 5.80 10*6/mm3    Hemoglobin 11.9 (L) 13.0 - 17.7 g/dL    Hematocrit 35.5 (L) 37.5 - 51.0 %    MCV 92.9 79.0 - 97.0 fL    MCH 31.2 26.6 - 33.0 pg    MCHC 33.5 31.5 - 35.7 g/dL    RDW 13.8 12.3 - 15.4 %    RDW-SD 46.4 37.0 - 54.0 fl    MPV 11.3 6.0 - 12.0 fL    Platelets 175 140 - 450 10*3/mm3   Hemoglobin A1c    Collection Time: 02/22/23  6:48 AM    Specimen: Blood   Result Value Ref Range    Hemoglobin A1C 12.30 (H) 4.80 - 5.60 %   POC Glucose Once    Collection Time: 02/22/23 10:59 AM    Specimen: Blood   Result Value Ref Range    Glucose 272 (H) 70 - 130 mg/dL   Adult Transthoracic Echo Complete w/ Color, Spectral and Contrast if Necessary Per Protocol    Collection Time: 02/22/23 11:42 AM   Result Value Ref Range    Target HR (85%) 133 bpm    Max. Pred. HR (100%) 157 bpm    BH CV VAS BP RIGHT /74 mmHg    EF(MOD-bp) 56.3 %    LVIDd 5.0 cm    LVIDs 3.3 cm    IVSd 1.06 cm    LVPWd 0.96 cm    FS 33.5 %    IVS/LVPW 1.09 cm    ESV(cubed) 36.8 ml    EDV(cubed) 125.0 ml    LVOT area 4.1 cm2    LV mass(C)d 184.5 grams    LVOT diam 2.28 cm    EDV(MOD-sp2) 108.0 ml    EDV(MOD-sp4) 135.0 ml    ESV(MOD-sp2) 49.0 ml    ESV(MOD-sp4) 57.0 ml    SV(MOD-sp2) 59.0 ml    SV(MOD-sp4) 78.0 ml    EF(MOD-sp2) 54.6 %    EF(MOD-sp4) 57.8 %    MV E max jose guadalupe 95.1 cm/sec    MV A max jose guadalupe 89.1 cm/sec    MV dec time 145 msec    MV E/A 1.07     Pulm A Revs Dur 0.13 sec    MV A dur 0.14 sec    LA ESV Index (BP) 46.0 ml/m2    Med Peak E' Jose Guadalupe 8.4 cm/sec    Lat Peak E' Jose Guadalupe 9.6 cm/sec    Avg E/e' ratio 10.57     SV(LVOT) 98.0 ml    SV(RVOT) 40.4 ml    Qp/Qs 0.41     RV Base 3.7 cm    RV Mid 3.4 cm    RV Length 7.0 cm    RV S' 9.4 cm/sec    Pulm Sys Jose Guadalupe 73.1 cm/sec    Pulm Bauman Jose Gaudalupe 49.6 cm/sec    Pulm S/D 1.47     Pulm A Revs Jose Guadalupe 33.7 cm/sec    LV V1 max 102.4 cm/sec    LV V1 max PG 4.2 mmHg    LV V1 mean PG 2.00 mmHg    LV V1  VTI 24.1 cm    Ao pk yesenia 130.3 cm/sec    Ao max PG 6.8 mmHg    Ao mean PG 3.4 mmHg    Ao V2 VTI 28.9 cm    PANTERA(I,D) 3.4 cm2    MV max PG 5.4 mmHg    MV mean PG 2.22 mmHg    MV V2 VTI 32.4 cm    MV P1/2t 81.2 msec    MVA(P1/2t) 2.7 cm2    MVA(VTI) 3.0 cm2    MV dec slope 437.3 cm/sec2    TR max yesenia 222.0 cm/sec    TR max PG 19.7 mmHg    RVSP(TR) 22.7 mmHg    RAP systole 3.0 mmHg    RVOT diam 1.83 cm    RV V1 max PG 1.70 mmHg    RV V1 max 65.1 cm/sec    RV V1 VTI 15.4 cm    PA V2 max 107.2 cm/sec    PA acc time 0.12 sec    PA pr(Accel) 25.5 mmHg    Ao root diam 3.6 cm    ACS 2.35 cm    STJ 3.2 cm    TAPSE (>1.6) 2.00 cm    Dimensionless Index 0.80 (DI)    Ascending aorta 3.4 cm   POC Glucose Once    Collection Time: 02/22/23  4:27 PM    Specimen: Blood   Result Value Ref Range    Glucose 229 (H) 70 - 130 mg/dL   POC Glucose Once    Collection Time: 02/22/23  8:24 PM    Specimen: Blood   Result Value Ref Range    Glucose 297 (H) 70 - 130 mg/dL       Radiology  No Radiology Exams Resulted Within Past 24 Hours    Medical Decision Making:  ED Course as of 02/23/23 0303   Wed Feb 22, 2023   0121 XR Chest 1 View  My independent interpretation of the chest x-ray is cardiomegaly and questionable vascular congestion.  Radiologist confirms cardiomegaly and vascular congestion. [RC]   0159 Magnesium: 2.0 [RC]   0414 WBC: 7.69 [RC]   0414 RBC(!): 3.72 [RC]   0414 Hemoglobin(!): 11.6 [RC]   0414 Hematocrit(!): 34.8 [RC]   0414 Platelets: 158 [RC]   0414 Glucose(!): 190 [RC]   0414 BUN: 18 [RC]   0414 Creatinine(!): 1.28 [RC]   0414 Sodium(!): 135 [RC]   0414 Potassium: 4.3 [RC]   0414 CO2: 25.9 [RC]   0414 Anion Gap: 8.1 [RC]   0414 Magnesium: 2.0 [RC]   0414 EKG          EKG time: 12:44 AM  Rhythm/Rate: 64/sinus  P waves and NJ: Normal NJ interval  QRS, axis: Normal axis  ST and T waves: No acute ST changes noted.  Nonspecific T wave changes inferior leads.    Interpreted Contemporaneously by me, independently viewed  -No prior  EKG to compare [RC]   0421 HEART SCORE    History Moderately suspicious (1)  ECG Nonspecific repol disturbance (1)  Age 46-65 (1)  Risk factors > or = to 3 RF for atherosclerotic dx (2)  Troponin < or = Normal limit (0)    This patient's HEART score is 5    HEART Score Key:  Scores 0-3: 0.9-1.7% risk of adverse cardiac event. In the HEART Score study, these patients were discharged (0.99% in the retrospective study, 1.7% in the prospective study)  Scores 4-6: 12-16.6% risk of adverse cardiac event. In the HEART Score study, these patients were admitted to the hospital. (11.6% retrospective, 16.6% prospective)  Scores ?7: 50-65% risk of adverse cardiac event. In the HEART Score study, these patients were candidates for early invasive measures. (65.2% retrospective, 50.1% prospective)    [RC]   0424 Patient's heart score is a 5.  Although his chest pain is somewhat atypical and sharp presentation.  Patient does have a past medical history of heart attacks and states his pain felt similar to previous heart attack tonight but not as severe.  He is a very poor historian, poorly controlled diabetic, and has no transportation and a hard time following up with any of his physicians.  Taking all the above into account, it is felt to be better served by admission to the hospital at this time for further evaluation.  We will place a call to the hospitalist [RC]   2202 Discussed the patient's case with BUNNY Neville with [facility].  To admit to Dr. Bruce's care in a telemetry bed. [RC]   6221 Discovered patient should have been admitted to Dr. Atilio Melendrez.  Called and discussed with Leonela Zelaya.  To leave as is for now and they will transfer later to Dr. Melendrez's care.  I updated Dr. Melendrez on the patient's case and he is aware and will manage further. [RC]      ED Course User Index  [RC] James Thomas III, PA       Plan to perform a cardiac work-up given the patient's complaint tonight of chest pain as well as his  past medical history of coronary disease.  Will obtain EKG, chest x-ray, troponin, chemistries and blood counts.  We will provide nitroglycerin for pain we will rule out pneumothorax, non-STEMI, STEMI, unstable angina, acute aortic syndrome, and others.    Procedures:  Procedures      PPE: The patient wore a mask and I wore an N95 mask throughout the entire patient encounter.      The patient has started, but not completed, their COVID-19 vaccination series.    Diagnosis  Final diagnoses:   Chest pain, unspecified type   Hyperglycemia   Poorly controlled diabetes mellitus (HCC)       Note Disclaimer: At [facility], we believe that sharing information builds trust and better relationships. You are receiving this note because you recently visited [facility]. It is possible you will see health information before a provider has talked with you about it. This kind of information can be easy to misunderstand. To help you fully understand what it means for your health, we urge you to discuss this note with your provider.     Milind Olmstead MD  02/22/23 0209       Milind Olmstead MD  02/23/23 0303      Electronically signed by Milind Olmstead MD at 02/23/23 0303     Mone Lui, RN at 02/22/23 0528          Nursing report ED to floor  [facility]  63 y.o.  male    HPI :   Chief Complaint   Patient presents with   • Chest Pain       Admitting doctor:   Jitendra Bruce MD    Admitting diagnosis:   The primary encounter diagnosis was Chest pain, unspecified type. Diagnoses of Hyperglycemia and Poorly controlled diabetes mellitus (HCC) were also pertinent to this visit.    Code status:   Current Code Status       Date Active Code Status Order ID Comments User Context       2/22/2023 0514 CPR (Attempt to Resuscitate) [id number]  Leonela Zelaya APRN ED        Question Answer    Code Status (Patient has no pulse and is not breathing) CPR (Attempt to Resuscitate)    Medical Interventions (Patient has pulse or  is breathing) Full Support                    Allergies:   Patient has no known allergies.    Isolation:   No active isolations    Intake and Output  No intake or output data in the 24 hours ending 02/22/23 0528    Weight:       02/22/23  0030   Weight: 90.7 kg (200 lb)       Most recent vitals:   Vitals:    02/22/23 0421 02/22/23 0451 02/22/23 0521 02/22/23 0527   BP: 162/88 163/91 154/89    BP Location:       Patient Position:       Pulse: 62 59  60   Resp:       Temp:       TempSrc:       SpO2: 99% 95%  95%   Weight:       Height:           Active LDAs/IV Access:   Lines, Drains & Airways       Active LDAs       Name Placement date Placement time Site Days    Peripheral IV 02/22/23 0115 Anterior;Left;Proximal Forearm 02/22/23 0115  Forearm  less than 1                    Labs (abnormal labs have a star):   Labs Reviewed   COMPREHENSIVE METABOLIC PANEL - Abnormal; Notable for the following components:       Result Value    Glucose 190 (*)     Creatinine 1.28 (*)     Sodium 135 (*)     All other components within normal limits    Narrative:     GFR Normal >60  Chronic Kidney Disease <60  Kidney Failure <15     TROPONIN - Abnormal; Notable for the following components:    HS Troponin T 23 (*)     All other components within normal limits    Narrative:     High Sensitive Troponin T Reference Range:  <10.0 ng/L- Negative Female for AMI  <15.0 ng/L- Negative Male for AMI  >=10 - Abnormal Female indicating possible myocardial injury.  >=15 - Abnormal Male indicating possible myocardial injury.   Clinicians would have to utilize clinical acumen, EKG, Troponin, and serial changes to determine if it is an Acute Myocardial Infarction or myocardial injury due to an underlying chronic condition.        CBC WITH AUTO DIFFERENTIAL - Abnormal; Notable for the following components:    RBC 3.72 (*)     Hemoglobin 11.6 (*)     Hematocrit 34.8 (*)     All other components within normal limits   MAGNESIUM - Normal   HIGH  SENSITIVITIY TROPONIN T 2HR   BASIC METABOLIC PANEL   CBC (NO DIFF)   TROPONIN   HEMOGLOBIN A1C   POCT GLUCOSE FINGERSTICK   POCT GLUCOSE FINGERSTICK   POCT GLUCOSE FINGERSTICK   CBC AND DIFFERENTIAL    Narrative:     The following orders were created for panel order CBC & Differential.  Procedure                               Abnormality         Status                     ---------                               -----------         ------                     CBC Auto Differential[[id number]]        Abnormal            Final result                 Please view results for these tests on the individual orders.       EKG:   ECG 12 Lead Chest Pain   Preliminary Result   HEART RATE= 66  bpm   RR Interval= 909  ms   OK Interval= 184  ms   P Horizontal Axis= 18  deg   P Front Axis= 48  deg   QRSD Interval= 114  ms   QT Interval= 440  ms   QRS Axis= 72  deg   T Wave Axis= 29  deg   - OTHERWISE NORMAL ECG -   Sinus rhythm   Borderline intraventricular conduction delay   Minimal ST elevation, anterior leads   Electronically Signed By:    Date and Time of Study: 2023-02-22 00:44:22          Meds given in ED:   Medications   sodium chloride 0.9 % flush 10 mL (has no administration in time range)   sodium chloride 0.9 % flush 10 mL (has no administration in time range)   sodium chloride 0.9 % flush 10 mL (has no administration in time range)   sodium chloride 0.9 % infusion 40 mL (has no administration in time range)   nitroglycerin (NITROSTAT) SL tablet 0.4 mg (has no administration in time range)   acetaminophen (TYLENOL) tablet 650 mg (has no administration in time range)     Or   acetaminophen (TYLENOL) 160 MG/5ML solution 650 mg (has no administration in time range)     Or   acetaminophen (TYLENOL) suppository 650 mg (has no administration in time range)   ondansetron (ZOFRAN) tablet 4 mg (has no administration in time range)     Or   ondansetron (ZOFRAN) injection 4 mg (has no administration in time range)   calcium  carbonate (TUMS) chewable tablet 500 mg (200 mg elemental) (has no administration in time range)   dextrose (GLUTOSE) oral gel 15 g (has no administration in time range)   dextrose (D50W) (25 g/50 mL) IV injection 25 g (has no administration in time range)   glucagon (GLUCAGEN) injection 1 mg (has no administration in time range)   insulin lispro (ADMELOG) injection 0-9 Units (has no administration in time range)   morphine injection 4 mg (4 mg Intravenous Given 2/22/23 0220)   ondansetron (ZOFRAN) injection 4 mg (4 mg Intravenous Given 2/22/23 0220)       Imaging results:  XR Chest 1 View    Result Date: 2/22/2023  Cardiomegaly. Possible vascular congestion.  This report was finalized on 2/22/2023 1:30 AM by Dr. Kamala Carrasco M.D.       Ambulatory status:   - with assist    Social issues:   Social History     Socioeconomic History   • Marital status: Single       NIH Stroke Scale:         Mone Weiss RN  02/22/23 05:28 EST         Electronically signed by Mone Weiss RN at 02/22/23 0536

## 2025-05-21 ENCOUNTER — APPOINTMENT (OUTPATIENT)
Dept: GENERAL RADIOLOGY | Facility: HOSPITAL | Age: 65
DRG: 683 | End: 2025-05-21
Payer: MEDICARE

## 2025-05-21 ENCOUNTER — HOSPITAL ENCOUNTER (INPATIENT)
Facility: HOSPITAL | Age: 65
LOS: 4 days | Discharge: HOME HEALTH | DRG: 683 | End: 2025-05-26
Attending: EMERGENCY MEDICINE | Admitting: HOSPITALIST
Payer: MEDICARE

## 2025-05-21 ENCOUNTER — APPOINTMENT (OUTPATIENT)
Dept: CT IMAGING | Facility: HOSPITAL | Age: 65
DRG: 683 | End: 2025-05-21
Payer: MEDICARE

## 2025-05-21 DIAGNOSIS — N17.9 ACUTE KIDNEY INJURY: ICD-10-CM

## 2025-05-21 DIAGNOSIS — I95.9 HYPOTENSION, UNSPECIFIED HYPOTENSION TYPE: ICD-10-CM

## 2025-05-21 DIAGNOSIS — N18.9 ACUTE KIDNEY INJURY SUPERIMPOSED ON CHRONIC KIDNEY DISEASE: Primary | ICD-10-CM

## 2025-05-21 DIAGNOSIS — R41.82 ALTERED MENTAL STATUS, UNSPECIFIED ALTERED MENTAL STATUS TYPE: ICD-10-CM

## 2025-05-21 DIAGNOSIS — N17.9 ACUTE KIDNEY INJURY SUPERIMPOSED ON CHRONIC KIDNEY DISEASE: Primary | ICD-10-CM

## 2025-05-21 LAB
ALBUMIN SERPL-MCNC: 4.3 G/DL (ref 3.5–5.2)
ALBUMIN/GLOB SERPL: 1.3 G/DL
ALP SERPL-CCNC: 52 U/L (ref 39–117)
ALT SERPL W P-5'-P-CCNC: 27 U/L (ref 1–41)
AMPHET+METHAMPHET UR QL: NEGATIVE
AMPHETAMINES UR QL: NEGATIVE
ANION GAP SERPL CALCULATED.3IONS-SCNC: 12 MMOL/L (ref 5–15)
AST SERPL-CCNC: 31 U/L (ref 1–40)
BARBITURATES UR QL SCN: NEGATIVE
BASOPHILS # BLD AUTO: 0.02 10*3/MM3 (ref 0–0.2)
BASOPHILS NFR BLD AUTO: 0.3 % (ref 0–1.5)
BENZODIAZ UR QL SCN: NEGATIVE
BILIRUB SERPL-MCNC: 0.5 MG/DL (ref 0–1.2)
BILIRUB UR QL STRIP: NEGATIVE
BUN SERPL-MCNC: 33 MG/DL (ref 8–23)
BUN/CREAT SERPL: 12.3 (ref 7–25)
BUPRENORPHINE SERPL-MCNC: NEGATIVE NG/ML
CALCIUM SPEC-SCNC: 9.1 MG/DL (ref 8.6–10.5)
CANNABINOIDS SERPL QL: NEGATIVE
CHLORIDE SERPL-SCNC: 95 MMOL/L (ref 98–107)
CLARITY UR: ABNORMAL
CO2 SERPL-SCNC: 25 MMOL/L (ref 22–29)
COCAINE UR QL: NEGATIVE
COLOR UR: ABNORMAL
CREAT SERPL-MCNC: 2.69 MG/DL (ref 0.76–1.27)
DEPRECATED RDW RBC AUTO: 45.5 FL (ref 37–54)
EGFRCR SERPLBLD CKD-EPI 2021: 25.5 ML/MIN/1.73
EOSINOPHIL # BLD AUTO: 0.03 10*3/MM3 (ref 0–0.4)
EOSINOPHIL NFR BLD AUTO: 0.5 % (ref 0.3–6.2)
ERYTHROCYTE [DISTWIDTH] IN BLOOD BY AUTOMATED COUNT: 13.1 % (ref 12.3–15.4)
ETHANOL BLD-MCNC: <10 MG/DL (ref 0–10)
ETHANOL UR QL: <0.01 %
FENTANYL UR-MCNC: NEGATIVE NG/ML
GEN 5 1HR TROPONIN T REFLEX: 41 NG/L
GLOBULIN UR ELPH-MCNC: 3.2 GM/DL
GLUCOSE SERPL-MCNC: 162 MG/DL (ref 65–99)
GLUCOSE UR STRIP-MCNC: ABNORMAL MG/DL
HCT VFR BLD AUTO: 37.2 % (ref 37.5–51)
HGB BLD-MCNC: 12.6 G/DL (ref 13–17.7)
HGB UR QL STRIP.AUTO: NEGATIVE
IMM GRANULOCYTES # BLD AUTO: 0.04 10*3/MM3 (ref 0–0.05)
IMM GRANULOCYTES NFR BLD AUTO: 0.7 % (ref 0–0.5)
KETONES UR QL STRIP: ABNORMAL
LEUKOCYTE ESTERASE UR QL STRIP.AUTO: NEGATIVE
LYMPHOCYTES # BLD AUTO: 0.95 10*3/MM3 (ref 0.7–3.1)
LYMPHOCYTES NFR BLD AUTO: 16.4 % (ref 19.6–45.3)
MAGNESIUM SERPL-MCNC: 2.5 MG/DL (ref 1.6–2.4)
MCH RBC QN AUTO: 31.9 PG (ref 26.6–33)
MCHC RBC AUTO-ENTMCNC: 33.9 G/DL (ref 31.5–35.7)
MCV RBC AUTO: 94.2 FL (ref 79–97)
METHADONE UR QL SCN: NEGATIVE
MONOCYTES # BLD AUTO: 0.89 10*3/MM3 (ref 0.1–0.9)
MONOCYTES NFR BLD AUTO: 15.4 % (ref 5–12)
NEUTROPHILS NFR BLD AUTO: 3.85 10*3/MM3 (ref 1.7–7)
NEUTROPHILS NFR BLD AUTO: 66.7 % (ref 42.7–76)
NITRITE UR QL STRIP: NEGATIVE
NRBC BLD AUTO-RTO: 0 /100 WBC (ref 0–0.2)
NT-PROBNP SERPL-MCNC: 379 PG/ML (ref 0–900)
OPIATES UR QL: POSITIVE
OXYCODONE UR QL SCN: NEGATIVE
PCP UR QL SCN: NEGATIVE
PH UR STRIP.AUTO: <=5 [PH] (ref 5–8)
PHOSPHATE SERPL-MCNC: 2.9 MG/DL (ref 2.5–4.5)
PLATELET # BLD AUTO: 189 10*3/MM3 (ref 140–450)
PMV BLD AUTO: 11.9 FL (ref 6–12)
POTASSIUM SERPL-SCNC: 4.7 MMOL/L (ref 3.5–5.2)
PROT SERPL-MCNC: 7.5 G/DL (ref 6–8.5)
PROT UR QL STRIP: ABNORMAL
QT INTERVAL: 426 MS
QTC INTERVAL: 463 MS
RBC # BLD AUTO: 3.95 10*6/MM3 (ref 4.14–5.8)
SODIUM SERPL-SCNC: 132 MMOL/L (ref 136–145)
SP GR UR STRIP: 1.02 (ref 1–1.03)
TRICYCLICS UR QL SCN: NEGATIVE
TROPONIN T % DELTA: -20
TROPONIN T NUMERIC DELTA: -10 NG/L
TROPONIN T SERPL HS-MCNC: 51 NG/L
UROBILINOGEN UR QL STRIP: ABNORMAL
WBC NRBC COR # BLD AUTO: 5.78 10*3/MM3 (ref 3.4–10.8)

## 2025-05-21 PROCEDURE — 70450 CT HEAD/BRAIN W/O DYE: CPT

## 2025-05-21 PROCEDURE — G0378 HOSPITAL OBSERVATION PER HR: HCPCS

## 2025-05-21 PROCEDURE — 82077 ASSAY SPEC XCP UR&BREATH IA: CPT | Performed by: EMERGENCY MEDICINE

## 2025-05-21 PROCEDURE — 99285 EMERGENCY DEPT VISIT HI MDM: CPT

## 2025-05-21 PROCEDURE — 25810000003 LACTATED RINGERS SOLUTION: Performed by: EMERGENCY MEDICINE

## 2025-05-21 PROCEDURE — 25010000002 NALOXONE PER 1 MG: Performed by: EMERGENCY MEDICINE

## 2025-05-21 PROCEDURE — 36415 COLL VENOUS BLD VENIPUNCTURE: CPT

## 2025-05-21 PROCEDURE — 84300 ASSAY OF URINE SODIUM: CPT | Performed by: INTERNAL MEDICINE

## 2025-05-21 PROCEDURE — 84156 ASSAY OF PROTEIN URINE: CPT | Performed by: INTERNAL MEDICINE

## 2025-05-21 PROCEDURE — 85025 COMPLETE CBC W/AUTO DIFF WBC: CPT | Performed by: EMERGENCY MEDICINE

## 2025-05-21 PROCEDURE — 93010 ELECTROCARDIOGRAM REPORT: CPT | Performed by: INTERNAL MEDICINE

## 2025-05-21 PROCEDURE — 83735 ASSAY OF MAGNESIUM: CPT | Performed by: EMERGENCY MEDICINE

## 2025-05-21 PROCEDURE — 84100 ASSAY OF PHOSPHORUS: CPT | Performed by: EMERGENCY MEDICINE

## 2025-05-21 PROCEDURE — 83880 ASSAY OF NATRIURETIC PEPTIDE: CPT | Performed by: EMERGENCY MEDICINE

## 2025-05-21 PROCEDURE — 71045 X-RAY EXAM CHEST 1 VIEW: CPT

## 2025-05-21 PROCEDURE — 84484 ASSAY OF TROPONIN QUANT: CPT | Performed by: EMERGENCY MEDICINE

## 2025-05-21 PROCEDURE — 82570 ASSAY OF URINE CREATININE: CPT | Performed by: INTERNAL MEDICINE

## 2025-05-21 PROCEDURE — 80053 COMPREHEN METABOLIC PANEL: CPT | Performed by: EMERGENCY MEDICINE

## 2025-05-21 PROCEDURE — 81003 URINALYSIS AUTO W/O SCOPE: CPT | Performed by: EMERGENCY MEDICINE

## 2025-05-21 PROCEDURE — 80307 DRUG TEST PRSMV CHEM ANLYZR: CPT | Performed by: EMERGENCY MEDICINE

## 2025-05-21 PROCEDURE — 93005 ELECTROCARDIOGRAM TRACING: CPT | Performed by: EMERGENCY MEDICINE

## 2025-05-21 RX ORDER — NALOXONE HCL 0.4 MG/ML
0.4 VIAL (ML) INJECTION ONCE
Status: COMPLETED | OUTPATIENT
Start: 2025-05-21 | End: 2025-05-21

## 2025-05-21 RX ADMIN — SODIUM CHLORIDE, POTASSIUM CHLORIDE, SODIUM LACTATE AND CALCIUM CHLORIDE 1000 ML: 600; 310; 30; 20 INJECTION, SOLUTION INTRAVENOUS at 17:58

## 2025-05-21 RX ADMIN — NALOXONE HYDROCHLORIDE 0.4 MG: 0.4 INJECTION, SOLUTION INTRAMUSCULAR; INTRAVENOUS; SUBCUTANEOUS at 15:24

## 2025-05-21 RX ADMIN — SODIUM CHLORIDE, POTASSIUM CHLORIDE, SODIUM LACTATE AND CALCIUM CHLORIDE 1000 ML: 600; 310; 30; 20 INJECTION, SOLUTION INTRAVENOUS at 15:47

## 2025-05-21 NOTE — ED NOTES
"   05/21/25 1524   Peripheral IV 05/21/25 1524 18 G Anterior;Left;Upper Arm   Placement date: If unknown, DO NOT use \"Add Comment\" note/Placement time: If unknown, DO NOT use \"Add Comment\" note: 05/21/25 1524   Hand Hygiene Completed: Yes  Size (Gauge): 18 G  Orientation: Anterior;Left;Upper  Location: Arm  Local Anesthetic: No...   Site Assessment Clean;Dry;Intact   Dressing Type Transparent   Line Status Blood return noted;Flushed;Saline locked   Dressing Status Clean;Dry;Intact     Ultrasound Inserted IV Site: left upper arm  Catheter Length: 1.88\"  Diameter: 0.55 cm  Depth: 1.0cm    Vascular Access Score = 5  1) Palpable / Visible / Distended  2) Palpable / Visible / Not Distended  3) Easily Palpable / Not Visibile  4) Poorly Palpable / Visible  5) Poorly / Nonpalpable / NV     Angiocath visualized and advanced in the venous lumen. Flush visualized superiorly to insertion site in venous lumen. Blood return noted and collected during insertion. No signs of phlebitis noted. Standard IV dressing placed and secured.      "

## 2025-05-21 NOTE — ED NOTES
Nursing report ED to floor  Eric Simms  65 y.o.  male    HPI :  HPI  Stated Reason for Visit: Pt from derm parking prema via EMS; found unresponsive on a wheelchair bus by bystanders. Reported stroke in the last week. Diabetic.  History Obtained From: EMS    Chief Complaint  Chief Complaint   Patient presents with    Altered Mental Status     Pt from derm parking lot via EMS; found unresponsive on a wheelchair bus by bystanders. Reported stroke in the last week. Diabetic.       Admitting doctor:   Atilio Melendrez MD    Admitting diagnosis:   The primary encounter diagnosis was Acute kidney injury superimposed on chronic kidney disease. Diagnoses of Altered mental status, unspecified altered mental status type and Hypotension, unspecified hypotension type were also pertinent to this visit.    Code status:   Current Code Status       Date Active Code Status Order ID Comments User Context       Prior            Allergies:   Patient has no known allergies.    Isolation:   No active isolations    Intake and Output    Intake/Output Summary (Last 24 hours) at 5/21/2025 1838  Last data filed at 5/21/2025 1758  Gross per 24 hour   Intake 1000 ml   Output --   Net 1000 ml       Weight:       05/21/25  1508   Weight: 84.2 kg (185 lb 10 oz)       Most recent vitals:   Vitals:    05/21/25 1641 05/21/25 1656 05/21/25 1746 05/21/25 1801   BP:   122/67 118/67   Pulse: 71 68 70 70   Resp:   16 16   Temp:       TempSrc:       SpO2: 99% 98% 95% 98%   Weight:       Height:           Active LDAs/IV Access:   Lines, Drains & Airways       Active LDAs       Name Placement date Placement time Site Days    Peripheral IV 05/21/25 1524 18 G Anterior;Left;Upper Arm 05/21/25  1524  Arm  less than 1                    Labs (abnormal labs have a star):   Labs Reviewed   COMPREHENSIVE METABOLIC PANEL - Abnormal; Notable for the following components:       Result Value    Glucose 162 (*)     BUN 33 (*)     Creatinine 2.69 (*)     Sodium 132 (*)      Chloride 95 (*)     eGFR 25.5 (*)     All other components within normal limits    Narrative:     GFR Categories in Chronic Kidney Disease (CKD)              GFR Category          GFR (mL/min/1.73)    Interpretation  G1                    90 or greater        Normal or high (1)  G2                    60-89                Mild decrease (1)  G3a                   45-59                Mild to moderate decrease  G3b                   30-44                Moderate to severe decrease  G4                    15-29                Severe decrease  G5                    14 or less           Kidney failure    (1)In the absence of evidence of kidney disease, neither GFR category G1 or G2 fulfill the criteria for CKD.    eGFR calculation 2021 CKD-EPI creatinine equation, which does not include race as a factor   URINALYSIS W/ MICROSCOPIC IF INDICATED (NO CULTURE) - Abnormal; Notable for the following components:    Color, UA Dark Yellow (*)     Appearance, UA Cloudy (*)     Glucose, UA >=1000 mg/dL (3+) (*)     Ketones, UA Trace (*)     Protein, UA Trace (*)     All other components within normal limits    Narrative:     Urine microscopic not indicated.   TROPONIN - Abnormal; Notable for the following components:    HS Troponin T 51 (*)     All other components within normal limits    Narrative:     High Sensitive Troponin T Reference Range:  <14.0 ng/L- Negative Female for AMI  <22.0 ng/L- Negative Male for AMI  >=14 - Abnormal Female indicating possible myocardial injury.  >=22 - Abnormal Male indicating possible myocardial injury.   Clinicians would have to utilize clinical acumen, EKG, Troponin, and serial changes to determine if it is an Acute Myocardial Infarction or myocardial injury due to an underlying chronic condition.        URINE DRUG SCREEN - Abnormal; Notable for the following components:    Opiate Screen Positive (*)     All other components within normal limits    Narrative:     Cutoff For Drugs  Screened:    Amphetamines               500 ng/ml  Barbiturates               200 ng/ml  Benzodiazepines            150 ng/ml  Cocaine                    150 ng/ml  Methadone                  200 ng/ml  Opiates                    100 ng/ml  Phencyclidine               25 ng/ml  THC                         50 ng/ml  Methamphetamine            500 ng/ml  Tricyclic Antidepressants  300 ng/ml  Oxycodone                  100 ng/ml  Buprenorphine               10 ng/ml    The normal value for all drugs tested is negative. This report includes unconfirmed screening results, with the cutoff values listed, to be used for medical treatment purposes only.  Unconfirmed results must not be used for non-medical purposes such as employment or legal testing.  Clinical consideration should be applied to any drug of abuse test, particularly when unconfirmed results are used.     MAGNESIUM - Abnormal; Notable for the following components:    Magnesium 2.5 (*)     All other components within normal limits   CBC WITH AUTO DIFFERENTIAL - Abnormal; Notable for the following components:    RBC 3.95 (*)     Hemoglobin 12.6 (*)     Hematocrit 37.2 (*)     Lymphocyte % 16.4 (*)     Monocyte % 15.4 (*)     Immature Grans % 0.7 (*)     All other components within normal limits   HIGH SENSITIVITIY TROPONIN T 1HR - Abnormal; Notable for the following components:    HS Troponin T 41 (*)     All other components within normal limits    Narrative:     High Sensitive Troponin T Reference Range:  <14.0 ng/L- Negative Female for AMI  <22.0 ng/L- Negative Male for AMI  >=14 - Abnormal Female indicating possible myocardial injury.  >=22 - Abnormal Male indicating possible myocardial injury.   Clinicians would have to utilize clinical acumen, EKG, Troponin, and serial changes to determine if it is an Acute Myocardial Infarction or myocardial injury due to an underlying chronic condition.        BNP (IN-HOUSE) - Normal    Narrative:     This assay is used  as an aid in the diagnosis of individuals suspected of having heart failure. It can be used as an aid in the diagnosis of acute decompensated heart failure (ADHF) in patients presenting with signs and symptoms of ADHF to the emergency department (ED). In addition, NT-proBNP of <300 pg/mL indicates ADHF is not likely.    Age Range Result Interpretation  NT-proBNP Concentration (pg/mL:      <50             Positive            >450                   Gray                 300-450                    Negative             <300    50-75           Positive            >900                  Gray                300-900                  Negative            <300      >75             Positive            >1800                  Gray                300-1800                  Negative            <300   PHOSPHORUS - Normal   FENTANYL, URINE - Normal    Narrative:     Negative Threshold:      Fentanyl 5 ng/mL     The normal value for the drug tested is negative. This report includes final unconfirmed screening results to be used for medical treatment purposes only. Unconfirmed results must not be used for non-medical purposes such as employment or legal testing. Clinical consideration should be applied to any drug of abuse test, particularly when unconfirmed results are used.          ETHANOL   CBC AND DIFFERENTIAL    Narrative:     The following orders were created for panel order CBC & Differential.  Procedure                               Abnormality         Status                     ---------                               -----------         ------                     CBC Auto Differential[[id number]]        Abnormal            Final result                 Please view results for these tests on the individual orders.       EKG:   ECG 12 Lead Altered Mental Status   Preliminary Result   HEART RATE=71  bpm   RR Kjvvpstz=901  ms   [place] Urzhmbir=482  ms   P Horizontal Axis=4  deg   P Front Axis=25  deg   QRSD Interval=93  ms   QT  Rxwwxsrp=183  ms   KNgN=217  ms   QRS Axis=-21  deg   T Wave Axis=33  deg   - OTHERWISE NORMAL ECG -   Sinus rhythm   Borderline left axis deviation   Abnormal R-wave progression, early transition   Date and Time of Study:2025-05-21 15:14:23          Meds given in ED:   Medications   lactated ringers bolus 1,000 mL (1,000 mL Intravenous New Bag 5/21/25 1758)   lactated ringers bolus 1,000 mL (0 mL Intravenous Stopped 5/21/25 1758)   naloxone (NARCAN) injection 0.4 mg (0.4 mg Intravenous Given 5/21/25 1524)       Imaging results:  CT Head Without Contrast  Result Date: 5/21/2025   Chronic changes as noted above, no acute intracranial abnormality.        This report was finalized on 5/21/2025 5:30 PM by Dr. Rodney King M.D on Workstation: [id number]      XR Chest 1 View  Result Date: 5/21/2025  1. Borderline cardiomegaly. 2. Lungs appear clear.   This report was finalized on 5/21/2025 4:29 PM by Dr. Andriy Man M.D on Workstation: [id number]        Ambulatory status:   - ad jordan.    Social issues:   Social History     Socioeconomic History    Marital status:    Tobacco Use    Smoking status: Never    Smokeless tobacco: Never   Vaping Use    Vaping status: Never Used   Substance and Sexual Activity    Alcohol use: Not Currently    Drug use: Not Currently     Comment: crack - last use 15 years ago    Sexual activity: Defer       Peripheral Neurovascular  Peripheral Neurovascular (Adult)  Peripheral Neurovascular WDL: WDL    Neuro Cognitive  Neuro Cognitive (Adult)  Cognitive/Neuro/Behavioral WDL: .WDL except, arousability, orientation  Arousal Level: arouses to voice  Orientation: oriented x 4    Learning  Learning Assessment  Learning Readiness and Ability: no barriers identified    Respiratory  Respiratory WDL  Respiratory WDL: WDL    Abdominal Pain  Safety Interventions  Safety Precautions/Falls Reduction: assistive device/personal items within reach, fall reduction program maintained, nonskid  shoes/slippers when out of bed    Pain Assessments  Pain (Adult)  (0-10) Pain Rating: Activity: 0    NIH Stroke Scale       Cyndi Poole RN  05/21/25 18:38 EDT

## 2025-05-21 NOTE — ED TRIAGE NOTES
Pt from derm parking lot via EMS; found unresponsive on a wheelchair bus by bystanders. Reported stroke in the last week. Diabetic. EMS initially unable to palpate radial pulse, BP was 70/59 initially. Pt is responding to voice at time of initial evaluation.

## 2025-05-21 NOTE — ED PROVIDER NOTES
EMERGENCY DEPARTMENT ENCOUNTER  Room Number:  13/13  PCP: Erlin Madrigal MD  Independent Historians: Patient and EMS  Date of encounter:  5/21/2025  Patient Care Team:  Erlin Madrigal MD as PCP - General (Family Medicine)     HPI:  Chief Complaint: had concerns including Altered Mental Status (Pt from derm parking lot via EMS; found unresponsive on a wheelchair bus by bystanders. Reported stroke in the last week. Diabetic.).     A complete HPI/ROS/PMH/PSH/SH/FH are unobtainable due to: Altered Mental Status    Chronic or social conditions impacting patient care (Social Determinants of Health): None    Context: Eric Simms is a 65 y.o. male with a medical history of diabetes, hypertension, CKD who presents to the ED c/o acute fatigue and altered mental status.  Majority of history is provided by EMS at bedside.  They were called from a parking lot when patient was found to be lethargic.  He was only arousable to painful stimuli but would give his name.  He is notably hypotensive with a systolic in the 70s en route.  They were unable to establish an IV line.  Blood sugar was greater than 100.  Patient himself denies any current symptoms.  He states that he was discharged from [facility] yesterday but cannot really give the details of his hospitalization.  He denies any current headache, nausea, vomiting, fever, chills, abdominal pain, chest pain or shortness of breath.    Review of prior external notes (non-ED) -and- Review of prior external test results outside of this encounter:  Most recent progress note from patient's hospitalization from yesterday reviewed.  Patient was admitted for hyperglycemia and strokelike symptoms.  Stroke workup was unremarkable.  His symptoms at that time were hallucinations and visual disturbances.    PAST MEDICAL HISTORY  Active Ambulatory Problems     Diagnosis Date Noted    Chest pain, unspecified type 02/22/2023    Type 2 diabetes mellitus with hyperglycemia 02/22/2023    HTN  (hypertension) 02/22/2023    Obesity (BMI 30-39.9) 02/22/2023    Anemia, chronic disease 02/22/2023    CKD (chronic kidney disease) stage 2, GFR 60-89 ml/min 02/22/2023    Seizure disorder 02/22/2023     Resolved Ambulatory Problems     Diagnosis Date Noted    No Resolved Ambulatory Problems     Past Medical History:   Diagnosis Date    Coronary artery disease     Diabetes mellitus     GERD (gastroesophageal reflux disease)     Hyperlipidemia     Hypertension     Peripheral neuropathy     Seizures     Stroke        PAST SURGICAL HISTORY  History reviewed. No pertinent surgical history.    FAMILY HISTORY  History reviewed. No pertinent family history.    SOCIAL HISTORY  Social History     Socioeconomic History    Marital status:    Tobacco Use    Smoking status: Never    Smokeless tobacco: Never   Vaping Use    Vaping status: Never Used   Substance and Sexual Activity    Alcohol use: Not Currently    Drug use: Not Currently     Comment: crack - last use 15 years ago    Sexual activity: Defer       ALLERGIES  Patient has no known allergies.    REVIEW OF SYSTEMS  Review of Systems  Included in HPI  All systems reviewed and negative except for those discussed in HPI.    PHYSICAL EXAM    I have reviewed the triage vital signs and nursing notes.    ED Triage Vitals   Temp Heart Rate Resp BP SpO2   05/21/25 1509 05/21/25 1507 05/21/25 1510 05/21/25 1508 05/21/25 1507   97.6 °F (36.4 °C) 67 16 102/53 98 %      Temp src Heart Rate Source Patient Position BP Location FiO2 (%)   05/21/25 1509 -- -- -- --   Oral           Physical Exam  Vitals and nursing note reviewed.   Constitutional:       General: He is not in acute distress.     Appearance: Normal appearance. He is not ill-appearing, toxic-appearing or diaphoretic.      Comments: Somnolent but arousable to voice, AO x 4   HENT:      Head: Normocephalic and atraumatic.      Nose: Nose normal.      Mouth/Throat:      Mouth: Mucous membranes are dry.   Eyes:       Extraocular Movements: Extraocular movements intact.      Conjunctiva/sclera: Conjunctivae normal.      Pupils: Pupils are equal, round, and reactive to light.      Comments: Pupils 1 mm and reactive bilaterally   Cardiovascular:      Rate and Rhythm: Normal rate and regular rhythm.      Pulses: Normal pulses.      Heart sounds: Normal heart sounds. No murmur heard.     No friction rub. No gallop.   Pulmonary:      Effort: Pulmonary effort is normal. No respiratory distress.      Breath sounds: Normal breath sounds. No stridor. No wheezing, rhonchi or rales.   Abdominal:      General: Abdomen is flat. There is no distension.      Palpations: Abdomen is soft.      Tenderness: There is abdominal tenderness (suprapubic). There is no guarding or rebound.   Musculoskeletal:      Cervical back: Normal range of motion and neck supple.   Skin:     General: Skin is warm and dry.      Capillary Refill: Capillary refill takes less than 2 seconds.   Neurological:      General: No focal deficit present.      Mental Status: He is oriented to person, place, and time. Mental status is at baseline.   Psychiatric:         Mood and Affect: Mood normal.         Behavior: Behavior normal.         Thought Content: Thought content normal.         Judgment: Judgment normal.         LAB RESULTS  Recent Results (from the past 24 hours)   ECG 12 Lead Altered Mental Status    Collection Time: 05/21/25  3:14 PM   Result Value Ref Range    QT Interval 426 ms    QTC Interval 463 ms   Comprehensive Metabolic Panel    Collection Time: 05/21/25  3:27 PM    Specimen: Blood   Result Value Ref Range    Glucose 162 (H) 65 - 99 mg/dL    BUN 33 (H) 8 - 23 mg/dL    Creatinine 2.69 (H) 0.76 - 1.27 mg/dL    Sodium 132 (L) 136 - 145 mmol/L    Potassium 4.7 3.5 - 5.2 mmol/L    Chloride 95 (L) 98 - 107 mmol/L    CO2 25.0 22.0 - 29.0 mmol/L    Calcium 9.1 8.6 - 10.5 mg/dL    Total Protein 7.5 6.0 - 8.5 g/dL    Albumin 4.3 3.5 - 5.2 g/dL    ALT (SGPT) 27 1 - 41  U/L    AST (SGOT) 31 1 - 40 U/L    Alkaline Phosphatase 52 39 - 117 U/L    Total Bilirubin 0.5 0.0 - 1.2 mg/dL    Globulin 3.2 gm/dL    A/G Ratio 1.3 g/dL    BUN/Creatinine Ratio 12.3 7.0 - 25.0    Anion Gap 12.0 5.0 - 15.0 mmol/L    eGFR 25.5 (L) >60.0 mL/min/1.73   BNP    Collection Time: 05/21/25  3:27 PM    Specimen: Blood   Result Value Ref Range    proBNP 379.0 0.0 - 900.0 pg/mL   High Sensitivity Troponin T    Collection Time: 05/21/25  3:27 PM    Specimen: Blood   Result Value Ref Range    HS Troponin T 51 (H) <22 ng/L   Ethanol    Collection Time: 05/21/25  3:27 PM    Specimen: Blood   Result Value Ref Range    Ethanol <10 0 - 10 mg/dL    Ethanol % <0.010 %   Magnesium    Collection Time: 05/21/25  3:27 PM    Specimen: Blood   Result Value Ref Range    Magnesium 2.5 (H) 1.6 - 2.4 mg/dL   Phosphorus    Collection Time: 05/21/25  3:27 PM    Specimen: Blood   Result Value Ref Range    Phosphorus 2.9 2.5 - 4.5 mg/dL   CBC Auto Differential    Collection Time: 05/21/25  3:27 PM    Specimen: Blood   Result Value Ref Range    WBC 5.78 3.40 - 10.80 10*3/mm3    RBC 3.95 (L) 4.14 - 5.80 10*6/mm3    Hemoglobin 12.6 (L) 13.0 - 17.7 g/dL    Hematocrit 37.2 (L) 37.5 - 51.0 %    MCV 94.2 79.0 - 97.0 fL    MCH 31.9 26.6 - 33.0 pg    MCHC 33.9 31.5 - 35.7 g/dL    RDW 13.1 12.3 - 15.4 %    RDW-SD 45.5 37.0 - 54.0 fl    MPV 11.9 6.0 - 12.0 fL    Platelets 189 140 - 450 10*3/mm3    Neutrophil % 66.7 42.7 - 76.0 %    Lymphocyte % 16.4 (L) 19.6 - 45.3 %    Monocyte % 15.4 (H) 5.0 - 12.0 %    Eosinophil % 0.5 0.3 - 6.2 %    Basophil % 0.3 0.0 - 1.5 %    Immature Grans % 0.7 (H) 0.0 - 0.5 %    Neutrophils, Absolute 3.85 1.70 - 7.00 10*3/mm3    Lymphocytes, Absolute 0.95 0.70 - 3.10 10*3/mm3    Monocytes, Absolute 0.89 0.10 - 0.90 10*3/mm3    Eosinophils, Absolute 0.03 0.00 - 0.40 10*3/mm3    Basophils, Absolute 0.02 0.00 - 0.20 10*3/mm3    Immature Grans, Absolute 0.04 0.00 - 0.05 10*3/mm3    nRBC 0.0 0.0 - 0.2 /100 WBC   High  Sensitivity Troponin T 1Hr    Collection Time: 05/21/25  4:58 PM    Specimen: Blood   Result Value Ref Range    HS Troponin T 41 (H) <22 ng/L    Troponin T Numeric Delta -10 ng/L    Troponin T % Delta -20 Abnormal if >/= 20%   Urinalysis With Microscopic If Indicated (No Culture) - Urine, Clean Catch    Collection Time: 05/21/25  6:01 PM    Specimen: Urine, Clean Catch   Result Value Ref Range    Color, UA Dark Yellow (A) Yellow, Straw    Appearance, UA Cloudy (A) Clear    pH, UA <=5.0 5.0 - 8.0    Specific Gravity, UA 1.019 1.005 - 1.030    Glucose, UA >=1000 mg/dL (3+) (A) Negative    Ketones, UA Trace (A) Negative    Bilirubin, UA Negative Negative    Blood, UA Negative Negative    Protein, UA Trace (A) Negative    Leuk Esterase, UA Negative Negative    Nitrite, UA Negative Negative    Urobilinogen, UA 1.0 E.U./dL 0.2 - 1.0 E.U./dL   Urine Drug Screen - Urine, Clean Catch    Collection Time: 05/21/25  6:01 PM    Specimen: Urine, Clean Catch   Result Value Ref Range    THC, Screen, Urine Negative Negative    Phencyclidine (PCP), Urine Negative Negative    Cocaine Screen, Urine Negative Negative    Methamphetamine, Ur Negative Negative    Opiate Screen Positive (A) Negative    Amphetamine Screen, Urine Negative Negative    Benzodiazepine Screen, Urine Negative Negative    Tricyclic Antidepressants Screen Negative Negative    Methadone Screen, Urine Negative Negative    Barbiturates Screen, Urine Negative Negative    Oxycodone Screen, Urine Negative Negative    Buprenorphine, Screen, Urine Negative Negative       RADIOLOGY  CT Head Without Contrast  Result Date: 5/21/2025  HEAD CT WITHOUT CONTRAST  REASON:  ams, lethargy, recent stroke  COMPARISON STUDIES:  None Available.  TECHNIQUE:  Axial images were acquired from the skull base to vertex without contrast, including multiplanar reformats, per standard departmental protocol.    Radiation dose reduction techniques were utilized, including automated exposure  control, and exposure modulation based on body size.  FINDINGS:  There is no CT evidence of acute intracranial hemorrhage, mass, or infarct. There is volume loss, but there is no evidence of hydrocephalus or extra-axial fluid collection.  There are nonspecific white matter changes, and there is left frontal and left parieto-occipital cortical encephalomalacia, but there is no evidence of acute intracranial abnormality.  Skull base, calvarium, and extracranial soft tissues show no acute abnormality.       Chronic changes as noted above, no acute intracranial abnormality.        This report was finalized on 5/21/2025 5:30 PM by Dr. Rodney King M.D on Workstation: [id number]      XR Chest 1 View  Result Date: 5/21/2025  XR CHEST 1 VW-5/21/2025  HISTORY: Altered mental status.  Heart size is at the upper limits of normal. Lungs appear clear but are slightly underinflated. There is some aortic calcification.      1. Borderline cardiomegaly. 2. Lungs appear clear.   This report was finalized on 5/21/2025 4:29 PM by Dr. Andriy Man M.D on Workstation: [id number]        MEDICATIONS GIVEN IN ER  Medications   lactated ringers bolus 1,000 mL (1,000 mL Intravenous New Bag 5/21/25 1758)   lactated ringers bolus 1,000 mL (0 mL Intravenous Stopped 5/21/25 1758)   naloxone (NARCAN) injection 0.4 mg (0.4 mg Intravenous Given 5/21/25 1524)       ORDERS PLACED DURING THIS VISIT:  Orders Placed This Encounter   Procedures    CT Head Without Contrast    XR Chest 1 View    Comprehensive Metabolic Panel    Urinalysis With Microscopic If Indicated (No Culture) - Urine, Clean Catch    BNP    High Sensitivity Troponin T    Ethanol    Urine Drug Screen - Urine, Clean Catch    Magnesium    Phosphorus    CBC Auto Differential    High Sensitivity Troponin T 1Hr    Fentanyl, Urine - Urine, Clean Catch    LIPPS (on-call MD unless specified)    ECG 12 Lead Altered Mental Status    Initiate Observation Status    CBC & Differential        OUTPATIENT MEDICATION MANAGEMENT:  Current Facility-Administered Medications Ordered in Epic   Medication Dose Route Frequency Provider Last Rate Last Admin    lactated ringers bolus 1,000 mL  1,000 mL Intravenous Once Vidal Chew MD 1,000 mL/hr at 05/21/25 1758 1,000 mL at 05/21/25 1758     Current Outpatient Medications Ordered in Epic   Medication Sig Dispense Refill    allopurinol (ZYLOPRIM) 100 MG tablet Take 100 mg by mouth Daily.      amLODIPine (NORVASC) 10 MG tablet Take 10 mg by mouth Daily.      atorvastatin (LIPITOR) 10 MG tablet Take 10 mg by mouth Daily.      chlorthalidone (HYGROTON) 25 MG tablet Take 25 mg by mouth Daily.      clopidogrel (PLAVIX) 75 MG tablet Take 75 mg by mouth Daily.      gabapentin (NEURONTIN) 400 MG capsule Take 800 mg by mouth 3 (Three) Times a Day As Needed.      HYDROcodone-acetaminophen (NORCO) 7.5-325 MG per tablet Take 1 tablet by mouth Every 8 (Eight) Hours As Needed for Moderate Pain.      insulin glargine (LANTUS, SEMGLEE) 100 UNIT/ML injection Inject 75 Units under the skin into the appropriate area as directed Every Night.      Insulin Lispro (humaLOG) 100 UNIT/ML injection Inject 10 Units under the skin into the appropriate area as directed 3 (Three) Times a Day Before Meals.      isosorbide mononitrate (IMDUR) 120 MG 24 hr tablet Take 120 mg by mouth Daily.      levETIRAcetam (KEPPRA) 500 MG tablet Take 500 mg by mouth 2 (Two) Times a Day.      losartan (COZAAR) 50 MG tablet Take 100 mg by mouth Daily.      metFORMIN (GLUCOPHAGE) 500 MG tablet Take 500 mg by mouth 2 (Two) Times a Day With Meals.      metoprolol tartrate (LOPRESSOR) 50 MG tablet Take 50 mg by mouth 2 (Two) Times a Day.      montelukast (SINGULAIR) 10 MG tablet Take 10 mg by mouth Every Night.      pantoprazole (PROTONIX) 20 MG EC tablet Take 20 mg by mouth Daily.      traZODone (DESYREL) 100 MG tablet Take 100 mg by mouth Every Night.         PROCEDURES  Procedures    PROGRESS, DATA  ANALYSIS, CONSULTS, AND MEDICAL DECISION MAKING  All labs have been independently interpreted by me.  All radiology studies have been reviewed by me. All EKG's have been independently viewed and interpreted by me.  Discussion below represents my analysis of pertinent findings related to patient's condition, differential diagnosis, treatment plan and final disposition.    Differential diagnosis includes but is not limited to intoxication, dehydration, hypotension, intracranial hemorrhage, stroke, drug derangement, UTI.    Clinical Scores:                   ED Course as of 05/21/25 1826   Wed May 21, 2025   1527 Gave the patient Narcan with minimal response.  Still lethargic but speech is somewhat more fluent.  He does state that he takes pain medication but only at night.  He denies taking any extra last night or taking any today. [AB]   1527 EKG interpreted by myself  Time: 1514  Rate: 71  Rhythm: NSR  No ST elevation or depression  Normal intervals  Normal morphology [AB]   1546 WBC: 5.78 [AB]   1546 Hemoglobin(!): 12.6 [AB]   1546 CT Head Without Contrast  My independent interpretation of the imaging study is no ICH [AB]   1637 BUN(!): 33 [AB]   1637 Creatinine(!): 2.69 [AB]   1637 Ethanol: <10 [AB]   1637 Magnesium(!): 2.5 [AB]   1637 proBNP: 379.0 [AB]   1637 HS Troponin T(!): 51 [AB]   1639 At time of discharge from outside facility was 1.8 [AB]   1712 Updated patient on results and indication for admission.  He is agreeable.  He is 1 to provide a urine sample. [AB]   1807 HS Troponin T(!): 41 [AB]   1807 Troponin T Numeric Delta: -10 [AB]   1821 Urinalysis With Microscopic If Indicated (No Culture) - Urine, Clean Catch(!) [AB]   1822 MDM: Patient presents with altered mental status and borderline hypotension.  Improved after IV fluids.  Found to have MARLON superimposed on CKD.  Patient also received Narcan which did improve his mental status.  Plan to admit for further observation and lab recheck. [AB]   1825  Phone call with Dr. Melendrez.  Discussed the patient, relevant history, exam, diagnostics, ED findings/progress, and concerns. They agree to admit the patient to telemetry. Care assumed by the admitting physician at this time. [AB]      ED Course User Index  [AB] Vidal Chew MD             AS OF 18:26 EDT VITALS:    BP - 118/67  HR - 70  TEMP - 97.6 °F (36.4 °C) (Oral)  O2 SATS - 98%    COMPLEXITY OF CARE  The patient requires admission.      DIAGNOSIS  Final diagnoses:   Acute kidney injury superimposed on chronic kidney disease   Altered mental status, unspecified altered mental status type   Hypotension, unspecified hypotension type         DISPOSITION  ED Disposition       ED Disposition   Decision to Admit    Condition   --    Comment   Level of Care: Telemetry [5]   Diagnosis: MARLON (acute kidney injury) [[id number]]   Admitting Physician: JAIME MELENDREZ [[id number]]   Attending Physician: JAIME MELENDREZ [[id number]]   Is patient appropriate for Inpatient Observation Unit?: No [0]                  Please note that portions of this document were completed with a voice recognition program.    Note Disclaimer: At [facility], we believe that sharing information builds trust and better relationships. You are receiving this note because you recently visited [facility]. It is possible you will see health information before a provider has talked with you about it. This kind of information can be easy to misunderstand. To help you fully understand what it means for your health, we urge you to discuss this note with your provider.         Vidal Chew MD  05/21/25 4518

## 2025-05-22 ENCOUNTER — APPOINTMENT (OUTPATIENT)
Dept: ULTRASOUND IMAGING | Facility: HOSPITAL | Age: 65
DRG: 683 | End: 2025-05-22
Payer: MEDICARE

## 2025-05-22 PROBLEM — N17.9 ACUTE KIDNEY INJURY: Status: ACTIVE | Noted: 2025-05-22

## 2025-05-22 LAB
ANION GAP SERPL CALCULATED.3IONS-SCNC: 8 MMOL/L (ref 5–15)
BASOPHILS # BLD AUTO: 0.01 10*3/MM3 (ref 0–0.2)
BASOPHILS NFR BLD AUTO: 0.2 % (ref 0–1.5)
BUN SERPL-MCNC: 26 MG/DL (ref 8–23)
BUN/CREAT SERPL: 13.1 (ref 7–25)
CALCIUM SPEC-SCNC: 8.6 MG/DL (ref 8.6–10.5)
CHLORIDE SERPL-SCNC: 104 MMOL/L (ref 98–107)
CO2 SERPL-SCNC: 26 MMOL/L (ref 22–29)
CREAT SERPL-MCNC: 1.98 MG/DL (ref 0.76–1.27)
CREAT UR-MCNC: 207.7 MG/DL
DEPRECATED RDW RBC AUTO: 48.8 FL (ref 37–54)
EGFRCR SERPLBLD CKD-EPI 2021: 36.8 ML/MIN/1.73
EOSINOPHIL # BLD AUTO: 0.03 10*3/MM3 (ref 0–0.4)
EOSINOPHIL NFR BLD AUTO: 0.5 % (ref 0.3–6.2)
ERYTHROCYTE [DISTWIDTH] IN BLOOD BY AUTOMATED COUNT: 14.3 % (ref 12.3–15.4)
GLUCOSE BLDC GLUCOMTR-MCNC: 100 MG/DL (ref 70–130)
GLUCOSE BLDC GLUCOMTR-MCNC: 112 MG/DL (ref 70–130)
GLUCOSE BLDC GLUCOMTR-MCNC: 130 MG/DL (ref 70–130)
GLUCOSE BLDC GLUCOMTR-MCNC: 55 MG/DL (ref 70–130)
GLUCOSE BLDC GLUCOMTR-MCNC: 84 MG/DL (ref 70–130)
GLUCOSE SERPL-MCNC: 96 MG/DL (ref 65–99)
HCT VFR BLD AUTO: 32.3 % (ref 37.5–51)
HGB BLD-MCNC: 11.2 G/DL (ref 13–17.7)
IMM GRANULOCYTES # BLD AUTO: 0.03 10*3/MM3 (ref 0–0.05)
IMM GRANULOCYTES NFR BLD AUTO: 0.5 % (ref 0–0.5)
LYMPHOCYTES # BLD AUTO: 0.69 10*3/MM3 (ref 0.7–3.1)
LYMPHOCYTES NFR BLD AUTO: 12.3 % (ref 19.6–45.3)
MCH RBC QN AUTO: 32.7 PG (ref 26.6–33)
MCHC RBC AUTO-ENTMCNC: 34.7 G/DL (ref 31.5–35.7)
MCV RBC AUTO: 94.2 FL (ref 79–97)
MONOCYTES # BLD AUTO: 0.66 10*3/MM3 (ref 0.1–0.9)
MONOCYTES NFR BLD AUTO: 11.8 % (ref 5–12)
NEUTROPHILS NFR BLD AUTO: 4.19 10*3/MM3 (ref 1.7–7)
NEUTROPHILS NFR BLD AUTO: 74.7 % (ref 42.7–76)
NRBC BLD AUTO-RTO: 0 /100 WBC (ref 0–0.2)
PLATELET # BLD AUTO: 183 10*3/MM3 (ref 140–450)
PMV BLD AUTO: 11.8 FL (ref 6–12)
POTASSIUM SERPL-SCNC: 4.6 MMOL/L (ref 3.5–5.2)
PROT ?TM UR-MCNC: 24.5 MG/DL
RBC # BLD AUTO: 3.43 10*6/MM3 (ref 4.14–5.8)
SODIUM SERPL-SCNC: 138 MMOL/L (ref 136–145)
SODIUM UR-SCNC: 27 MMOL/L
WBC NRBC COR # BLD AUTO: 5.61 10*3/MM3 (ref 3.4–10.8)

## 2025-05-22 PROCEDURE — 97165 OT EVAL LOW COMPLEX 30 MIN: CPT

## 2025-05-22 PROCEDURE — 76775 US EXAM ABDO BACK WALL LIM: CPT

## 2025-05-22 PROCEDURE — 82948 REAGENT STRIP/BLOOD GLUCOSE: CPT

## 2025-05-22 PROCEDURE — 25810000003 SODIUM CHLORIDE 0.9 % SOLUTION: Performed by: HOSPITALIST

## 2025-05-22 PROCEDURE — 97162 PT EVAL MOD COMPLEX 30 MIN: CPT

## 2025-05-22 PROCEDURE — 97530 THERAPEUTIC ACTIVITIES: CPT

## 2025-05-22 PROCEDURE — 85025 COMPLETE CBC W/AUTO DIFF WBC: CPT | Performed by: HOSPITALIST

## 2025-05-22 PROCEDURE — 80048 BASIC METABOLIC PNL TOTAL CA: CPT | Performed by: HOSPITALIST

## 2025-05-22 RX ORDER — DEXTROSE MONOHYDRATE 25 G/50ML
25 INJECTION, SOLUTION INTRAVENOUS
Status: DISCONTINUED | OUTPATIENT
Start: 2025-05-22 | End: 2025-05-22

## 2025-05-22 RX ORDER — CARVEDILOL 12.5 MG/1
12.5 TABLET ORAL 2 TIMES DAILY WITH MEALS
Status: DISCONTINUED | OUTPATIENT
Start: 2025-05-22 | End: 2025-05-26 | Stop reason: HOSPADM

## 2025-05-22 RX ORDER — SODIUM CHLORIDE 9 MG/ML
75 INJECTION, SOLUTION INTRAVENOUS CONTINUOUS
Status: ACTIVE | OUTPATIENT
Start: 2025-05-22 | End: 2025-05-23

## 2025-05-22 RX ORDER — ISOSORBIDE MONONITRATE 30 MG/1
30 TABLET, EXTENDED RELEASE ORAL
Status: DISCONTINUED | OUTPATIENT
Start: 2025-05-22 | End: 2025-05-26 | Stop reason: HOSPADM

## 2025-05-22 RX ORDER — ASPIRIN 81 MG/1
81 TABLET, CHEWABLE ORAL DAILY
Status: DISCONTINUED | OUTPATIENT
Start: 2025-05-22 | End: 2025-05-26 | Stop reason: HOSPADM

## 2025-05-22 RX ORDER — IBUPROFEN 600 MG/1
1 TABLET ORAL
Status: DISCONTINUED | OUTPATIENT
Start: 2025-05-22 | End: 2025-05-22

## 2025-05-22 RX ORDER — INSULIN LISPRO 100 [IU]/ML
2-7 INJECTION, SOLUTION INTRAVENOUS; SUBCUTANEOUS
Status: DISCONTINUED | OUTPATIENT
Start: 2025-05-22 | End: 2025-05-26 | Stop reason: HOSPADM

## 2025-05-22 RX ORDER — MONTELUKAST SODIUM 10 MG/1
10 TABLET ORAL NIGHTLY
Status: DISCONTINUED | OUTPATIENT
Start: 2025-05-22 | End: 2025-05-26 | Stop reason: HOSPADM

## 2025-05-22 RX ORDER — LEVETIRACETAM 500 MG/1
500 TABLET ORAL 2 TIMES DAILY
Status: DISCONTINUED | OUTPATIENT
Start: 2025-05-22 | End: 2025-05-26 | Stop reason: HOSPADM

## 2025-05-22 RX ORDER — TRAZODONE HYDROCHLORIDE 100 MG/1
100 TABLET ORAL NIGHTLY
Status: DISCONTINUED | OUTPATIENT
Start: 2025-05-22 | End: 2025-05-26 | Stop reason: HOSPADM

## 2025-05-22 RX ORDER — NICOTINE POLACRILEX 4 MG
15 LOZENGE BUCCAL
Status: DISCONTINUED | OUTPATIENT
Start: 2025-05-22 | End: 2025-05-22

## 2025-05-22 RX ORDER — ALLOPURINOL 100 MG/1
100 TABLET ORAL DAILY
Status: DISCONTINUED | OUTPATIENT
Start: 2025-05-22 | End: 2025-05-26 | Stop reason: HOSPADM

## 2025-05-22 RX ORDER — NALOXONE HCL 0.4 MG/ML
0.4 VIAL (ML) INJECTION ONCE
Status: DISCONTINUED | OUTPATIENT
Start: 2025-05-22 | End: 2025-05-22

## 2025-05-22 RX ORDER — CLOPIDOGREL BISULFATE 75 MG/1
75 TABLET ORAL DAILY
Status: DISCONTINUED | OUTPATIENT
Start: 2025-05-22 | End: 2025-05-26 | Stop reason: HOSPADM

## 2025-05-22 RX ORDER — AMLODIPINE BESYLATE 10 MG/1
10 TABLET ORAL DAILY
Status: DISCONTINUED | OUTPATIENT
Start: 2025-05-22 | End: 2025-05-26 | Stop reason: HOSPADM

## 2025-05-22 RX ORDER — FAMOTIDINE 20 MG/1
20 TABLET, FILM COATED ORAL 2 TIMES DAILY
Status: DISCONTINUED | OUTPATIENT
Start: 2025-05-22 | End: 2025-05-26 | Stop reason: HOSPADM

## 2025-05-22 RX ORDER — ATORVASTATIN CALCIUM 10 MG/1
10 TABLET, FILM COATED ORAL NIGHTLY
Status: DISCONTINUED | OUTPATIENT
Start: 2025-05-22 | End: 2025-05-26 | Stop reason: HOSPADM

## 2025-05-22 RX ADMIN — ASPIRIN 81 MG CHEWABLE TABLET 81 MG: 81 TABLET CHEWABLE at 08:10

## 2025-05-22 RX ADMIN — ATORVASTATIN CALCIUM 10 MG: 10 TABLET ORAL at 08:10

## 2025-05-22 RX ADMIN — CLOPIDOGREL BISULFATE 75 MG: 75 TABLET, FILM COATED ORAL at 12:41

## 2025-05-22 RX ADMIN — FAMOTIDINE 20 MG: 20 TABLET, FILM COATED ORAL at 21:00

## 2025-05-22 RX ADMIN — CARVEDILOL 12.5 MG: 12.5 TABLET, FILM COATED ORAL at 08:10

## 2025-05-22 RX ADMIN — ISOSORBIDE MONONITRATE 30 MG: 30 TABLET, EXTENDED RELEASE ORAL at 08:10

## 2025-05-22 RX ADMIN — SODIUM CHLORIDE 75 ML/HR: 9 INJECTION, SOLUTION INTRAVENOUS at 00:36

## 2025-05-22 RX ADMIN — FAMOTIDINE 20 MG: 20 TABLET, FILM COATED ORAL at 08:10

## 2025-05-22 RX ADMIN — MONTELUKAST 10 MG: 10 TABLET, FILM COATED ORAL at 21:00

## 2025-05-22 RX ADMIN — SODIUM CHLORIDE 75 ML/HR: 9 INJECTION, SOLUTION INTRAVENOUS at 14:35

## 2025-05-22 RX ADMIN — AMLODIPINE BESYLATE 10 MG: 10 TABLET ORAL at 08:09

## 2025-05-22 RX ADMIN — LEVETIRACETAM 500 MG: 500 TABLET, FILM COATED ORAL at 12:42

## 2025-05-22 RX ADMIN — TRAZODONE HYDROCHLORIDE 100 MG: 100 TABLET ORAL at 20:59

## 2025-05-22 RX ADMIN — ALLOPURINOL 100 MG: 100 TABLET ORAL at 12:42

## 2025-05-22 RX ADMIN — CARVEDILOL 12.5 MG: 12.5 TABLET, FILM COATED ORAL at 18:40

## 2025-05-22 RX ADMIN — LEVETIRACETAM 500 MG: 500 TABLET, FILM COATED ORAL at 20:59

## 2025-05-22 RX ADMIN — ATORVASTATIN CALCIUM 10 MG: 10 TABLET ORAL at 21:06

## 2025-05-22 NOTE — CONSULTS
[facility]                                                                                             Nephrology Initial Consult Note    Patient Identification:  Name: Eric Simms MRN: [id number]  Age: 65 y.o. : 1960  Sex: male  Date:2025    Requesting Physician: As per consult order.  Reason for Consultation: MARLON on CKD  Information from:patient/ family/ chart      History of Present Illness: This is a 65 y.o. year old male with history of chronic kidney disease, originally seen by nephrology in  in a another city.  He was seen by our group in  and again in  but states he does not follow with a nephrologist on a regular basis.  Recent admission to [facility] for MARLON on CKD with hyperkalemia, improved with medical management.  He presented to the ER yesterday afternoon with altered mental status.  Medical history otherwise significant for hypertension, diabetes and CKD.  Remains a poor historian.  EMS was called from a parking lot where patient was found to be unresponsive.  He was initially hypotensive, denied any acute complaints at that time and states he feels well today.  No nausea vomiting diarrhea or dysuria.  Baseline creatinine looks to be around 1.2.  He was at 2.69 yesterday and 1.98 today.    The following medical history and medications personally reviewed by me:    Problem List:     MARLON (acute kidney injury)      Past Medical History:  Past Medical History:   Diagnosis Date    Coronary artery disease     Diabetes mellitus     GERD (gastroesophageal reflux disease)     Hyperlipidemia     Hypertension     Peripheral neuropathy     Seizures     Stroke        Past Surgical History:  History reviewed. No pertinent surgical history.     Home Meds:   Medications Prior to Admission   Medication Sig Dispense Refill Last Dose/Taking    allopurinol (ZYLOPRIM) 100 MG tablet Take 100  mg by mouth Daily.       amLODIPine (NORVASC) 10 MG tablet Take 1 tablet by mouth Daily.       atorvastatin (LIPITOR) 10 MG tablet Take 10 mg by mouth Daily.       chlorthalidone (HYGROTON) 25 MG tablet Take 25 mg by mouth Daily.       clopidogrel (PLAVIX) 75 MG tablet Take 1 tablet by mouth Daily.       gabapentin (NEURONTIN) 400 MG capsule Take 800 mg by mouth 3 (Three) Times a Day As Needed.       HYDROcodone-acetaminophen (NORCO) 7.5-325 MG per tablet Take 1 tablet by mouth Every 8 (Eight) Hours As Needed for Moderate Pain.       insulin glargine (LANTUS, SEMGLEE) 100 UNIT/ML injection Inject 75 Units under the skin into the appropriate area as directed Every Night.       Insulin Lispro (humaLOG) 100 UNIT/ML injection Inject 10 Units under the skin into the appropriate area as directed 3 (Three) Times a Day Before Meals.       isosorbide mononitrate (IMDUR) 120 MG 24 hr tablet Take 120 mg by mouth Daily.       levETIRAcetam (KEPPRA) 500 MG tablet Take 1 tablet by mouth 2 (Two) Times a Day.       losartan (COZAAR) 50 MG tablet Take 100 mg by mouth Daily.       metFORMIN (GLUCOPHAGE) 500 MG tablet Take 500 mg by mouth 2 (Two) Times a Day With Meals.       metoprolol tartrate (LOPRESSOR) 50 MG tablet Take 50 mg by mouth 2 (Two) Times a Day.       montelukast (SINGULAIR) 10 MG tablet Take 10 mg by mouth Every Night.       pantoprazole (PROTONIX) 20 MG EC tablet Take 20 mg by mouth Daily.       traZODone (DESYREL) 100 MG tablet Take 100 mg by mouth Every Night.          Current Meds:   Current Facility-Administered Medications   Medication Dose Route Frequency Provider Last Rate Last Admin    allopurinol (ZYLOPRIM) tablet 100 mg  100 mg Oral Daily Atilio Melendrez MD   100 mg at 05/22/25 1242    amLODIPine (NORVASC) tablet 10 mg  10 mg Oral Daily Atilio Melendrez MD   10 mg at 05/22/25 0809    aspirin chewable tablet 81 mg  81 mg Oral Daily Atilio Melendrez MD   81 mg at 05/22/25 0810    atorvastatin (LIPITOR) tablet 10 mg   10 mg Oral Nightly Atilio Melendrez MD   10 mg at 05/22/25 0810    carvedilol (COREG) tablet 12.5 mg  12.5 mg Oral BID With Meals Atilio Melendrez MD   12.5 mg at 05/22/25 0810    clopidogrel (PLAVIX) tablet 75 mg  75 mg Oral Daily Atilio Melendrez MD   75 mg at 05/22/25 1241    famotidine (PEPCID) tablet 20 mg  20 mg Oral BID Atilio Melendrez MD   20 mg at 05/22/25 0810    insulin lispro (HUMALOG/ADMELOG) injection 2-7 Units  2-7 Units Subcutaneous 4x Daily AC & at Bedtime Atilio Melendrez MD        isosorbide mononitrate (IMDUR) 24 hr tablet 30 mg  30 mg Oral Q24H Atilio Melendrez MD   30 mg at 05/22/25 0810    levETIRAcetam (KEPPRA) tablet 500 mg  500 mg Oral BID Atilio Melendrez MD   500 mg at 05/22/25 1242    montelukast (SINGULAIR) tablet 10 mg  10 mg Oral Nightly Atilio Melendrez MD        sodium chloride 0.9 % infusion  75 mL/hr Intravenous Continuous Atilio Melendrez MD 75 mL/hr at 05/22/25 0517 75 mL/hr at 05/22/25 0517    traZODone (DESYREL) tablet 100 mg  100 mg Oral Nightly Atilio Melendrez MD           Allergies:  No Known Allergies    Social History:   Social History     Socioeconomic History    Marital status:    Tobacco Use    Smoking status: Never    Smokeless tobacco: Never   Vaping Use    Vaping status: Never Used   Substance and Sexual Activity    Alcohol use: Not Currently    Drug use: Not Currently     Comment: crack - last use 15 years ago    Sexual activity: Defer        Family History:  History reviewed. No pertinent family history.     Review of Systems: as per HPI, in addition:    General:      + Weakness / fatigue,                       No fevers / chills                       no weight loss  HEENT;       no dysphagia   Neck:           No swelling  Respiratory: no cough / congestion/wheezing                      No shortness of air   CV:              No chest pain                       No palpitations  Abdomen/GI: no nausea / vomiting                      No diarrhea, no constipation                      No  "abdominal pain  :             no dysuria  Endocrine:   No heat or cold intolerance  Skin:           Denies rashes or skin lesions   Vascular:   No edema  Musculoskeletal: No joint pain or deformities      Physical Exam:  Vitals:   Temp (24hrs), Av °F (36.7 °C), Min:97.2 °F (36.2 °C), Max:99.7 °F (37.6 °C)    /48 (BP Location: Right arm, Patient Position: Lying)   Pulse 83   Temp 99.7 °F (37.6 °C) (Oral)   Resp 16   Ht 165.1 cm (65\")   Wt 88 kg (194 lb 0.1 oz)   SpO2 99%   BMI 32.28 kg/m²   Intake/Output:     Intake/Output Summary (Last 24 hours) at 2025 1432  Last data filed at 2025 1239  Gross per 24 hour   Intake 1120 ml   Output 1850 ml   Net -730 ml        Wt Readings from Last 1 Encounters:   25 2331 88 kg (194 lb 0.1 oz)   25 1508 84.2 kg (185 lb 10 oz)       Exam:    General Appearance:  Awake, alert, no acute distress  Mildly ill-appearing   Head and Face:  Normocephalic, atraumatic   Eyes:  No icterus   ENMT: Moist mucosa   Neck: Supple  no jugular venous distention   Pulmonary:  Respiratory effort: Normal  Auscultation of lungs: Clear bilaterally  No wheezes or rhonchi  Good air movement, good expansion   Chest wall:  No tenderness or deformity   Cardiovascular:  Auscultation of the heart: Normal rhythm, no murmurs  Trace edema of bilateral lower extremities   Abdomen:  Abdomen: soft, nontender, normal bowel sounds    Musculoskeletal: No joint swelling or gross deformities    Skin: Skin inspection and palpation: No rash   Lymphatic: No focal lymphadenopathy   Psychiatric: Judgement and insight: normal  Orientation to person place and time: normal  Mood and affect: normal       DATA:  Radiology and Labs:  The following labs independently reviewed by me, additional AM labs ordered  Old records independently reviewed showing CKD baseline creatinine around 1.3  The following radiologic studies independently viewed by me, findings chest x-ray with borderline cardiomegaly " and clear lungs  Interval notes, chart personally reviewed by me.  I have reviewed and summarized old records as detailed above  Plan of care discussed with patient himself at bedside  New problems include MARLON on CKD      Risk/ complexity of medical care/ medical decision making: High complexity, MARLON on CKD  Chronic illness with severe exacerbation or progression: CKD and hypertension      Labs:   Recent Results (from the past 24 hours)   ECG 12 Lead Altered Mental Status    Collection Time: 05/21/25  3:14 PM   Result Value Ref Range    QT Interval 426 ms    QTC Interval 463 ms   Comprehensive Metabolic Panel    Collection Time: 05/21/25  3:27 PM    Specimen: Blood   Result Value Ref Range    Glucose 162 (H) 65 - 99 mg/dL    BUN 33 (H) 8 - 23 mg/dL    Creatinine 2.69 (H) 0.76 - 1.27 mg/dL    Sodium 132 (L) 136 - 145 mmol/L    Potassium 4.7 3.5 - 5.2 mmol/L    Chloride 95 (L) 98 - 107 mmol/L    CO2 25.0 22.0 - 29.0 mmol/L    Calcium 9.1 8.6 - 10.5 mg/dL    Total Protein 7.5 6.0 - 8.5 g/dL    Albumin 4.3 3.5 - 5.2 g/dL    ALT (SGPT) 27 1 - 41 U/L    AST (SGOT) 31 1 - 40 U/L    Alkaline Phosphatase 52 39 - 117 U/L    Total Bilirubin 0.5 0.0 - 1.2 mg/dL    Globulin 3.2 gm/dL    A/G Ratio 1.3 g/dL    BUN/Creatinine Ratio 12.3 7.0 - 25.0    Anion Gap 12.0 5.0 - 15.0 mmol/L    eGFR 25.5 (L) >60.0 mL/min/1.73   BNP    Collection Time: 05/21/25  3:27 PM    Specimen: Blood   Result Value Ref Range    proBNP 379.0 0.0 - 900.0 pg/mL   High Sensitivity Troponin T    Collection Time: 05/21/25  3:27 PM    Specimen: Blood   Result Value Ref Range    HS Troponin T 51 (H) <22 ng/L   Ethanol    Collection Time: 05/21/25  3:27 PM    Specimen: Blood   Result Value Ref Range    Ethanol <10 0 - 10 mg/dL    Ethanol % <0.010 %   Magnesium    Collection Time: 05/21/25  3:27 PM    Specimen: Blood   Result Value Ref Range    Magnesium 2.5 (H) 1.6 - 2.4 mg/dL   Phosphorus    Collection Time: 05/21/25  3:27 PM    Specimen: Blood   Result Value  Ref Range    Phosphorus 2.9 2.5 - 4.5 mg/dL   CBC Auto Differential    Collection Time: 05/21/25  3:27 PM    Specimen: Blood   Result Value Ref Range    WBC 5.78 3.40 - 10.80 10*3/mm3    RBC 3.95 (L) 4.14 - 5.80 10*6/mm3    Hemoglobin 12.6 (L) 13.0 - 17.7 g/dL    Hematocrit 37.2 (L) 37.5 - 51.0 %    MCV 94.2 79.0 - 97.0 fL    MCH 31.9 26.6 - 33.0 pg    MCHC 33.9 31.5 - 35.7 g/dL    RDW 13.1 12.3 - 15.4 %    RDW-SD 45.5 37.0 - 54.0 fl    MPV 11.9 6.0 - 12.0 fL    Platelets 189 140 - 450 10*3/mm3    Neutrophil % 66.7 42.7 - 76.0 %    Lymphocyte % 16.4 (L) 19.6 - 45.3 %    Monocyte % 15.4 (H) 5.0 - 12.0 %    Eosinophil % 0.5 0.3 - 6.2 %    Basophil % 0.3 0.0 - 1.5 %    Immature Grans % 0.7 (H) 0.0 - 0.5 %    Neutrophils, Absolute 3.85 1.70 - 7.00 10*3/mm3    Lymphocytes, Absolute 0.95 0.70 - 3.10 10*3/mm3    Monocytes, Absolute 0.89 0.10 - 0.90 10*3/mm3    Eosinophils, Absolute 0.03 0.00 - 0.40 10*3/mm3    Basophils, Absolute 0.02 0.00 - 0.20 10*3/mm3    Immature Grans, Absolute 0.04 0.00 - 0.05 10*3/mm3    nRBC 0.0 0.0 - 0.2 /100 WBC   High Sensitivity Troponin T 1Hr    Collection Time: 05/21/25  4:58 PM    Specimen: Blood   Result Value Ref Range    HS Troponin T 41 (H) <22 ng/L    Troponin T Numeric Delta -10 ng/L    Troponin T % Delta -20 Abnormal if >/= 20%   Urinalysis With Microscopic If Indicated (No Culture) - Urine, Clean Catch    Collection Time: 05/21/25  6:01 PM    Specimen: Urine, Clean Catch   Result Value Ref Range    Color, UA Dark Yellow (A) Yellow, Straw    Appearance, UA Cloudy (A) Clear    pH, UA <=5.0 5.0 - 8.0    Specific Gravity, UA 1.019 1.005 - 1.030    Glucose, UA >=1000 mg/dL (3+) (A) Negative    Ketones, UA Trace (A) Negative    Bilirubin, UA Negative Negative    Blood, UA Negative Negative    Protein, UA Trace (A) Negative    Leuk Esterase, UA Negative Negative    Nitrite, UA Negative Negative    Urobilinogen, UA 1.0 E.U./dL 0.2 - 1.0 E.U./dL   Urine Drug Screen - Urine, Clean Catch     Collection Time: 05/21/25  6:01 PM    Specimen: Urine, Clean Catch   Result Value Ref Range    THC, Screen, Urine Negative Negative    Phencyclidine (PCP), Urine Negative Negative    Cocaine Screen, Urine Negative Negative    Methamphetamine, Ur Negative Negative    Opiate Screen Positive (A) Negative    Amphetamine Screen, Urine Negative Negative    Benzodiazepine Screen, Urine Negative Negative    Tricyclic Antidepressants Screen Negative Negative    Methadone Screen, Urine Negative Negative    Barbiturates Screen, Urine Negative Negative    Oxycodone Screen, Urine Negative Negative    Buprenorphine, Screen, Urine Negative Negative   Fentanyl, Urine - Urine, Clean Catch    Collection Time: 05/21/25  6:01 PM    Specimen: Urine, Clean Catch   Result Value Ref Range    Fentanyl, Urine Negative Negative   POC Glucose Once    Collection Time: 05/22/25  6:05 AM    Specimen: Blood   Result Value Ref Range    Glucose 55 (L) 70 - 130 mg/dL   POC Glucose Once    Collection Time: 05/22/25  6:40 AM    Specimen: Blood   Result Value Ref Range    Glucose 84 70 - 130 mg/dL   Basic Metabolic Panel    Collection Time: 05/22/25  7:29 AM    Specimen: Blood   Result Value Ref Range    Glucose 96 65 - 99 mg/dL    BUN 26 (H) 8 - 23 mg/dL    Creatinine 1.98 (H) 0.76 - 1.27 mg/dL    Sodium 138 136 - 145 mmol/L    Potassium 4.6 3.5 - 5.2 mmol/L    Chloride 104 98 - 107 mmol/L    CO2 26.0 22.0 - 29.0 mmol/L    Calcium 8.6 8.6 - 10.5 mg/dL    BUN/Creatinine Ratio 13.1 7.0 - 25.0    Anion Gap 8.0 5.0 - 15.0 mmol/L    eGFR 36.8 (L) >60.0 mL/min/1.73   CBC Auto Differential    Collection Time: 05/22/25  7:29 AM    Specimen: Blood   Result Value Ref Range    WBC 5.61 3.40 - 10.80 10*3/mm3    RBC 3.43 (L) 4.14 - 5.80 10*6/mm3    Hemoglobin 11.2 (L) 13.0 - 17.7 g/dL    Hematocrit 32.3 (L) 37.5 - 51.0 %    MCV 94.2 79.0 - 97.0 fL    MCH 32.7 26.6 - 33.0 pg    MCHC 34.7 31.5 - 35.7 g/dL    RDW 14.3 12.3 - 15.4 %    RDW-SD 48.8 37.0 - 54.0 fl    MPV  11.8 6.0 - 12.0 fL    Platelets 183 140 - 450 10*3/mm3    Neutrophil % 74.7 42.7 - 76.0 %    Lymphocyte % 12.3 (L) 19.6 - 45.3 %    Monocyte % 11.8 5.0 - 12.0 %    Eosinophil % 0.5 0.3 - 6.2 %    Basophil % 0.2 0.0 - 1.5 %    Immature Grans % 0.5 0.0 - 0.5 %    Neutrophils, Absolute 4.19 1.70 - 7.00 10*3/mm3    Lymphocytes, Absolute 0.69 (L) 0.70 - 3.10 10*3/mm3    Monocytes, Absolute 0.66 0.10 - 0.90 10*3/mm3    Eosinophils, Absolute 0.03 0.00 - 0.40 10*3/mm3    Basophils, Absolute 0.01 0.00 - 0.20 10*3/mm3    Immature Grans, Absolute 0.03 0.00 - 0.05 10*3/mm3    nRBC 0.0 0.0 - 0.2 /100 WBC   POC Glucose Once    Collection Time: 05/22/25 11:15 AM    Specimen: Blood   Result Value Ref Range    Glucose 130 70 - 130 mg/dL       Radiology:  Imaging Results (Last 24 Hours)       Procedure Component Value Units Date/Time    CT Head Without Contrast [[id number]] Collected: 05/21/25 1723     Updated: 05/21/25 1733    Narrative:      HEAD CT WITHOUT CONTRAST     REASON:  ams, lethargy, recent stroke      COMPARISON STUDIES:  None Available.     TECHNIQUE:  Axial images were acquired from the skull base to vertex  without contrast, including multiplanar reformats, per standard  departmental protocol.    Radiation dose reduction techniques were  utilized, including automated exposure control, and exposure modulation  based on body size.     FINDINGS:     There is no CT evidence of acute intracranial hemorrhage, mass, or  infarct. There is volume loss, but there is no evidence of hydrocephalus  or extra-axial fluid collection.     There are nonspecific white matter changes, and there is left frontal  and left parieto-occipital cortical encephalomalacia, but there is no  evidence of acute intracranial abnormality.     Skull base, calvarium, and extracranial soft tissues show no acute  abnormality.       Impression:         Chronic changes as noted above, no acute intracranial abnormality.                        This report was  finalized on 5/21/2025 5:30 PM by Dr. Rodney King M.D on Workstation: [id number]       XR Chest 1 View [[id number]] Collected: 05/21/25 1629     Updated: 05/21/25 1632    Narrative:      XR CHEST 1 VW-5/21/2025     HISTORY: Altered mental status.     Heart size is at the upper limits of normal. Lungs appear clear but are  slightly underinflated. There is some aortic calcification.       Impression:      1. Borderline cardiomegaly.  2. Lungs appear clear.        This report was finalized on 5/21/2025 4:29 PM by Dr. Andriy Man M.D on Workstation: [id number]                  ASSESSMENT:   MARLON, prerenal with hypotension contributing.  Improving with fluids  CKD stage IIIa, baseline creatinine around 1.3  Hypotension, better with fluids  Diabetes mellitus  Altered mental status    MARLON (acute kidney injury)  Anemia of CKD      DISCUSSION/PLAN:   Renal function is improving with IV fluids  Oral intake still low today.  Will continue IV fluids another 24 hours  Spent a while since he had a renal ultrasound.  Will recheck  Check urine studies, quantify proteinuria  Hold chlorthalidone   monitor electrolytes and volume closely and avoid any potential nephrotoxins during period of renal recovery    I appreciate the consult request.  Please send me a secure chat message with any nonurgent questions regarding patient care.  For any urgent patient care issues please call my office number below.      Rick Stallworth MD  [facility]  Office phone number: [phone]  Answering service phone number: [phone]      5/22/2025        Dictation via Dragon dictation software

## 2025-05-22 NOTE — THERAPY EVALUATION
Patient Name: Eric Simms  : 1960    MRN: [id number]                              Today's Date: 2025       Admit Date: 2025    Visit Dx:     ICD-10-CM ICD-9-CM   1. Acute kidney injury superimposed on chronic kidney disease  N17.9 584.9    N18.9 585.9   2. Altered mental status, unspecified altered mental status type  R41.82 780.97   3. Hypotension, unspecified hypotension type  I95.9 458.9     Patient Active Problem List   Diagnosis    Chest pain, unspecified type    Type 2 diabetes mellitus with hyperglycemia    HTN (hypertension)    Obesity (BMI 30-39.9)    Anemia, chronic disease    CKD (chronic kidney disease) stage 2, GFR 60-89 ml/min    Seizure disorder    MALRON (acute kidney injury)     Past Medical History:   Diagnosis Date    Coronary artery disease     Diabetes mellitus     GERD (gastroesophageal reflux disease)     Hyperlipidemia     Hypertension     Peripheral neuropathy     Seizures     Stroke      History reviewed. No pertinent surgical history.   General Information       Row Name 25 1124          Physical Therapy Time and Intention    Document Type evaluation  -MS     Mode of Treatment physical therapy;individual therapy  -MS       Row Name 25 1124          General Information    Patient Profile Reviewed yes  -MS     Prior Level of Function independent:  Use of cane for ambulation prior to admission per pt. report  -MS     Existing Precautions/Restrictions fall   Exit alarm  -MS     Barriers to Rehab none identified  -MS       Row Name 25 1124          Cognition    Orientation Status (Cognition) oriented x 3  -MS       Row Name 25 1124          Safety Issues/Impairments Affecting Functional Mobility    Comment, Safety Issues/Impairments (Mobility) Gait belt used for safety.  -MS               User Key  (r) = Recorded By, (t) = Taken By, (c) = Cosigned By      Initials Name Provider Type    Sergio Hutchison, PT Physical Therapist                   Mobility        Row Name 05/22/25 1125          Bed Mobility    Bed Mobility supine-sit;sit-supine  -MS     Supine-Sit [place] (Bed Mobility) contact guard  -MS       Row Name 05/22/25 1125          Sit-Stand Transfer    Sit-Stand [place] (Transfers) contact guard  -MS     Assistive Device (Sit-Stand Transfers) walker, front-wheeled  -MS       Row Name 05/22/25 1125          Gait/Stairs (Locomotion)    [place] Level (Gait) contact guard  -MS     Assistive Device (Gait) walker, front-wheeled  -MS     Distance in Feet (Gait) 100  -MS     Deviations/Abnormal Patterns (Gait) zenia decreased  -MS     Bilateral Gait Deviations forward flexed posture  -MS     Comment, (Gait/Stairs) Verbal/tactile cues given for posture correction.  -MS               User Key  (r) = Recorded By, (t) = Taken By, (c) = Cosigned By      Initials Name Provider Type    Sergio Hutchison, PT Physical Therapist                   Obj/Interventions       Row Name 05/22/25 1126          Range of Motion Comprehensive    Comment, General Range of Motion BLE (WFL's)  -MS       Row Name 05/22/25 1126          Strength Comprehensive (MMT)    Comment, General Manual Muscle Testing (MMT) Assessment BLE (3+/5)  -MS       Row Name 05/22/25 1126          Motor Skills    Therapeutic Exercise --  BLE ther. ex program x 10 reps completed (Hip Flexion, LAQ's)  -MS               User Key  (r) = Recorded By, (t) = Taken By, (c) = Cosigned By      Initials Name Provider Type    Sergio Hutchison, PT Physical Therapist                   Goals/Plan       Row Name 05/22/25 1127          Bed Mobility Goal 1 (PT)    Activity/Assistive Device (Bed Mobility Goal 1, PT) bed mobility activities, all  -MS     [place] Level/Cues Needed (Bed Mobility Goal 1, PT) independent  -MS     Time Frame (Bed Mobility Goal 1, PT) long term goal (LTG);1 week  -MS       Row Name 05/22/25 1127          Transfer Goal 1 (PT)    Activity/Assistive Device (Transfer Goal 1, PT)  transfers, all  With AAD  -MS     [place] Level/Cues Needed (Transfer Goal 1, PT) independent  -MS     Time Frame (Transfer Goal 1, PT) long term goal (LTG);1 week  -MS       Row Name 05/22/25 1127          Gait Training Goal 1 (PT)    Activity/Assistive Device (Gait Training Goal 1, PT) gait (walking locomotion)  With AAD  -MS     [place] Level (Gait Training Goal 1, PT) independent  -MS     Distance (Gait Training Goal 1, PT) 150 feet  -MS     Time Frame (Gait Training Goal 1, PT) long term goal (LTG);1 week  -MS       Row Name 05/22/25 1127          Therapy Assessment/Plan (PT)    Planned Therapy Interventions (PT) balance training;bed mobility training;gait training;home exercise program;patient/family education;postural re-education;transfer training;strengthening  -MS               User Key  (r) = Recorded By, (t) = Taken By, (c) = Cosigned By      Initials Name Provider Type    Sergio Hutchison, PT Physical Therapist                   Clinical Impression       Row Name 05/22/25 1126          Pain    Pretreatment Pain Rating 3/10  -MS     Posttreatment Pain Rating 3/10  -MS     Pain Location head  -MS     Pain Management Interventions nursing notified  -MS     Pre/Posttreatment Pain Comment Pt. reports having a headache this AM.  -MS       Row Name 05/22/25 1126          Plan of Care Review    Plan of Care Reviewed With patient  -MS       Row Name 05/22/25 1126          Therapy Assessment/Plan (PT)    Rehab Potential (PT) good  -MS     Criteria for Skilled Interventions Met (PT) skilled treatment is necessary  -MS     Therapy Frequency (PT) 6 times/wk  -MS       Row Name 05/22/25 1126          Positioning and Restraints    Pre-Treatment Position in bed  -MS     Post Treatment Position bed  -MS     In Bed notified nsg;sitting EOB;call light within reach;encouraged to call for assist;exit alarm on;with OT  All lines intact.  -MS               User Key  (r) = Recorded By, (t) = Taken By, (c) =  Cosigned By      Initials Name Provider Type    Sergio Hutchison JESUS ALBERTO, PT Physical Therapist                   Outcome Measures       Row Name 05/22/25 1128 05/21/25 5589       How much help from another person do you currently need...    Turning from your back to your side while in flat bed without using bedrails? 3  -MS 4  -DM    Moving from lying on back to sitting on the side of a flat bed without bedrails? 3  -MS 3  -DM    Moving to and from a bed to a chair (including a wheelchair)? 3  -MS 2  -DM    Standing up from a chair using your arms (e.g., wheelchair, bedside chair)? 3  -MS 2  -DM    Climbing 3-5 steps with a railing? 3  -MS 2  -DM    To walk in hospital room? 3  -MS 2  -DM    AM-PAC 6 Clicks Score (PT) 18  -MS 15  -DM    Highest Level of Mobility Goal Walk 10 Steps or More-6  -MS Move to Chair/Commode-4  -DM      Row Name 05/22/25 1021          Modified [place] Scale    Modified [place] Scale 0 - No Symptoms at all.  -MM       Row Name 05/22/25 1128 05/22/25 1021       Functional Assessment    Outcome Measure Options AM-PAC 6 Clicks Basic Mobility (PT)  -MS AM-PAC 6 Clicks Daily Activity (OT);Modified [place]  -MM              User Key  (r) = Recorded By, (t) = Taken By, (c) = Cosigned By      Initials Name Provider Type    MS Tilley Sergio GRANDA, PT Physical Therapist    MM Nuha Rosas OT Occupational Therapist    Nixon Begum, RN Registered Nurse                                 Physical Therapy Education       Title: PT OT SLP Therapies (Done)       Topic: Physical Therapy (Done)       Point: Mobility training (Done)       Learning Progress Summary            Patient Acceptance, E,D, VU,NR by MS at 5/22/2025 1128                      Point: Home exercise program (Done)       Learning Progress Summary            Patient Acceptance, E,D, VU,NR by MS at 5/22/2025 1128                      Point: Body mechanics (Done)       Learning Progress Summary            Patient Acceptance, E,D, VU,NR by  MS at 5/22/2025 1128                      Point: Precautions (Done)       Learning Progress Summary            Patient Acceptance, E,D, VU,NR by MS at 5/22/2025 1128                                      User Key       Initials Effective Dates Name Provider Type Discipline    MS 06/16/21 -  Sergio Tilley, PT Physical Therapist PT                  PT Recommendation and Plan  Planned Therapy Interventions (PT): balance training, bed mobility training, gait training, home exercise program, patient/family education, postural re-education, transfer training, strengthening  Outcome Evaluation: Pt. is a 65 year old Male admitted to the hospital after being found unresponsive on a wheelchair bus.  Per chart review, pt. with AMS, hypotension, and an acute kidney injury this hospital admission.  Pt. reports that prior to admitting symptoms he was using a cane for ambulation.  Pt. currently presents with decreased strength, decreased balance, and decreased tolerance to functional activity.  This AM, pt. able to ambulate 100 feet, CGA x 1, with use of a Rwx.  Pt. requires CGA x 1 for bed mobility and CGA x 1 for sit <-> stand transfers.  BLE ther. ex. program x 10 reps completed for general strengthening.  Verbal/tactile cues given during ambulation for posture correction.  Pt. will benefit from skilled inpt. P.T. to address his functional deficits and to assist pt. in regaining his maximum level of independence with functional mobility.     Time Calculation:         PT Charges       Row Name 05/22/25 1133             Time Calculation    Start Time 0810  -MS      Stop Time 0826  -MS      Time Calculation (min) 16 min  -MS      PT Received On 05/22/25  -MS      PT - Next Appointment 05/23/25  -MS      PT Goal Re-Cert Due Date 05/29/25  -MS         Time Calculation- PT    Total Timed Code Minutes- PT 15 minute(s)  -MS                User Key  (r) = Recorded By, (t) = Taken By, (c) = Cosigned By      Initials Name Provider Type     Sergio Hutchison, PT Physical Therapist                  Therapy Charges for Today       Code Description Service Date Service Provider Modifiers Qty    89942622960 HC PT EVAL MOD COMPLEXITY 3 5/22/2025 Sergio Tilley, PT GP 1    41389075658 HC PT THERAPEUTIC ACT EA 15 MIN 5/22/2025 Sergio Tilley, PT GP 1            PT G-Codes  Outcome Measure Options: AM-PAC 6 Clicks Basic Mobility (PT)  AM-PAC 6 Clicks Score (PT): 18  AM-PAC 6 Clicks Score (OT): 24  Modified Amairani Scale: 0 - No Symptoms at all.  PT Discharge Summary  Anticipated Discharge Disposition (PT): home with assist, home with home health    Sergio Tilley, PT  5/22/2025

## 2025-05-22 NOTE — THERAPY EVALUATION
Patient Name: Eric Simms  : 1960    MRN: [id number]                              Today's Date: 2025       Admit Date: 2025    Visit Dx:     ICD-10-CM ICD-9-CM   1. Acute kidney injury superimposed on chronic kidney disease  N17.9 584.9    N18.9 585.9   2. Altered mental status, unspecified altered mental status type  R41.82 780.97   3. Hypotension, unspecified hypotension type  I95.9 458.9     Patient Active Problem List   Diagnosis    Chest pain, unspecified type    Type 2 diabetes mellitus with hyperglycemia    HTN (hypertension)    Obesity (BMI 30-39.9)    Anemia, chronic disease    CKD (chronic kidney disease) stage 2, GFR 60-89 ml/min    Seizure disorder    MARLON (acute kidney injury)     Past Medical History:   Diagnosis Date    Coronary artery disease     Diabetes mellitus     GERD (gastroesophageal reflux disease)     Hyperlipidemia     Hypertension     Peripheral neuropathy     Seizures     Stroke      History reviewed. No pertinent surgical history.   General Information       Row Name 25 1014          OT Time and Intention    Document Type discharge evaluation/summary;evaluation  -MM     Mode of Treatment individual therapy;occupational therapy  -MM     Patient Effort good  -MM       Row Name 25 1014          General Information    Patient Profile Reviewed yes  -MM     Prior Level of Function independent:;ADL's;transfer  use of rwx/cane and owns w/c  -MM     Existing Precautions/Restrictions no known precautions/restrictions  -MM     Barriers to Rehab medically complex  -MM       Row Name 25 1014          Living Environment    Current Living Arrangements home  -MM       Row Name 25 1014          Cognition    Orientation Status (Cognition) oriented x 3  difficulty with speech projection  -MM               User Key  (r) = Recorded By, (t) = Taken By, (c) = Cosigned By      Initials Name Provider Type    Nuha Newsome OT Occupational Therapist                      Mobility/ADL's       Row Name 05/22/25 1017          Bed Mobility    Comment, (Bed Mobility) seated at EOB on arrival  -MM       Row Name 05/22/25 1017          Transfers    Transfers sit-stand transfer;stand-sit transfer  -MM       Row Name 05/22/25 1017          Sit-Stand Transfer    Sit-Stand [place] (Transfers) standby assist  -MM       Row Name 05/22/25 1017          Stand-Sit Transfer    Stand-Sit [place] (Transfers) standby assist  -MM       Row Name 05/22/25 1017          Functional Mobility    Functional Mobility- Ind. Level contact guard assist  -MM     Functional Mobility- Device walker, front-wheeled  -MM     Functional Mobility- Comment sba/cga for func mob within room, demo's good safety awareness  -MM       Row Name 05/22/25 1017          Activities of Daily Living    BADL Assessment/Intervention lower body dressing;feeding  -MM       Row Name 05/22/25 1017          Lower Body Dressing Assessment/Training    [place] Level (Lower Body Dressing) socks  -     Comment, (Lower Body Dressing) simulation with don/doffing, no concerns  -MM       Row Name 05/22/25 1017          Self-Feeding Assessment/Training    [place] Level (Feeding) feeding skills;independent  -               User Key  (r) = Recorded By, (t) = Taken By, (c) = Cosigned By      Initials Name Provider Type    Nuha Newsome OT Occupational Therapist                   Obj/Interventions       Row Name 05/22/25 1018          Sensory Assessment (Somatosensory)    Sensory Assessment (Somatosensory) UE sensation intact  -MM       Row Name 05/22/25 1018          Vision Assessment/Intervention    Visual Impairment/Limitations WFL  -MM       Row Name 05/22/25 1018          Range of Motion Comprehensive    General Range of Motion bilateral upper extremity ROM WNL  -MM       Row Name 05/22/25 1018          Strength Comprehensive (MMT)    General Manual Muscle Testing (MMT) Assessment upper extremity strength deficits  identified  -MM     Comment, General Manual Muscle Testing (MMT) Assessment BUE 5/5 MMT  -MM       Row Name 05/22/25 1018          Motor Skills    Motor Skills coordination  -MM     Coordination WFL  -MM       Row Name 05/22/25 1018          Balance    Comment, Balance no balance deficits noted in sitting/standing  -MM               User Key  (r) = Recorded By, (t) = Taken By, (c) = Cosigned By      Initials Name Provider Type    MM Nuah Rosas OT Occupational Therapist                   Goals/Plan       Row Name 05/22/25 1021          Transfer Goal 1 (OT)    Activity/Assistive Device (Transfer Goal 1, OT) sit-to-stand/stand-to-sit  -MM     [place] Level/Cues Needed (Transfer Goal 1, OT) standby assist  -MM     Time Frame (Transfer Goal 1, OT) short term goal (STG);1 day  -MM     Progress/Outcome (Transfer Goal 1, OT) goal met  -MM               User Key  (r) = Recorded By, (t) = Taken By, (c) = Cosigned By      Initials Name Provider Type    MM Nuha Rosas OT Occupational Therapist                   Clinical Impression       Row Name 05/22/25 1019          Pain Assessment    Pretreatment Pain Rating 0/10 - no pain  -MM     Posttreatment Pain Rating 0/10 - no pain  -MM       Row Name 05/22/25 1019          Plan of Care Review    Plan of Care Reviewed With patient  -MM     Progress no change  -MM     Outcome Evaluation pt is a 66 yo male admitted after being found unresponsive on a wheelchair bus by bystanders. Reported stroke in the last week however no deficits noted this date, hx DM. He is seen this date for OT LISA bernabe&Ox4, reports he lives with significant other, (I) with ADLs, use of cane mostly for func mob. He presents near his baseline this date, able to simulate LBD task with mod (I), STS with SBA and SBA/CGA for household distance within room with rwx support. UE strength noted 5/5 this date. Pt has no concerns about d/c home with significant other once medically approp. No further skilled OT  needs at this time.  -MM       Row Name 05/22/25 1019          Therapy Assessment/Plan (OT)    Therapy Frequency (OT) evaluation only  -MM       Row Name 05/22/25 1019          Therapy Plan Review/Discharge Plan (OT)    Anticipated Discharge Disposition (OT) home  -MM       Row Name 05/22/25 1019          Vital Signs    O2 Delivery Intra Treatment room air  -MM       Row Name 05/22/25 1019          Positioning and Restraints    Pre-Treatment Position in bed  -MM     Post Treatment Position chair  -MM     In Chair notified nsg;reclined;call light within reach;encouraged to call for assist;exit alarm on  -MM               User Key  (r) = Recorded By, (t) = Taken By, (c) = Cosigned By      Initials Name Provider Type    Nuha Newsome OT Occupational Therapist                   Outcome Measures       Row Name 05/22/25 1021          How much help from another is currently needed...    Putting on and taking off regular lower body clothing? 4  -MM     Bathing (including washing, rinsing, and drying) 4  -MM     Toileting (which includes using toilet bed pan or urinal) 4  -MM     Putting on and taking off regular upper body clothing 4  -MM     Taking care of personal grooming (such as brushing teeth) 4  -MM     Eating meals 4  -MM     AM-PAC 6 Clicks Score (OT) 24  -MM       Row Name 05/21/25 2299          How much help from another person do you currently need...    Turning from your back to your side while in flat bed without using bedrails? 4  -DM     Moving from lying on back to sitting on the side of a flat bed without bedrails? 3  -DM     Moving to and from a bed to a chair (including a wheelchair)? 2  -DM     Standing up from a chair using your arms (e.g., wheelchair, bedside chair)? 2  -DM     Climbing 3-5 steps with a railing? 2  -DM     To walk in hospital room? 2  -DM     AM-PAC 6 Clicks Score (PT) 15  -DM       Row Name 05/22/25 1021          Modified Amairani Scale    Modified [place] Scale 0 - No Symptoms at  all.  -MM       Row Name 05/22/25 1021          Functional Assessment    Outcome Measure Options AM-PAC 6 Clicks Daily Activity (OT);Modified [place]  -               User Key  (r) = Recorded By, (t) = Taken By, (c) = Cosigned By      Initials Name Provider Type    MM Nuha Rosas OT Occupational Therapist    Nixon Begum, RN Registered Nurse                    Occupational Therapy Education       Title: PT OT SLP Therapies (Done)       Topic: Occupational Therapy (Done)       Point: ADL training (Done)       Learning Progress Summary            Patient Acceptance, E, VU by  at 5/22/2025 1022    Comment: role of OT                      Point: Precautions (Done)       Learning Progress Summary            Patient Acceptance, E, VU by  at 5/22/2025 1022    Comment: role of OT                      Point: Body mechanics (Done)       Learning Progress Summary            Patient Acceptance, E, VU by  at 5/22/2025 1022    Comment: role of OT                                      User Key       Initials Effective Dates Name Provider Type Discipline     05/31/24 -  Nuha Rosas OT Occupational Therapist OT                  OT Recommendation and Plan  Therapy Frequency (OT): evaluation only  Plan of Care Review  Plan of Care Reviewed With: patient  Progress: no change  Outcome Evaluation: pt is a 66 yo male admitted after being found unresponsive on a wheelchair bus by bystanders. Reported stroke in the last week however no deficits noted this date, hx DM. He is seen this date for OT LISA bernabe&Ox4, reports he lives with significant other, (I) with ADLs, use of cane mostly for func mob. He presents near his baseline this date, able to simulate LBD task with mod (I), STS with SBA and SBA/CGA for household distance within room with rwx support. UE strength noted 5/5 this date. Pt has no concerns about d/c home with significant other once medically approp. No further skilled OT needs at this time.     Time  Calculation:   Evaluation Complexity (OT)  Review Occupational Profile/Medical/Therapy History Complexity: brief/low complexity  Clinical Decision Making Complexity (OT): problem focused assessment/low complexity  Overall Complexity of Evaluation (OT): low complexity     Time Calculation- OT       Row Name 05/22/25 1022             Time Calculation- OT    OT Start Time 0824  -MM      OT Stop Time 0839  -MM      OT Time Calculation (min) 15 min  -MM      OT Received On 05/22/25  -MM         Untimed Charges    OT Eval/Re-eval Minutes 15  -MM         Total Minutes    Untimed Charges Total Minutes 15  -MM       Total Minutes 15  -MM                User Key  (r) = Recorded By, (t) = Taken By, (c) = Cosigned By      Initials Name Provider Type    MM Nuha Rosas OT Occupational Therapist                  Therapy Charges for Today       Code Description Service Date Service Provider Modifiers Qty    85287372639 HC OT EVAL LOW COMPLEXITY 3 5/22/2025 Nuha Rosas OT GO 1                 Nuha Rosas OT  5/22/2025

## 2025-05-22 NOTE — PLAN OF CARE
Goal Outcome Evaluation:  Plan of Care Reviewed With: patient           Outcome Evaluation: Pt. is a 65 year old Male admitted to the hospital after being found unresponsive on a wheelchair bus.  Per chart review, pt. with AMS, hypotension, and an acute kidney injury this hospital admission.  Pt. reports that prior to admitting symptoms he was using a cane for ambulation.  Pt. currently presents with decreased strength, decreased balance, and decreased tolerance to functional activity.  This AM, pt. able to ambulate 100 feet, CGA x 1, with use of a Rwx.  Pt. requires CGA x 1 for bed mobility and CGA x 1 for sit <-> stand transfers.  BLE ther. ex. program x 10 reps completed for general strengthening.  Verbal/tactile cues given during ambulation for posture correction.  Pt. will benefit from skilled inpt. P.T. to address his functional deficits and to assist pt. in regaining his maximum level of independence with functional mobility.    Anticipated Discharge Disposition (PT): home with assist, home with home health

## 2025-05-22 NOTE — PLAN OF CARE
Goal Outcome Evaluation:  Plan of Care Reviewed With: patient        Progress: no change  Outcome Evaluation: pt is a 64 yo male admitted after being found unresponsive on a wheelchair bus by bystanders. Reported stroke in the last week however no deficits noted this date, hx DM. He is seen this date for OT LISA bernabe&Ox4, reports he lives with significant other, (I) with ADLs, use of cane mostly for func mob. He presents near his baseline this date, able to simulate LBD task with mod (I), STS with SBA and SBA/CGA for household distance within room with rwx support. UE strength noted 5/5 this date. Pt has no concerns about d/c home with significant other once medically approp. No further skilled OT needs at this time.    Anticipated Discharge Disposition (OT): home

## 2025-05-22 NOTE — PLAN OF CARE
Goal Outcome Evaluation:  Plan of Care Reviewed With: patient        Progress: no change  Outcome Evaluation: pt is AOx2, RA (episodes of dropping when asleep oxygen set up at bedside), asssitx1 to chair, urinal within reach, call light within reach, bed alarm on.

## 2025-05-22 NOTE — H&P
"History and physical    Primary care physician      Chief complaint  Altered mental status    History of present illness  65-year-old male with history of diabetes hypertension CVA coronary artery disease chronic kidney disease who was recently discharged from [facility] presented to [facility] emergency room with altered mental status.  Patient evaluated in ER found to have acute kidney injury admitted for management.  At the time of examination he is awake and alert and give me a good history but dysarthric which he said is baseline and denies any headache dizziness chest pain shortness of breath abdominal pain nausea vomiting diarrhea.    PAST MEDICAL HISTORY   Coronary artery disease      Diabetes mellitus      Gastroesophageal reflux disease      Hyperlipidemia      Hypertension      Peripheral neuropathy      Seizures      Stroke        PAST SURGICAL HISTORY     History reviewed. No pertinent surgical history.     FAMILY HISTORY  History reviewed. No pertinent family history.     SOCIAL HISTORY                 Socioeconomic History    Marital status:    Tobacco Use    Smoking status: Never    Smokeless tobacco: Never   Vaping Use    Vaping status: Never Used   Substance and Sexual Activity    Alcohol use: Not Currently    Drug use: Not Currently       Comment: crack - last use 15 years ago    Sexual activity: Defer         ALLERGIES  Patient has no known allergies.  Home medications reviewed     REVIEW OF SYSTEMS  All systems reviewed and negative except for those discussed in HPI.     PHYSICAL EXAM   Blood pressure 132/48, pulse 83, temperature 99.7 °F (37.6 °C), temperature source Oral, resp. rate 16, height 165.1 cm (65\"), weight 88 kg (194 lb 0.1 oz), SpO2 99%.    Constitutional:       General: He is not in acute distress.     Appearance: Normal appearance. He is not ill-appearing, toxic-appearing or diaphoretic.   HENT:      Head: Normocephalic and atraumatic.      Nose: Nose " normal.      Mouth/Throat:      Mouth: Mucous membranes are dry.   Eyes:      Extraocular Movements: Extraocular movements intact.      Conjunctiva/sclera: Conjunctivae normal.      Pupils: Pupils are equal, round, and reactive to light.   Cardiovascular:      Rate and Rhythm: Normal rate and regular rhythm.      Pulses: Normal pulses.      Heart sounds: Normal heart sounds. No murmur heard.     No friction rub. No gallop.   Pulmonary:      Effort: Pulmonary effort is normal. No respiratory distress.      Breath sounds: Normal breath sounds. No stridor. No wheezing, rhonchi or rales.   Abdominal:      General: Abdomen is flat. There is no distension.      Palpations: Abdomen is soft.      Tenderness: There is abdominal tenderness  There is no guarding or rebound.   Musculoskeletal:      Cervical back: Normal range of motion and neck supple.   Skin:     General: Skin is warm and dry.      Capillary Refill: Capillary refill takes less than 2 seconds.   Neurological:      General: No focal deficit present.      Mental Status: He is oriented to person, place, and time. Mental status is at baseline.   Psychiatric:         Mood and Affect: Mood normal.         Behavior: Behavior normal.         Thought Content: Thought content normal.         Judgment: Judgment normal.      LAB RESULTS  Lab Results (last 24 hours)       Procedure Component Value Units Date/Time    POC Glucose Once [[id number]]  (Normal) Collected: 05/22/25 1115    Specimen: Blood Updated: 05/22/25 1118     Glucose 130 mg/dL     Basic Metabolic Panel [[id number]]  (Abnormal) Collected: 05/22/25 0729    Specimen: Blood Updated: 05/22/25 0809     Glucose 96 mg/dL      BUN 26 mg/dL      Creatinine 1.98 mg/dL      Sodium 138 mmol/L      Potassium 4.6 mmol/L      Chloride 104 mmol/L      CO2 26.0 mmol/L      Calcium 8.6 mg/dL      BUN/Creatinine Ratio 13.1     Anion Gap 8.0 mmol/L      eGFR 36.8 mL/min/1.73     Narrative:      GFR Categories in Chronic Kidney  Disease (CKD)              GFR Category          GFR (mL/min/1.73)    Interpretation  G1                    90 or greater        Normal or high (1)  G2                    60-89                Mild decrease (1)  G3a                   45-59                Mild to moderate decrease  G3b                   30-44                Moderate to severe decrease  G4                    15-29                Severe decrease  G5                    14 or less           Kidney failure    (1)In the absence of evidence of kidney disease, neither GFR category G1 or G2 fulfill the criteria for CKD.    eGFR calculation 2021 CKD-EPI creatinine equation, which does not include race as a factor    CBC & Differential [[id number]]  (Abnormal) Collected: 05/22/25 0729    Specimen: Blood Updated: 05/22/25 0749    Narrative:      The following orders were created for panel order CBC & Differential.  Procedure                               Abnormality         Status                     ---------                               -----------         ------                     CBC Auto Differential[[id number]]        Abnormal            Final result                 Please view results for these tests on the individual orders.    CBC Auto Differential [[id number]]  (Abnormal) Collected: 05/22/25 0729    Specimen: Blood Updated: 05/22/25 0749     WBC 5.61 10*3/mm3      RBC 3.43 10*6/mm3      Hemoglobin 11.2 g/dL      Hematocrit 32.3 %      MCV 94.2 fL      MCH 32.7 pg      MCHC 34.7 g/dL      RDW 14.3 %      RDW-SD 48.8 fl      MPV 11.8 fL      Platelets 183 10*3/mm3      Neutrophil % 74.7 %      Lymphocyte % 12.3 %      Monocyte % 11.8 %      Eosinophil % 0.5 %      Basophil % 0.2 %      Immature Grans % 0.5 %      Neutrophils, Absolute 4.19 10*3/mm3      Lymphocytes, Absolute 0.69 10*3/mm3      Monocytes, Absolute 0.66 10*3/mm3      Eosinophils, Absolute 0.03 10*3/mm3      Basophils, Absolute 0.01 10*3/mm3      Immature Grans, Absolute 0.03 10*3/mm3       nRBC 0.0 /100 WBC     POC Glucose Once [[id number]]  (Normal) Collected: 05/22/25 0640    Specimen: Blood Updated: 05/22/25 0642     Glucose 84 mg/dL     POC Glucose Once [[id number]]  (Abnormal) Collected: 05/22/25 0605    Specimen: Blood Updated: 05/22/25 0608     Glucose 55 mg/dL     Fentanyl, Urine - Urine, Clean Catch [[id number]]  (Normal) Collected: 05/21/25 1801    Specimen: Urine, Clean Catch Updated: 05/21/25 1832     Fentanyl, Urine Negative    Narrative:      Negative Threshold:      Fentanyl 5 ng/mL     The normal value for the drug tested is negative. This report includes final unconfirmed screening results to be used for medical treatment purposes only. Unconfirmed results must not be used for non-medical purposes such as employment or legal testing. Clinical consideration should be applied to any drug of abuse test, particularly when unconfirmed results are used.           Urine Drug Screen - Urine, Clean Catch [[id number]]  (Abnormal) Collected: 05/21/25 1801    Specimen: Urine, Clean Catch Updated: 05/21/25 1823     THC, Screen, Urine Negative     Phencyclidine (PCP), Urine Negative     Cocaine Screen, Urine Negative     Methamphetamine, Ur Negative     Opiate Screen Positive     Amphetamine Screen, Urine Negative     Benzodiazepine Screen, Urine Negative     Tricyclic Antidepressants Screen Negative     Methadone Screen, Urine Negative     Barbiturates Screen, Urine Negative     Oxycodone Screen, Urine Negative     Buprenorphine, Screen, Urine Negative    Narrative:      Cutoff For Drugs Screened:    Amphetamines               500 ng/ml  Barbiturates               200 ng/ml  Benzodiazepines            150 ng/ml  Cocaine                    150 ng/ml  Methadone                  200 ng/ml  Opiates                    100 ng/ml  Phencyclidine               25 ng/ml  THC                         50 ng/ml  Methamphetamine            500 ng/ml  Tricyclic Antidepressants  300 ng/ml  Oxycodone                   100 ng/ml  Buprenorphine               10 ng/ml    The normal value for all drugs tested is negative. This report includes unconfirmed screening results, with the cutoff values listed, to be used for medical treatment purposes only.  Unconfirmed results must not be used for non-medical purposes such as employment or legal testing.  Clinical consideration should be applied to any drug of abuse test, particularly when unconfirmed results are used.      Urinalysis With Microscopic If Indicated (No Culture) - Urine, Clean Catch [[id number]]  (Abnormal) Collected: 05/21/25 1801    Specimen: Urine, Clean Catch Updated: 05/21/25 1821     Color, UA Dark Yellow     Appearance, UA Cloudy     pH, UA <=5.0     Specific Gravity, UA 1.019     Glucose, UA >=1000 mg/dL (3+)     Ketones, UA Trace     Bilirubin, UA Negative     Blood, UA Negative     Protein, UA Trace     Leuk Esterase, UA Negative     Nitrite, UA Negative     Urobilinogen, UA 1.0 E.U./dL    Narrative:      Urine microscopic not indicated.    High Sensitivity Troponin T 1Hr [[id number]]  (Abnormal) Collected: 05/21/25 1658    Specimen: Blood Updated: 05/21/25 1756     HS Troponin T 41 ng/L      Troponin T Numeric Delta -10 ng/L      Troponin T % Delta -20    Narrative:      High Sensitive Troponin T Reference Range:  <14.0 ng/L- Negative Female for AMI  <22.0 ng/L- Negative Male for AMI  >=14 - Abnormal Female indicating possible myocardial injury.  >=22 - Abnormal Male indicating possible myocardial injury.   Clinicians would have to utilize clinical acumen, EKG, Troponin, and serial changes to determine if it is an Acute Myocardial Infarction or myocardial injury due to an underlying chronic condition.         Comprehensive Metabolic Panel [[id number]]  (Abnormal) Collected: 05/21/25 1527    Specimen: Blood Updated: 05/21/25 1559     Glucose 162 mg/dL      BUN 33 mg/dL      Creatinine 2.69 mg/dL      Sodium 132 mmol/L      Potassium 4.7 mmol/L      Chloride 95 mmol/L       CO2 25.0 mmol/L      Calcium 9.1 mg/dL      Total Protein 7.5 g/dL      Albumin 4.3 g/dL      ALT (SGPT) 27 U/L      AST (SGOT) 31 U/L      Alkaline Phosphatase 52 U/L      Total Bilirubin 0.5 mg/dL      Globulin 3.2 gm/dL      A/G Ratio 1.3 g/dL      BUN/Creatinine Ratio 12.3     Anion Gap 12.0 mmol/L      eGFR 25.5 mL/min/1.73     Narrative:      GFR Categories in Chronic Kidney Disease (CKD)              GFR Category          GFR (mL/min/1.73)    Interpretation  G1                    90 or greater        Normal or high (1)  G2                    60-89                Mild decrease (1)  G3a                   45-59                Mild to moderate decrease  G3b                   30-44                Moderate to severe decrease  G4                    15-29                Severe decrease  G5                    14 or less           Kidney failure    (1)In the absence of evidence of kidney disease, neither GFR category G1 or G2 fulfill the criteria for CKD.    eGFR calculation 2021 CKD-EPI creatinine equation, which does not include race as a factor    BNP [[id number]]  (Normal) Collected: 05/21/25 1527    Specimen: Blood Updated: 05/21/25 1559     proBNP 379.0 pg/mL     Narrative:      This assay is used as an aid in the diagnosis of individuals suspected of having heart failure. It can be used as an aid in the diagnosis of acute decompensated heart failure (ADHF) in patients presenting with signs and symptoms of ADHF to the emergency department (ED). In addition, NT-proBNP of <300 pg/mL indicates ADHF is not likely.    Age Range Result Interpretation  NT-proBNP Concentration (pg/mL:      <50             Positive            >450                   Gray                 300-450                    Negative             <300    50-75           Positive            >900                  Gray                300-900                  Negative            <300      >75             Positive            >1800                  Lopez                 300-1800                  Negative            <300    High Sensitivity Troponin T [[id number]]  (Abnormal) Collected: 05/21/25 1527    Specimen: Blood Updated: 05/21/25 1559     HS Troponin T 51 ng/L     Narrative:      High Sensitive Troponin T Reference Range:  <14.0 ng/L- Negative Female for AMI  <22.0 ng/L- Negative Male for AMI  >=14 - Abnormal Female indicating possible myocardial injury.  >=22 - Abnormal Male indicating possible myocardial injury.   Clinicians would have to utilize clinical acumen, EKG, Troponin, and serial changes to determine if it is an Acute Myocardial Infarction or myocardial injury due to an underlying chronic condition.         Ethanol [[id number]] Collected: 05/21/25 1527    Specimen: Blood Updated: 05/21/25 1559     Ethanol <10 mg/dL      Ethanol % <0.010 %     Magnesium [[id number]]  (Abnormal) Collected: 05/21/25 1527    Specimen: Blood Updated: 05/21/25 1559     Magnesium 2.5 mg/dL     Phosphorus [[id number]]  (Normal) Collected: 05/21/25 1527    Specimen: Blood Updated: 05/21/25 1559     Phosphorus 2.9 mg/dL     CBC & Differential [[id number]]  (Abnormal) Collected: 05/21/25 1527    Specimen: Blood Updated: 05/21/25 1537    Narrative:      The following orders were created for panel order CBC & Differential.  Procedure                               Abnormality         Status                     ---------                               -----------         ------                     CBC Auto Differential[[id number]]        Abnormal            Final result                 Please view results for these tests on the individual orders.    CBC Auto Differential [[id number]]  (Abnormal) Collected: 05/21/25 1527    Specimen: Blood Updated: 05/21/25 1537     WBC 5.78 10*3/mm3      RBC 3.95 10*6/mm3      Hemoglobin 12.6 g/dL      Hematocrit 37.2 %      MCV 94.2 fL      MCH 31.9 pg      MCHC 33.9 g/dL      RDW 13.1 %      RDW-SD 45.5 fl      MPV 11.9 fL      Platelets 189 10*3/mm3       Neutrophil % 66.7 %      Lymphocyte % 16.4 %      Monocyte % 15.4 %      Eosinophil % 0.5 %      Basophil % 0.3 %      Immature Grans % 0.7 %      Neutrophils, Absolute 3.85 10*3/mm3      Lymphocytes, Absolute 0.95 10*3/mm3      Monocytes, Absolute 0.89 10*3/mm3      Eosinophils, Absolute 0.03 10*3/mm3      Basophils, Absolute 0.02 10*3/mm3      Immature Grans, Absolute 0.04 10*3/mm3      nRBC 0.0 /100 WBC           Imaging Results (Last 24 Hours)       Procedure Component Value Units Date/Time    CT Head Without Contrast [[id number]] Collected: 05/21/25 1723     Updated: 05/21/25 1733    Narrative:      HEAD CT WITHOUT CONTRAST     REASON:  ams, lethargy, recent stroke      COMPARISON STUDIES:  None Available.     TECHNIQUE:  Axial images were acquired from the skull base to vertex  without contrast, including multiplanar reformats, per standard  departmental protocol.    Radiation dose reduction techniques were  utilized, including automated exposure control, and exposure modulation  based on body size.     FINDINGS:     There is no CT evidence of acute intracranial hemorrhage, mass, or  infarct. There is volume loss, but there is no evidence of hydrocephalus  or extra-axial fluid collection.     There are nonspecific white matter changes, and there is left frontal  and left parieto-occipital cortical encephalomalacia, but there is no  evidence of acute intracranial abnormality.     Skull base, calvarium, and extracranial soft tissues show no acute  abnormality.       Impression:         Chronic changes as noted above, no acute intracranial abnormality.                        This report was finalized on 5/21/2025 5:30 PM by Dr. Rodney King M.D on Workstation: [id number]       XR Chest 1 View [[id number]] Collected: 05/21/25 1629     Updated: 05/21/25 1632    Narrative:      XR CHEST 1 VW-5/21/2025     HISTORY: Altered mental status.     Heart size is at the upper limits of normal. Lungs appear clear but  are  slightly underinflated. There is some aortic calcification.       Impression:      1. Borderline cardiomegaly.  2. Lungs appear clear.        This report was finalized on 5/21/2025 4:29 PM by Dr. Andriy Man M.D on Workstation: [id number]             [facility] 5[facility]21/2025 15[facility]         Author: -- Service: -- Author Type: --   Filed: Date of Service: Creation Time:   Status: (Other)   HEART RATE=71  bpm  RR Mbpedtoj=481  ms  ME Fbfttity=726  ms  P Horizontal Axis=4  deg  P Front Axis=25  deg  QRSD Interval=93  ms  QT Tlypztcv=213  ms  KWaL=020  ms  QRS Axis=-21  deg  T Wave Axis=33  deg  - OTHERWISE NORMAL ECG -  Sinus rhythm  Borderline  left axis deviation  Abnormal R-wave progression, early transition  No change from prior tracing          Current Facility-Administered Medications:     allopurinol (ZYLOPRIM) tablet 100 mg, 100 mg, Oral, Daily, Atilio Melendrez MD, 100 mg at 05/22/25 1242    amLODIPine (NORVASC) tablet 10 mg, 10 mg, Oral, Daily, Atilio Melendrez MD, 10 mg at 05/22/25 0809    aspirin chewable tablet 81 mg, 81 mg, Oral, Daily, Atilio Melendrez MD, 81 mg at 05/22/25 0810    atorvastatin (LIPITOR) tablet 10 mg, 10 mg, Oral, Nightly, Atilio Melendrez MD, 10 mg at 05/22/25 0810    carvedilol (COREG) tablet 12.5 mg, 12.5 mg, Oral, BID With Meals, Atilio Melendrez MD, 12.5 mg at 05/22/25 0810    clopidogrel (PLAVIX) tablet 75 mg, 75 mg, Oral, Daily, Atilio Melendrez MD, 75 mg at 05/22/25 1241    famotidine (PEPCID) tablet 20 mg, 20 mg, Oral, BID, Atilio Melendrez MD, 20 mg at 05/22/25 0810    insulin lispro (HUMALOG/ADMELOG) injection 2-7 Units, 2-7 Units, Subcutaneous, 4x Daily AC & at Bedtime, Atilio Melendrez MD    isosorbide mononitrate (IMDUR) 24 hr tablet 30 mg, 30 mg, Oral, Q24H, Atilio Melendrez MD, 30 mg at 05/22/25 0810    levETIRAcetam (KEPPRA) tablet 500 mg, 500 mg, Oral, BID, Atilio Melendrez MD, 500 mg at 05/22/25 1242    montelukast (SINGULAIR) tablet 10 mg, 10 mg, Oral, Nightly,  Jaime Melendrez MD    sodium chloride 0.9 % infusion, 75 mL/hr, Intravenous, Continuous, Jaime Melendrez MD, Last Rate: 75 mL/hr at 05/22/25 1435, 75 mL/hr at 05/22/25 1435    traZODone (DESYREL) tablet 100 mg, 100 mg, Oral, Nightly, Jaime Melendrez MD     ASSESSMENT  Acute kidney injury   Diabetes mellitus  Hypertension  Hyperlipidemia  Seizure disorder  History of CVA with  dysarthria  Depression  Chronic kidney disease stage III  Gastroesophageal reflux disease    PLAN  Admit  IVF  Hold chlorthalidone Cozaar and Glucophage  Change Protonix to Pepcid  Nephrology consult  Adjust home medications  Stress ulcer DVT prophylaxis  Supportive care  PT OT  Patient is full code  Discussed with nursing staff  Follow closely further recommendation according to hospital course    JAIME MELENDREZ MD

## 2025-05-22 NOTE — CASE MANAGEMENT/SOCIAL WORK
Discharge Planning Assessment  [facility]     Patient Name: Eric Simms  MRN: [id number]  Today's Date: 5/22/2025    Admit Date: 5/21/2025    Plan: Home with family and current HH   Discharge Needs Assessment       Row Name 05/22/25 162       Living Environment    People in Home child(filipe), adult;spouse    Current Living Arrangements home    Potentially Unsafe Housing Conditions none    Primary Care Provided by self;spouse/significant other;child(filipe)    Family Caregiver if Needed child(filipe), adult;spouse    Quality of Family Relationships involved;helpful    Able to Return to Prior Arrangements yes       Resource/Environmental Concerns    Resource/Environmental Concerns none    Transportation Concerns no car       Transition Planning    Patient/Family Anticipates Transition to home with family;home with help/services    Patient/Family Anticipated Services at Transition home health care    Transportation Anticipated family or friend will provide;other (see comments)       Discharge Needs Assessment    Readmission Within the Last 30 Days no previous admission in last 30 days    Equipment Currently Used at Home cane, straight;glucometer;shower chair;rollator    Concerns to be Addressed adjustment to diagnosis/illness    Anticipated Changes Related to Illness none    Equipment Needed After Discharge none                   Discharge Plan       Row Name 05/22/25 0831       Plan    Plan Home with family and current HH    Patient/Family in Agreement with Plan yes    Plan Comments Spoke with the pt wife by phone, pt confused, introduced self and explained CCP role, verified the face sheet and pharmacy information. Pt lives with wife and adult dtr in 1 level home with 3 FERNANDO, he needs assistance with all ADL's, he uses rollator, s/c, and cane. He is current with HH but she cannot recall agency name, notified [facility] RN to manage. No SNF history, pt plans home with family and their car is not working, he will need transport  home.  CCP will follow - Mary DICKEY                    Expected Discharge Date and Time       Expected Discharge Date Expected Discharge Time    May 24, 2025            Demographic Summary       Row Name 05/22/25 1623       General Information    Admission Type inpatient                   Functional Status       Row Name 05/22/25 1623       Functional Status    Usual Activity Tolerance moderate    Current Activity Tolerance fair       Assessment of Health Literacy    Health Literacy Low       Functional Status, IADL    Medications assistive person    Meal Preparation assistive person    Housekeeping assistive person    Laundry assistive person    Shopping assistive person       Mental Status    General Appearance WDL WDL       Mental Status Summary    Recent Changes in Mental Status/Cognitive Functioning unable to assess                   Psychosocial    No documentation.                  Abuse/Neglect    No documentation.                  Legal       Row Name 05/22/25 1623       Financial/Legal    Who Manages Finances if Patient Unable wife                   Substance Abuse    No documentation.                  Patient Forms    No documentation.                     Mary Monique RN

## 2025-05-22 NOTE — NURSING NOTE
Attempted to call wife about patients meds list she said that she would either call back after writing it down or would be up tomorrow to give information on patients meds.

## 2025-05-23 LAB
ALBUMIN SERPL-MCNC: 3.5 G/DL (ref 3.5–5.2)
ALBUMIN/GLOB SERPL: 1.3 G/DL
ALP SERPL-CCNC: 41 U/L (ref 39–117)
ALT SERPL W P-5'-P-CCNC: 25 U/L (ref 1–41)
ANION GAP SERPL CALCULATED.3IONS-SCNC: 9 MMOL/L (ref 5–15)
AST SERPL-CCNC: 27 U/L (ref 1–40)
BASOPHILS # BLD MANUAL: 0.04 10*3/MM3 (ref 0–0.2)
BASOPHILS NFR BLD MANUAL: 1 % (ref 0–1.5)
BILIRUB SERPL-MCNC: 0.3 MG/DL (ref 0–1.2)
BUN SERPL-MCNC: 14 MG/DL (ref 8–23)
BUN/CREAT SERPL: 9.2 (ref 7–25)
CALCIUM SPEC-SCNC: 8.7 MG/DL (ref 8.6–10.5)
CHLORIDE SERPL-SCNC: 105 MMOL/L (ref 98–107)
CHOLEST SERPL-MCNC: 161 MG/DL (ref 0–200)
CO2 SERPL-SCNC: 24 MMOL/L (ref 22–29)
CREAT SERPL-MCNC: 1.53 MG/DL (ref 0.76–1.27)
DEPRECATED RDW RBC AUTO: 48.9 FL (ref 37–54)
EGFRCR SERPLBLD CKD-EPI 2021: 50.1 ML/MIN/1.73
EOSINOPHIL # BLD MANUAL: 0 10*3/MM3 (ref 0–0.4)
EOSINOPHIL NFR BLD MANUAL: 0 % (ref 0.3–6.2)
ERYTHROCYTE [DISTWIDTH] IN BLOOD BY AUTOMATED COUNT: 14.1 % (ref 12.3–15.4)
GLOBULIN UR ELPH-MCNC: 2.6 GM/DL
GLUCOSE BLDC GLUCOMTR-MCNC: 120 MG/DL (ref 70–130)
GLUCOSE BLDC GLUCOMTR-MCNC: 158 MG/DL (ref 70–130)
GLUCOSE BLDC GLUCOMTR-MCNC: 237 MG/DL (ref 70–130)
GLUCOSE BLDC GLUCOMTR-MCNC: 356 MG/DL (ref 70–130)
GLUCOSE SERPL-MCNC: 95 MG/DL (ref 65–99)
HBA1C MFR BLD: 13.3 % (ref 4.8–5.6)
HCT VFR BLD AUTO: 30.2 % (ref 37.5–51)
HDLC SERPL-MCNC: 24 MG/DL (ref 40–60)
HGB BLD-MCNC: 10.4 G/DL (ref 13–17.7)
LDLC SERPL CALC-MCNC: 109 MG/DL (ref 0–100)
LDLC/HDLC SERPL: 4.39 {RATIO}
LYMPHOCYTES # BLD MANUAL: 0.81 10*3/MM3 (ref 0.7–3.1)
LYMPHOCYTES NFR BLD MANUAL: 8 % (ref 5–12)
MCH RBC QN AUTO: 32.5 PG (ref 26.6–33)
MCHC RBC AUTO-ENTMCNC: 34.4 G/DL (ref 31.5–35.7)
MCV RBC AUTO: 94.4 FL (ref 79–97)
MONOCYTES # BLD: 0.36 10*3/MM3 (ref 0.1–0.9)
NEUTROPHILS # BLD AUTO: 3.27 10*3/MM3 (ref 1.7–7)
NEUTROPHILS NFR BLD MANUAL: 73 % (ref 42.7–76)
PHOSPHATE SERPL-MCNC: 2.5 MG/DL (ref 2.5–4.5)
PLAT MORPH BLD: NORMAL
PLATELET # BLD AUTO: 173 10*3/MM3 (ref 140–450)
PMV BLD AUTO: 11.8 FL (ref 6–12)
POTASSIUM SERPL-SCNC: 4.8 MMOL/L (ref 3.5–5.2)
PROT SERPL-MCNC: 6.1 G/DL (ref 6–8.5)
RBC # BLD AUTO: 3.2 10*6/MM3 (ref 4.14–5.8)
RBC MORPH BLD: NORMAL
SODIUM SERPL-SCNC: 138 MMOL/L (ref 136–145)
TRIGL SERPL-MCNC: 158 MG/DL (ref 0–150)
TSH SERPL DL<=0.05 MIU/L-ACNC: 1.34 UIU/ML (ref 0.27–4.2)
URATE SERPL-MCNC: 5.5 MG/DL (ref 3.4–7)
VARIANT LYMPHS NFR BLD MANUAL: 18 % (ref 19.6–45.3)
VLDLC SERPL-MCNC: 28 MG/DL (ref 5–40)
WBC MORPH BLD: NORMAL
WBC NRBC COR # BLD AUTO: 4.48 10*3/MM3 (ref 3.4–10.8)

## 2025-05-23 PROCEDURE — 85007 BL SMEAR W/DIFF WBC COUNT: CPT | Performed by: HOSPITALIST

## 2025-05-23 PROCEDURE — 80053 COMPREHEN METABOLIC PANEL: CPT | Performed by: HOSPITALIST

## 2025-05-23 PROCEDURE — 84443 ASSAY THYROID STIM HORMONE: CPT | Performed by: HOSPITALIST

## 2025-05-23 PROCEDURE — 80061 LIPID PANEL: CPT | Performed by: HOSPITALIST

## 2025-05-23 PROCEDURE — 82948 REAGENT STRIP/BLOOD GLUCOSE: CPT

## 2025-05-23 PROCEDURE — 63710000001 INSULIN LISPRO (HUMAN) PER 5 UNITS: Performed by: HOSPITALIST

## 2025-05-23 PROCEDURE — 97530 THERAPEUTIC ACTIVITIES: CPT

## 2025-05-23 PROCEDURE — 83036 HEMOGLOBIN GLYCOSYLATED A1C: CPT | Performed by: HOSPITALIST

## 2025-05-23 PROCEDURE — 84550 ASSAY OF BLOOD/URIC ACID: CPT | Performed by: INTERNAL MEDICINE

## 2025-05-23 PROCEDURE — 85025 COMPLETE CBC W/AUTO DIFF WBC: CPT | Performed by: HOSPITALIST

## 2025-05-23 PROCEDURE — 84100 ASSAY OF PHOSPHORUS: CPT | Performed by: INTERNAL MEDICINE

## 2025-05-23 RX ORDER — ACETAMINOPHEN 325 MG/1
650 TABLET ORAL EVERY 6 HOURS PRN
Status: DISCONTINUED | OUTPATIENT
Start: 2025-05-23 | End: 2025-05-26 | Stop reason: HOSPADM

## 2025-05-23 RX ADMIN — LEVETIRACETAM 500 MG: 500 TABLET, FILM COATED ORAL at 09:53

## 2025-05-23 RX ADMIN — CARVEDILOL 12.5 MG: 12.5 TABLET, FILM COATED ORAL at 09:53

## 2025-05-23 RX ADMIN — AMLODIPINE BESYLATE 10 MG: 10 TABLET ORAL at 09:53

## 2025-05-23 RX ADMIN — CARVEDILOL 12.5 MG: 12.5 TABLET, FILM COATED ORAL at 18:55

## 2025-05-23 RX ADMIN — ASPIRIN 81 MG CHEWABLE TABLET 81 MG: 81 TABLET CHEWABLE at 09:53

## 2025-05-23 RX ADMIN — INSULIN LISPRO 3 UNITS: 100 INJECTION, SOLUTION INTRAVENOUS; SUBCUTANEOUS at 18:55

## 2025-05-23 RX ADMIN — ACETAMINOPHEN 650 MG: 325 TABLET ORAL at 09:53

## 2025-05-23 RX ADMIN — LEVETIRACETAM 500 MG: 500 TABLET, FILM COATED ORAL at 22:34

## 2025-05-23 RX ADMIN — FAMOTIDINE 20 MG: 20 TABLET, FILM COATED ORAL at 09:53

## 2025-05-23 RX ADMIN — FAMOTIDINE 20 MG: 20 TABLET, FILM COATED ORAL at 22:34

## 2025-05-23 RX ADMIN — INSULIN LISPRO 6 UNITS: 100 INJECTION, SOLUTION INTRAVENOUS; SUBCUTANEOUS at 22:35

## 2025-05-23 RX ADMIN — ATORVASTATIN CALCIUM 10 MG: 10 TABLET ORAL at 22:34

## 2025-05-23 RX ADMIN — TRAZODONE HYDROCHLORIDE 100 MG: 100 TABLET ORAL at 22:34

## 2025-05-23 RX ADMIN — ALLOPURINOL 100 MG: 100 TABLET ORAL at 09:53

## 2025-05-23 RX ADMIN — ISOSORBIDE MONONITRATE 30 MG: 30 TABLET, EXTENDED RELEASE ORAL at 11:07

## 2025-05-23 RX ADMIN — MONTELUKAST 10 MG: 10 TABLET, FILM COATED ORAL at 22:34

## 2025-05-23 RX ADMIN — CLOPIDOGREL BISULFATE 75 MG: 75 TABLET, FILM COATED ORAL at 09:53

## 2025-05-23 NOTE — DISCHARGE PLACEMENT REQUEST
"Eric Simms (65 y.o. Male)       Date of Birth   1960    Social Security Number       Address   [address] [place] [address]    Home Phone   [phone]    MRN   [id number]       Religious   Voodoo    Marital Status                               Admission Date   5/21/2025    Admission Type   Emergency    Admitting Provider   Atilio Melendrez MD    Attending Provider   Atilio Melendrez MD    Department, Room/Bed   [address], [address]       Discharge Date       Discharge Disposition       Discharge Destination                                 Attending Provider: Atilio Melendrez MD    Allergies: No Known Allergies    Isolation: None   Infection: None   Code Status: CPR    Ht: 165.1 cm (65\")   Wt: 88 kg (194 lb 0.1 oz)    Admission Cmt: None   Principal Problem: MARLON (acute kidney injury) [N17.9]                   Active Insurance as of 5/21/2025       Primary Coverage       Payor Plan Insurance Group Employer/Plan Group    [employer] MEDICARE REPLACEMENT UHC MEDICARE ADVANTAGE SNP PPO [id number]       Payor Plan Address Payor Plan Phone Number Payor Plan Fax Number Effective Dates    [address] [address]   4/1/2025 - None Entered    [facility] [address]         Subscriber Name Subscriber Birth Date Member ID       ERIC SIMMS 1960 [id number]               Secondary Coverage       Payor Plan Insurance Group Employer/Plan Group    [employer] [place] WELL[employer] [place]ICAID [id number]       Payor Plan Address Payor Plan Phone Number Payor Plan Fax Number Effective Dates    [address] [address] [phone]  2/22/2023 - None Entered    [facility] [id number]         Subscriber Name Subscriber Birth Date Member ID       ERIC SIMMS 1960 [id number]                     Emergency Contacts        (Rel.) Home Phone Work Phone Mobile Phone    DALIA MACHADO (Spouse) [phone] -- [phone]    Bonnie Pozo (Daughter) -- -- [phone]                "

## 2025-05-23 NOTE — THERAPY TREATMENT NOTE
Patient Name: Eric Simms  : 1960    MRN: [id number]                              Today's Date: 2025       Admit Date: 2025    Visit Dx:     ICD-10-CM ICD-9-CM   1. Acute kidney injury superimposed on chronic kidney disease  N17.9 584.9    N18.9 585.9   2. Altered mental status, unspecified altered mental status type  R41.82 780.97   3. Hypotension, unspecified hypotension type  I95.9 458.9     Patient Active Problem List   Diagnosis    Chest pain, unspecified type    Type 2 diabetes mellitus with hyperglycemia    HTN (hypertension)    Obesity (BMI 30-39.9)    Anemia, chronic disease    CKD (chronic kidney disease) stage 2, GFR 60-89 ml/min    Seizure disorder    MARLON (acute kidney injury)    Acute kidney injury     Past Medical History:   Diagnosis Date    Coronary artery disease     Diabetes mellitus     GERD (gastroesophageal reflux disease)     Hyperlipidemia     Hypertension     Peripheral neuropathy     Seizures     Stroke      History reviewed. No pertinent surgical history.   General Information       Row Name 25 1544          Physical Therapy Time and Intention    Document Type therapy note (daily note)  -MS     Mode of Treatment physical therapy;individual therapy  -MS       Row Name 25 1544          General Information    Patient Profile Reviewed yes  -MS     Existing Precautions/Restrictions fall   Exit alarm  -MS     Barriers to Rehab none identified  -MS       Row Name 25 1544          Cognition    Orientation Status (Cognition) oriented x 3  -MS       Row Name 25 1544          Safety Issues/Impairments Affecting Functional Mobility    Comment, Safety Issues/Impairments (Mobility) Gait belt used for safety.  -MS               User Key  (r) = Recorded By, (t) = Taken By, (c) = Cosigned By      Initials Name Provider Type    Sergio Hutchison, PT Physical Therapist                   Mobility       Row Name 25 1544          Bed Mobility    Supine-Sit  [place] (Bed Mobility) contact guard  -MS       Row Name 05/23/25 1544          Sit-Stand Transfer    Sit-Stand [place] (Transfers) contact guard  -MS     Assistive Device (Sit-Stand Transfers) walker, front-wheeled  -MS       Row Name 05/23/25 1544          Gait/Stairs (Locomotion)    [place] Level (Gait) contact guard  -MS     Assistive Device (Gait) walker, front-wheeled  -MS     Distance in Feet (Gait) 120  -MS     Deviations/Abnormal Patterns (Gait) zenia decreased  -MS     Bilateral Gait Deviations forward flexed posture  -MS     Comment, (Gait/Stairs) Verbal/tactile cues given for posture correction.  -MS               User Key  (r) = Recorded By, (t) = Taken By, (c) = Cosigned By      Initials Name Provider Type    Sergio Hutchison, PT Physical Therapist                   Obj/Interventions    No documentation.                  Goals/Plan    No documentation.                  Clinical Impression       Row Name 05/23/25 1545          Pain    Pretreatment Pain Rating 0/10 - no pain  -MS     Posttreatment Pain Rating 0/10 - no pain  -MS       Row Name 05/23/25 1545          Therapy Assessment/Plan (PT)    Therapy Frequency (PT) 5 times/wk  -MS       Row Name 05/23/25 1545          Positioning and Restraints    Pre-Treatment Position in bed  -MS     Post Treatment Position bed  -MS     In Bed notified nsg;sitting EOB;call light within reach;encouraged to call for assist;with nsg  All lines intact.  -MS               User Key  (r) = Recorded By, (t) = Taken By, (c) = Cosigned By      Initials Name Provider Type    Sergio Hutchison, PT Physical Therapist                   Outcome Measures       Row Name 05/23/25 1545 05/23/25 0827       How much help from another person do you currently need...    Turning from your back to your side while in flat bed without using bedrails? 4  -MS 4  -TC    Moving from lying on back to sitting on the side of a flat bed without bedrails? 3  -MS 4  -TC     Moving to and from a bed to a chair (including a wheelchair)? 3  -MS 4  -TC    Standing up from a chair using your arms (e.g., wheelchair, bedside chair)? 3  -MS 4  -TC    Climbing 3-5 steps with a railing? 3  -MS 4  -TC    To walk in hospital room? 3  -MS 4  -TC    AM-PAC 6 Clicks Score (PT) 19  -MS 24  -TC    Highest Level of Mobility Goal Walk 10 Steps or More-6  -MS Walk 250 Feet or More - 8  -TC      Row Name 05/23/25 1545          Functional Assessment    Outcome Measure Options AM-PAC 6 Clicks Basic Mobility (PT)  -MS               User Key  (r) = Recorded By, (t) = Taken By, (c) = Cosigned By      Initials Name Provider Type    MS Sergio Tilley, PT Physical Therapist    Drea Ortiz, RN Registered Nurse                                 Physical Therapy Education       Title: PT OT SLP Therapies (Done)       Topic: Physical Therapy (Done)       Point: Mobility training (Done)       Learning Progress Summary            Patient Acceptance, E,D, VU,NR by MS at 5/23/2025 1546    Acceptance, E,D, VU,NR by MS at 5/22/2025 1128                      Point: Home exercise program (Done)       Learning Progress Summary            Patient Acceptance, E,D, VU,NR by MS at 5/22/2025 1128                      Point: Body mechanics (Done)       Learning Progress Summary            Patient Acceptance, E,D, VU,NR by MS at 5/23/2025 1546    Acceptance, E,D, VU,NR by MS at 5/22/2025 1128                      Point: Precautions (Done)       Learning Progress Summary            Patient Acceptance, E,D, VU,NR by MS at 5/23/2025 1546    Acceptance, E,D, VU,NR by MS at 5/22/2025 1128                                      User Key       Initials Effective Dates Name Provider Type Discipline    MS 06/16/21 -  Sergio Tilley, PT Physical Therapist PT                  PT Recommendation and Plan  Planned Therapy Interventions (PT): balance training, bed mobility training, gait training, home exercise program, patient/family  education, postural re-education, transfer training, strengthening  Outcome Evaluation: Upon entering room, pt. supine in bed, awake/alert, and agreeable to work with P.T. this date.  This PM, pt. able to ambulate 120 feet, CGA x 1, with use of Rwx.  Pt. requires CGA x 1 for bed mobility and CGA x 1 for sit <-> stand transfers.  Verbal/tactile cues given during ambulation for posture correction.  Overall improved tolerance to functional activity this date with an increase in gait distance.  Will continue to progress functional mobility as tolerated.     Time Calculation:         PT Charges       Row Name 05/23/25 1550             Time Calculation    Start Time 1350  -MS      Stop Time 1410  -MS      Time Calculation (min) 20 min  -MS      PT Received On 05/23/25  -MS      PT - Next Appointment 05/26/25  -MS         Time Calculation- PT    Total Timed Code Minutes- PT 19 minute(s)  -MS                User Key  (r) = Recorded By, (t) = Taken By, (c) = Cosigned By      Initials Name Provider Type    Sergio Hutchison, PT Physical Therapist                  Therapy Charges for Today       Code Description Service Date Service Provider Modifiers Qty    83976044060  PT EVAL MOD COMPLEXITY 3 5/22/2025 Sergio Tilley, PT GP 1    93613751556 HC PT THERAPEUTIC ACT EA 15 MIN 5/22/2025 Sergio Tilley, PT GP 1    06467071754 HC PT THERAPEUTIC ACT EA 15 MIN 5/23/2025 Sergio Tilley, PT GP 1            PT G-Codes  Outcome Measure Options: AM-PAC 6 Clicks Basic Mobility (PT)  AM-PAC 6 Clicks Score (PT): 19  AM-PAC 6 Clicks Score (OT): 24  Modified Amairani Scale: 0 - No Symptoms at all.  PT Discharge Summary  Anticipated Discharge Disposition (PT): home with assist, home with home health    Sergio Tilley, PT  5/23/2025

## 2025-05-23 NOTE — NURSING NOTE
Pt requesting his home medication of Norco, states he gets 90 pills each month. Requested for headache. Explained the reasoning behind not having narcotics when someone has had an unresponsive episode. Offered ice. Pt declined. No Tylenol ordered. Will call Dr. Melendrez.

## 2025-05-23 NOTE — PLAN OF CARE
Goal Outcome Evaluation:  Plan of Care Reviewed With: patient           Outcome Evaluation: Upon entering room, pt. supine in bed, awake/alert, and agreeable to work with P.T. this date.  This PM, pt. able to ambulate 120 feet, CGA x 1, with use of Rwx.  Pt. requires CGA x 1 for bed mobility and CGA x 1 for sit <-> stand transfers.  Verbal/tactile cues given during ambulation for posture correction.  Overall improved tolerance to functional activity this date with an increase in gait distance.  Will continue to progress functional mobility as tolerated.    Anticipated Discharge Disposition (PT): home with assist, home with home health

## 2025-05-23 NOTE — PROGRESS NOTES
"   LOS: 1 day     Chief Complaint/ Reason for encounter: MARLON, CKD    Subjective   05/23/25 : Patient is doing well today with no new complaints  Good appetite with no nausea or vomiting  No shortness of breath chest pain or edema  Voiding well with no dysuria  Feeling better, less confused, no complaints        Medical history reviewed:  History of Present Illness    Subjective    History taken from: Chart and patient/family as able    Vital Signs  Temp:  [98.8 °F (37.1 °C)-99.7 °F (37.6 °C)] 98.8 °F (37.1 °C)  Heart Rate:  [71-86] 71  Resp:  [16-18] 18  BP: (129-156)/(48-83) 151/69       Wt Readings from Last 1 Encounters:   05/21/25 2331 88 kg (194 lb 0.1 oz)   05/21/25 1508 84.2 kg (185 lb 10 oz)       Objective:  Vital signs: (most recent): Blood pressure 151/69, pulse 71, temperature 98.8 °F (37.1 °C), temperature source Oral, resp. rate 18, height 165.1 cm (65\"), weight 88 kg (194 lb 0.1 oz), SpO2 100%.                Objective:  General Appearance:  Comfortable, well-appearing, no acute distress   HEENT: Mucous membranes moist, atraumatic  Lungs:  Normal effort and normal respiratory rate.  Breath sounds clear to auscultation: No rhonchi/Rales.  No  respiratory distress.   Heart:  S1, S2 normal.   Abdomen: Abdomen is soft, nontender/nondistended  Extremities: No edema of bilateral lower extremities  Skin:  Warm and dry       Results Review:    Intake/Output:     Intake/Output Summary (Last 24 hours) at 5/23/2025 1054  Last data filed at 5/23/2025 0623  Gross per 24 hour   Intake 600 ml   Output 2850 ml   Net -2250 ml         DATA:  Radiology and Labs:  The following labs independently reviewed by me. Additional labs ordered for tomorrow a.m.  Interval notes, chart personally reviewed by me.   Old records independently reviewed showing CKD 3 baseline creatinine around 1.4.  The following radiologic studies independently viewed by me, findings renal ultrasound showed borderline small kidneys but no " hydronephrosis masses or stones  New problems include MARLON on CKD  Discussed with patient himself at bedside    Risk/ complexity of medical care/ medical decision making high complexity, MARLON management      Labs:   Recent Results (from the past 24 hours)   POC Glucose Once    Collection Time: 05/22/25 11:15 AM    Specimen: Blood   Result Value Ref Range    Glucose 130 70 - 130 mg/dL   POC Glucose Once    Collection Time: 05/22/25  6:27 PM    Specimen: Blood   Result Value Ref Range    Glucose 100 70 - 130 mg/dL   POC Glucose Once    Collection Time: 05/22/25  8:13 PM    Specimen: Blood   Result Value Ref Range    Glucose 112 70 - 130 mg/dL   Comprehensive Metabolic Panel    Collection Time: 05/23/25  5:27 AM    Specimen: Blood   Result Value Ref Range    Glucose 95 65 - 99 mg/dL    BUN 14 8 - 23 mg/dL    Creatinine 1.53 (H) 0.76 - 1.27 mg/dL    Sodium 138 136 - 145 mmol/L    Potassium 4.8 3.5 - 5.2 mmol/L    Chloride 105 98 - 107 mmol/L    CO2 24.0 22.0 - 29.0 mmol/L    Calcium 8.7 8.6 - 10.5 mg/dL    Total Protein 6.1 6.0 - 8.5 g/dL    Albumin 3.5 3.5 - 5.2 g/dL    ALT (SGPT) 25 1 - 41 U/L    AST (SGOT) 27 1 - 40 U/L    Alkaline Phosphatase 41 39 - 117 U/L    Total Bilirubin 0.3 0.0 - 1.2 mg/dL    Globulin 2.6 gm/dL    A/G Ratio 1.3 g/dL    BUN/Creatinine Ratio 9.2 7.0 - 25.0    Anion Gap 9.0 5.0 - 15.0 mmol/L    eGFR 50.1 (L) >60.0 mL/min/1.73   TSH    Collection Time: 05/23/25  5:27 AM    Specimen: Blood   Result Value Ref Range    TSH 1.340 0.270 - 4.200 uIU/mL   Lipid Panel    Collection Time: 05/23/25  5:27 AM    Specimen: Blood   Result Value Ref Range    Total Cholesterol 161 0 - 200 mg/dL    Triglycerides 158 (H) 0 - 150 mg/dL    HDL Cholesterol 24 (L) 40 - 60 mg/dL    LDL Cholesterol  109 (H) 0 - 100 mg/dL    VLDL Cholesterol 28 5 - 40 mg/dL    LDL/HDL Ratio 4.39    Uric Acid    Collection Time: 05/23/25  5:27 AM    Specimen: Blood   Result Value Ref Range    Uric Acid 5.5 3.4 - 7.0 mg/dL   CBC Auto  Differential    Collection Time: 05/23/25  5:27 AM    Specimen: Blood   Result Value Ref Range    WBC 4.48 3.40 - 10.80 10*3/mm3    RBC 3.20 (L) 4.14 - 5.80 10*6/mm3    Hemoglobin 10.4 (L) 13.0 - 17.7 g/dL    Hematocrit 30.2 (L) 37.5 - 51.0 %    MCV 94.4 79.0 - 97.0 fL    MCH 32.5 26.6 - 33.0 pg    MCHC 34.4 31.5 - 35.7 g/dL    RDW 14.1 12.3 - 15.4 %    RDW-SD 48.9 37.0 - 54.0 fl    MPV 11.8 6.0 - 12.0 fL    Platelets 173 140 - 450 10*3/mm3   Phosphorus    Collection Time: 05/23/25  5:27 AM    Specimen: Blood   Result Value Ref Range    Phosphorus 2.5 2.5 - 4.5 mg/dL   Manual Differential    Collection Time: 05/23/25  5:27 AM    Specimen: Blood   Result Value Ref Range    Neutrophil % 73.0 42.7 - 76.0 %    Lymphocyte % 18.0 (L) 19.6 - 45.3 %    Monocyte % 8.0 5.0 - 12.0 %    Eosinophil % 0.0 (L) 0.3 - 6.2 %    Basophil % 1.0 0.0 - 1.5 %    Neutrophils Absolute 3.27 1.70 - 7.00 10*3/mm3    Lymphocytes Absolute 0.81 0.70 - 3.10 10*3/mm3    Monocytes Absolute 0.36 0.10 - 0.90 10*3/mm3    Eosinophils Absolute 0.00 0.00 - 0.40 10*3/mm3    Basophils Absolute 0.04 0.00 - 0.20 10*3/mm3    RBC Morphology Normal Normal    WBC Morphology Normal Normal    Platelet Morphology Normal Normal   POC Glucose Once    Collection Time: 05/23/25  6:07 AM    Specimen: Blood   Result Value Ref Range    Glucose 120 70 - 130 mg/dL       Radiology:  Pertinent radiology studies were reviewed as described above      Medications have been reviewed separately in chart review medication tab      ASSESSMENT:  MARLON, prerenal with hypotension contributing.  Improving with fluids  CKD stage IIIa, baseline creatinine around 1.3  Hypotension, better with fluids  Diabetes mellitus type II, poorly controlled, A1c 13  Altered mental status, improved    MARLON (acute kidney injury)  Anemia of CKD        DISCUSSION/PLAN:   Renal function continues to improve with IV hydration  Oral intake has improved so will discontinue IV fluids  He is currently on 3 L O2 but  O2 sats this morning were 100% on room air, wean as able  Renal ultrasound unremarkable  Urine studies with glucosuria but minimal proteinuria  Continue to hold chlorthalidone and monitor BP trends     monitor electrolytes and volume closely and avoid any potential nephrotoxins during period of renal recovery       Rick Stallworth MD  [facility]   Office phone number: [phone]  Answering service phone number: [phone]    05/23/25  10:54 EDT    Dictation performed using Dragon dictation software

## 2025-05-23 NOTE — PLAN OF CARE
Goal Outcome Evaluation:  Plan of Care Reviewed With: patient        Progress: no change  Outcome Evaluation: Pt A&O x4, room air, SR on monitor with vitals as charted, up with assist and walker to toilet- unsteady occassionally when first begins ambulation, reg diet w/ thins, accu checks, reported headache this am- no med availble, tylenol prn added to MAR, IV fluids DC as order ,

## 2025-05-23 NOTE — CONSULTS
"Nutrition Services    Patient Name: Eric Simms  YOB: 1960  MRN: [id number]  Admission date: 5/21/2025    Assessment Date:  05/23/25    NUTRITION EVALUATION      Reason for Encounter MST score 2+   Diagnosis/Problem Admission Diagnosis:  MARLON (acute kidney injury) [N17.9]  Hypotension, unspecified hypotension type [I95.9]  Altered mental status, unspecified altered mental status type [R41.82]  Acute kidney injury superimposed on chronic kidney disease [N17.9, N18.9]  Acute kidney injury [N17.9]    Problem List:    MARLON (acute kidney injury)    Acute kidney injury     Narrative Patient reports he has lost weight over the past 5 months, probably about 20-25 lbs but he's really not certain. He was at another hospital and had difficulty with nausea and vomiting. He feels this lead to most of his weight loss he reports his appetite is good now and he has been eating fair since admission here. Offered supplements, pt declined. He believes he is going to be discharged tomorrow.        PO Diet Diet: Regular/House; Fluid Consistency: Thin (IDDSI 0)   Allergies NKFA   Supplements n/a   PO Intake % 25% x 2 100% at snack x1        Chewing/Swallowing Difficulty no issues identified at this time       Medications reviewed   Labs  reviewed       Physical Findings alert     Edema no edema    GI Function WDL, last bowel movement: 5/21/2025   Skin Status skin intact    Lines/Drains none   I/O reviewed        Height  Weight  BMI  Weight Trend     Height: 165.1 cm (65\")  Weight: 88 kg (194 lb 0.1 oz) (05/21/25 2331)  Body mass index is 32.28 kg/m².  Unknown No weights other than admission since 2023         NFPE No clinical signs of muscle wasting or fat loss       Nutrition Problem (PES) Problem: Nutrition Appropriate for Condition at this Time         Intervention/Plan Continue regular diet   Encourage PO intake     RD to follow up per protocol.     Results from last 7 days   Lab Units 05/23/25  0527 05/22/25  0729 " 05/21/25  1527   SODIUM mmol/L 138 138 132*   POTASSIUM mmol/L 4.8 4.6 4.7   CHLORIDE mmol/L 105 104 95*   CO2 mmol/L 24.0 26.0 25.0   BUN mg/dL 14 26* 33*   CREATININE mg/dL 1.53* 1.98* 2.69*   CALCIUM mg/dL 8.7 8.6 9.1   BILIRUBIN mg/dL 0.3  --  0.5   ALK PHOS U/L 41  --  52   ALT (SGPT) U/L 25  --  27   AST (SGOT) U/L 27  --  31   GLUCOSE mg/dL 95 96 162*     Results from last 7 days   Lab Units 05/23/25  0527 05/22/25  0729 05/21/25  1527   MAGNESIUM mg/dL  --   --  2.5*   PHOSPHORUS mg/dL 2.5  --  2.9   HEMOGLOBIN g/dL 10.4*   < > 12.6*   HEMATOCRIT % 30.2*   < > 37.2*   TRIGLYCERIDES mg/dL 158*  --   --     < > = values in this interval not displayed.     Lab Results   Component Value Date    HGBA1C 13.30 (H) 05/23/2025     Wt Readings from Last 10 Encounters:   05/21/25 88 kg (194 lb 0.1 oz)   02/22/23 97.1 kg (214 lb)       Electronically signed by:  Rae Bynum RD  05/23/25 13:47 EDT

## 2025-05-23 NOTE — PLAN OF CARE
Goal Outcome Evaluation:            Pt had complaints of headache pain. States that he's had a few episodes of being unconscious due to low sugar before. States that he gets 90 pills a month for pain and would like to take them for his headache. Physician was called and message left. Steady except when first stands. No other complaints or changes throughout night.

## 2025-05-23 NOTE — PROGRESS NOTES
"Daily progress note    Primary care physician      Subjective  Doing better with no new complaints    History of present illness  65-year-old male with history of diabetes hypertension CVA coronary artery disease chronic kidney disease who was recently discharged from [facility] presented to [facility] emergency room with altered mental status.  Patient evaluated in ER found to have acute kidney injury admitted for management.  At the time of examination he is awake and alert and give me a good history but dysarthric which he said is baseline and denies any headache dizziness chest pain shortness of breath abdominal pain nausea vomiting diarrhea.     REVIEW OF SYSTEMS  All systems reviewed and negative except for those discussed in HPI.     PHYSICAL EXAM   Blood pressure 151/69, pulse 71, temperature 98.8 °F (37.1 °C), temperature source Oral, resp. rate 18, height 165.1 cm (65\"), weight 88 kg (194 lb 0.1 oz), SpO2 100%.    Constitutional:       General: He is not in acute distress.     Appearance: Normal appearance. He is not ill-appearing, toxic-appearing or diaphoretic.   HENT:      Head: Normocephalic and atraumatic.      Nose: Nose normal.      Mouth/Throat:      Mouth: Mucous membranes are dry.   Eyes:      Extraocular Movements: Extraocular movements intact.      Conjunctiva/sclera: Conjunctivae normal.      Pupils: Pupils are equal, round, and reactive to light.   Cardiovascular:      Rate and Rhythm: Normal rate and regular rhythm.      Pulses: Normal pulses.      Heart sounds: Normal heart sounds. No murmur heard.     No friction rub. No gallop.   Pulmonary:      Effort: Pulmonary effort is normal. No respiratory distress.      Breath sounds: Normal breath sounds. No stridor. No wheezing, rhonchi or rales.   Abdominal:      General: Abdomen is flat. There is no distension.      Palpations: Abdomen is soft.      Tenderness: There is abdominal tenderness  There is no guarding or " rebound.   Musculoskeletal:      Cervical back: Normal range of motion and neck supple.   Skin:     General: Skin is warm and dry.      Capillary Refill: Capillary refill takes less than 2 seconds.   Neurological:      General: No focal deficit present.      Mental Status: He is oriented to person, place, and time. Mental status is at baseline.   Psychiatric:         Mood and Affect: Mood normal.         Behavior: Behavior normal.         Thought Content: Thought content normal.         Judgment: Judgment normal.      LAB RESULTS  Lab Results (last 24 hours)       Procedure Component Value Units Date/Time    POC Glucose Once [[id number]]  (Abnormal) Collected: 05/23/25 1127    Specimen: Blood Updated: 05/23/25 1127     Glucose 158 mg/dL     Hemoglobin A1c [[id number]]  (Abnormal) Collected: 05/23/25 0527    Specimen: Blood Updated: 05/23/25 1056     Hemoglobin A1C 13.30 %     Narrative:      Hemoglobin A1C Ranges:    Increased Risk for Diabetes  5.7% to 6.4%  Diabetes                     >= 6.5%  Diabetic Goal                < 7.0%    Manual Differential [[id number]]  (Abnormal) Collected: 05/23/25 0527    Specimen: Blood Updated: 05/23/25 0654     Neutrophil % 73.0 %      Lymphocyte % 18.0 %      Monocyte % 8.0 %      Eosinophil % 0.0 %      Basophil % 1.0 %      Neutrophils Absolute 3.27 10*3/mm3      Lymphocytes Absolute 0.81 10*3/mm3      Monocytes Absolute 0.36 10*3/mm3      Eosinophils Absolute 0.00 10*3/mm3      Basophils Absolute 0.04 10*3/mm3      RBC Morphology Normal     WBC Morphology Normal     Platelet Morphology Normal    CBC & Differential [[id number]]  (Abnormal) Collected: 05/23/25 0527    Specimen: Blood Updated: 05/23/25 0654    Narrative:      The following orders were created for panel order CBC & Differential.  Procedure                               Abnormality         Status                     ---------                               -----------         ------                     CBC Auto  Differential[[id number]]        Abnormal            Final result                 Please view results for these tests on the individual orders.    CBC Auto Differential [[id number]]  (Abnormal) Collected: 05/23/25 0527    Specimen: Blood Updated: 05/23/25 0654     WBC 4.48 10*3/mm3      RBC 3.20 10*6/mm3      Hemoglobin 10.4 g/dL      Hematocrit 30.2 %      MCV 94.4 fL      MCH 32.5 pg      MCHC 34.4 g/dL      RDW 14.1 %      RDW-SD 48.9 fl      MPV 11.8 fL      Platelets 173 10*3/mm3     TSH [[id number]]  (Normal) Collected: 05/23/25 0527    Specimen: Blood Updated: 05/23/25 0641     TSH 1.340 uIU/mL     Lipid Panel [[id number]]  (Abnormal) Collected: 05/23/25 0527    Specimen: Blood Updated: 05/23/25 0638     Total Cholesterol 161 mg/dL      Triglycerides 158 mg/dL      HDL Cholesterol 24 mg/dL      LDL Cholesterol  109 mg/dL      VLDL Cholesterol 28 mg/dL      LDL/HDL Ratio 4.39    Narrative:      Cholesterol Reference Ranges  (U.S. Department of Health and Human Services ATP III Classifications)    Desirable          <200 mg/dL  Borderline High    200-239 mg/dL  High Risk          >240 mg/dL      Triglyceride Reference Ranges  (U.S. Department of Health and Human Services ATP III Classifications)    Normal           <150 mg/dL  Borderline High  150-199 mg/dL  High             200-499 mg/dL  Very High        >500 mg/dL    HDL Reference Ranges  (U.S. Department of Health and Human Services ATP III Classifications)    Low     <40 mg/dl (major risk factor for CHD)  High    >60 mg/dl ('negative' risk factor for CHD)        LDL Reference Ranges  (U.S. Department of Health and Human Services ATP III Classifications)    Optimal          <100 mg/dL  Near Optimal     100-129 mg/dL  Borderline High  130-159 mg/dL  High             160-189 mg/dL  Very High        >189 mg/dL    LDL is calculated using the NIH LDL-C calculation.      Comprehensive Metabolic Panel [[id number]]  (Abnormal) Collected: 05/23/25 0527    Specimen: Blood  Updated: 05/23/25 0638     Glucose 95 mg/dL      BUN 14 mg/dL      Creatinine 1.53 mg/dL      Sodium 138 mmol/L      Potassium 4.8 mmol/L      Chloride 105 mmol/L      CO2 24.0 mmol/L      Calcium 8.7 mg/dL      Total Protein 6.1 g/dL      Albumin 3.5 g/dL      ALT (SGPT) 25 U/L      AST (SGOT) 27 U/L      Alkaline Phosphatase 41 U/L      Total Bilirubin 0.3 mg/dL      Globulin 2.6 gm/dL      A/G Ratio 1.3 g/dL      BUN/Creatinine Ratio 9.2     Anion Gap 9.0 mmol/L      eGFR 50.1 mL/min/1.73     Narrative:      GFR Categories in Chronic Kidney Disease (CKD)              GFR Category          GFR (mL/min/1.73)    Interpretation  G1                    90 or greater        Normal or high (1)  G2                    60-89                Mild decrease (1)  G3a                   45-59                Mild to moderate decrease  G3b                   30-44                Moderate to severe decrease  G4                    15-29                Severe decrease  G5                    14 or less           Kidney failure    (1)In the absence of evidence of kidney disease, neither GFR category G1 or G2 fulfill the criteria for CKD.    eGFR calculation 2021 CKD-EPI creatinine equation, which does not include race as a factor    Uric Acid [[id number]]  (Normal) Collected: 05/23/25 0527    Specimen: Blood Updated: 05/23/25 0638     Uric Acid 5.5 mg/dL     Phosphorus [[id number]]  (Normal) Collected: 05/23/25 0527    Specimen: Blood Updated: 05/23/25 0638     Phosphorus 2.5 mg/dL     POC Glucose Once [[id number]]  (Normal) Collected: 05/23/25 0607    Specimen: Blood Updated: 05/23/25 0607     Glucose 120 mg/dL     POC Glucose Once [[id number]]  (Normal) Collected: 05/22/25 2013    Specimen: Blood Updated: 05/22/25 2015     Glucose 112 mg/dL     POC Glucose Once [[id number]]  (Normal) Collected: 05/22/25 1827    Specimen: Blood Updated: 05/22/25 1828     Glucose 100 mg/dL     Sodium, Urine, Random - Urine, Clean Catch [[id number]] Collected:  05/21/25 1801    Specimen: Urine, Clean Catch Updated: 05/22/25 1520     Sodium, Urine 27 mmol/L     Narrative:      Reference intervals for random urine have not been established.  Clinical usage is dependent upon physician's interpretation in combination with other laboratory tests.       Protein, Urine, Random - Urine, Clean Catch [[id number]] Collected: 05/21/25 1801    Specimen: Urine, Clean Catch Updated: 05/22/25 1519     Total Protein, Urine 24.5 mg/dL     Narrative:      Reference intervals for random urine have not been established.  Clinical usage is dependent upon physician's interpretation in combination with other laboratory tests.       Creatinine Urine Random (kidney function) GFR component - Urine, Clean Catch [[id number]] Collected: 05/21/25 1801    Specimen: Urine, Clean Catch Updated: 05/22/25 1519     Creatinine, Urine 207.7 mg/dL     Narrative:      Reference intervals for random urine have not been established.  Clinical usage is dependent upon physician's interpretation in combination with other laboratory tests.             Imaging Results (Last 24 Hours)       Procedure Component Value Units Date/Time    US Renal Bilateral [[id number]] Collected: 05/22/25 1636     Updated: 05/22/25 1701    Narrative:      US RENAL BILATERAL-5/22/2025     HISTORY: Renal failure.     Right kidney measures 9.7 cm in length. Left kidney measures 10.4 cm in  length. Left kidney has a slightly lobular contour likely of no  significance. No solid renal masses, stones or hydronephrosis is seen.  Urinary bladder is unremarkable.       Impression:      1. No acute process identified on bilateral renal ultrasound        This report was finalized on 5/22/2025 4:37 PM by Dr. Andriy Man M.D on Workstation: [id number]       CT Outside Films [[id number]] Resulted: 05/22/25 1555     Updated: 05/22/25 1555    Narrative:      This procedure was auto-finalized with no dictation required.          Scan on 5/21/2025 1515  by [employer], [place]: ECG 12-LEAD         Author: -- Service: -- Author Type: --   Filed: Date of Service: Creation Time:   Status: (Other)   HEART RATE=71  bpm  RR Qmckdysq=642  ms  WY Ldgbxtuv=894  ms  P Horizontal Axis=4  deg  P Front Axis=25  deg  QRSD Interval=93  ms  QT Thmlrguj=032  ms  ESwB=474  ms  QRS Axis=-21  deg  T Wave Axis=33  deg  - OTHERWISE NORMAL ECG -  Sinus rhythm  Borderline  left axis deviation  Abnormal R-wave progression, early transition  No change from prior tracing          Current Facility-Administered Medications:     acetaminophen (TYLENOL) tablet 650 mg, 650 mg, Oral, Q6H PRN, Atilio Melendrez MD, 650 mg at 05/23/25 0953    allopurinol (ZYLOPRIM) tablet 100 mg, 100 mg, Oral, Daily, Atilio Melendrez MD, 100 mg at 05/23/25 0953    amLODIPine (NORVASC) tablet 10 mg, 10 mg, Oral, Daily, Atilio Melendrez MD, 10 mg at 05/23/25 0953    aspirin chewable tablet 81 mg, 81 mg, Oral, Daily, Atilio Melendrez MD, 81 mg at 05/23/25 0953    atorvastatin (LIPITOR) tablet 10 mg, 10 mg, Oral, Nightly, Atilio Melendrez MD, 10 mg at 05/22/25 2106    carvedilol (COREG) tablet 12.5 mg, 12.5 mg, Oral, BID With Meals, Atilio Melendrez MD, 12.5 mg at 05/23/25 0953    clopidogrel (PLAVIX) tablet 75 mg, 75 mg, Oral, Daily, Atilio Melendrez MD, 75 mg at 05/23/25 0953    famotidine (PEPCID) tablet 20 mg, 20 mg, Oral, BID, Atilio Melendrez MD, 20 mg at 05/23/25 0953    insulin lispro (HUMALOG/ADMELOG) injection 2-7 Units, 2-7 Units, Subcutaneous, 4x Daily AC & at Bedtime, Atilio Melendrez MD    isosorbide mononitrate (IMDUR) 24 hr tablet 30 mg, 30 mg, Oral, Q24H, Atilio Melendrez MD, 30 mg at 05/23/25 1107    levETIRAcetam (KEPPRA) tablet 500 mg, 500 mg, Oral, BID, Atilio Melendrez MD, 500 mg at 05/23/25 0953    montelukast (SINGULAIR) tablet 10 mg, 10 mg, Oral, Nightly, Atilio Melendrez MD, 10 mg at 05/22/25 2100    sodium chloride 0.9 % infusion, 75 mL/hr, Intravenous, Continuous, Rick Stallworth MD, Last Rate: 75 mL/hr at 05/22/25 1435, 75  mL/hr at 05/22/25 1435    traZODone (DESYREL) tablet 100 mg, 100 mg, Oral, Nightly, Jaime Melendrez MD, 100 mg at 05/22/25 2059     ASSESSMENT  Acute kidney injury resolving  Diabetes mellitus  Hypertension  Hyperlipidemia  Seizure disorder  History of CVA with  dysarthria  Depression  Chronic kidney disease stage III  Gastroesophageal reflux disease    PLAN  CPM  Continue IVF  Continue to hold chlorthalidone Cozaar and Glucophage  Nephrology consult appreciated  Adjust home medications  Stress ulcer DVT prophylaxis  Supportive care  PT OT  Discussed with nursing staff  Follow closely further recommendation according to hospital course    JAIME MELENDREZ MD    Copied text in this note has been reviewed and is accurate as of 05/23/25

## 2025-05-24 LAB
ANION GAP SERPL CALCULATED.3IONS-SCNC: 8 MMOL/L (ref 5–15)
BASOPHILS # BLD AUTO: 0.05 10*3/MM3 (ref 0–0.2)
BASOPHILS NFR BLD AUTO: 1 % (ref 0–1.5)
BUN SERPL-MCNC: 12 MG/DL (ref 8–23)
BUN/CREAT SERPL: 7.1 (ref 7–25)
CALCIUM SPEC-SCNC: 8.9 MG/DL (ref 8.6–10.5)
CHLORIDE SERPL-SCNC: 101 MMOL/L (ref 98–107)
CO2 SERPL-SCNC: 25 MMOL/L (ref 22–29)
CREAT SERPL-MCNC: 1.7 MG/DL (ref 0.76–1.27)
DEPRECATED RDW RBC AUTO: 46.3 FL (ref 37–54)
EGFRCR SERPLBLD CKD-EPI 2021: 44.2 ML/MIN/1.73
EOSINOPHIL # BLD AUTO: 0.15 10*3/MM3 (ref 0–0.4)
EOSINOPHIL NFR BLD AUTO: 3.1 % (ref 0.3–6.2)
ERYTHROCYTE [DISTWIDTH] IN BLOOD BY AUTOMATED COUNT: 13.3 % (ref 12.3–15.4)
GLUCOSE BLDC GLUCOMTR-MCNC: 118 MG/DL (ref 70–130)
GLUCOSE BLDC GLUCOMTR-MCNC: 180 MG/DL (ref 70–130)
GLUCOSE BLDC GLUCOMTR-MCNC: 241 MG/DL (ref 70–130)
GLUCOSE BLDC GLUCOMTR-MCNC: 292 MG/DL (ref 70–130)
GLUCOSE BLDC GLUCOMTR-MCNC: 297 MG/DL (ref 70–130)
GLUCOSE SERPL-MCNC: 308 MG/DL (ref 65–99)
HCT VFR BLD AUTO: 30.9 % (ref 37.5–51)
HGB BLD-MCNC: 10.4 G/DL (ref 13–17.7)
IMM GRANULOCYTES # BLD AUTO: 0.03 10*3/MM3 (ref 0–0.05)
IMM GRANULOCYTES NFR BLD AUTO: 0.6 % (ref 0–0.5)
LYMPHOCYTES # BLD AUTO: 1.39 10*3/MM3 (ref 0.7–3.1)
LYMPHOCYTES NFR BLD AUTO: 28.4 % (ref 19.6–45.3)
MCH RBC QN AUTO: 31.9 PG (ref 26.6–33)
MCHC RBC AUTO-ENTMCNC: 33.7 G/DL (ref 31.5–35.7)
MCV RBC AUTO: 94.8 FL (ref 79–97)
MONOCYTES # BLD AUTO: 0.87 10*3/MM3 (ref 0.1–0.9)
MONOCYTES NFR BLD AUTO: 17.8 % (ref 5–12)
NEUTROPHILS NFR BLD AUTO: 2.41 10*3/MM3 (ref 1.7–7)
NEUTROPHILS NFR BLD AUTO: 49.1 % (ref 42.7–76)
NRBC BLD AUTO-RTO: 0 /100 WBC (ref 0–0.2)
PLATELET # BLD AUTO: 205 10*3/MM3 (ref 140–450)
PMV BLD AUTO: 11.7 FL (ref 6–12)
POTASSIUM SERPL-SCNC: 5 MMOL/L (ref 3.5–5.2)
POTASSIUM SERPL-SCNC: 5.1 MMOL/L (ref 3.5–5.2)
POTASSIUM SERPL-SCNC: 5.1 MMOL/L (ref 3.5–5.2)
POTASSIUM SERPL-SCNC: 5.9 MMOL/L (ref 3.5–5.2)
RBC # BLD AUTO: 3.26 10*6/MM3 (ref 4.14–5.8)
SODIUM SERPL-SCNC: 134 MMOL/L (ref 136–145)
WBC NRBC COR # BLD AUTO: 4.9 10*3/MM3 (ref 3.4–10.8)

## 2025-05-24 PROCEDURE — 82948 REAGENT STRIP/BLOOD GLUCOSE: CPT

## 2025-05-24 PROCEDURE — 63710000001 INSULIN REGULAR HUMAN PER 5 UNITS: Performed by: INTERNAL MEDICINE

## 2025-05-24 PROCEDURE — 80048 BASIC METABOLIC PNL TOTAL CA: CPT | Performed by: HOSPITALIST

## 2025-05-24 PROCEDURE — 63710000001 INSULIN LISPRO (HUMAN) PER 5 UNITS: Performed by: HOSPITALIST

## 2025-05-24 PROCEDURE — 84132 ASSAY OF SERUM POTASSIUM: CPT | Performed by: INTERNAL MEDICINE

## 2025-05-24 PROCEDURE — 25010000002 CALCIUM GLUCONATE-NACL 1-0.675 GM/50ML-% SOLUTION: Performed by: INTERNAL MEDICINE

## 2025-05-24 PROCEDURE — 63710000001 INSULIN GLARGINE PER 5 UNITS: Performed by: HOSPITALIST

## 2025-05-24 PROCEDURE — 85025 COMPLETE CBC W/AUTO DIFF WBC: CPT | Performed by: HOSPITALIST

## 2025-05-24 RX ORDER — GABAPENTIN 400 MG/1
800 CAPSULE ORAL 3 TIMES DAILY
Status: DISCONTINUED | OUTPATIENT
Start: 2025-05-24 | End: 2025-05-26 | Stop reason: HOSPADM

## 2025-05-24 RX ORDER — NITROGLYCERIN 0.4 MG/1
0.4 TABLET SUBLINGUAL
Status: DISCONTINUED | OUTPATIENT
Start: 2025-05-24 | End: 2025-05-26 | Stop reason: HOSPADM

## 2025-05-24 RX ORDER — CALCIUM GLUCONATE 20 MG/ML
1000 INJECTION, SOLUTION INTRAVENOUS ONCE
Status: COMPLETED | OUTPATIENT
Start: 2025-05-24 | End: 2025-05-24

## 2025-05-24 RX ORDER — HYDROCODONE BITARTRATE AND ACETAMINOPHEN 7.5; 325 MG/1; MG/1
1 TABLET ORAL EVERY 8 HOURS PRN
Refills: 0 | Status: DISCONTINUED | OUTPATIENT
Start: 2025-05-24 | End: 2025-05-26 | Stop reason: HOSPADM

## 2025-05-24 RX ORDER — CHLORTHALIDONE 25 MG/1
25 TABLET ORAL DAILY
Status: DISCONTINUED | OUTPATIENT
Start: 2025-05-24 | End: 2025-05-26 | Stop reason: HOSPADM

## 2025-05-24 RX ORDER — INSULIN LISPRO 100 [IU]/ML
5 INJECTION, SOLUTION INTRAVENOUS; SUBCUTANEOUS
Status: DISCONTINUED | OUTPATIENT
Start: 2025-05-24 | End: 2025-05-24

## 2025-05-24 RX ORDER — INSULIN LISPRO 100 [IU]/ML
3 INJECTION, SOLUTION INTRAVENOUS; SUBCUTANEOUS
Status: DISCONTINUED | OUTPATIENT
Start: 2025-05-24 | End: 2025-05-25

## 2025-05-24 RX ADMIN — ACETAMINOPHEN 650 MG: 325 TABLET ORAL at 18:34

## 2025-05-24 RX ADMIN — FAMOTIDINE 20 MG: 20 TABLET, FILM COATED ORAL at 22:36

## 2025-05-24 RX ADMIN — INSULIN LISPRO 3 UNITS: 100 INJECTION, SOLUTION INTRAVENOUS; SUBCUTANEOUS at 13:47

## 2025-05-24 RX ADMIN — INSULIN GLARGINE 10 UNITS: 100 INJECTION, SOLUTION SUBCUTANEOUS at 22:39

## 2025-05-24 RX ADMIN — LEVETIRACETAM 500 MG: 500 TABLET, FILM COATED ORAL at 22:36

## 2025-05-24 RX ADMIN — FAMOTIDINE 20 MG: 20 TABLET, FILM COATED ORAL at 08:23

## 2025-05-24 RX ADMIN — LEVETIRACETAM 500 MG: 500 TABLET, FILM COATED ORAL at 08:23

## 2025-05-24 RX ADMIN — INSULIN LISPRO 3 UNITS: 100 INJECTION, SOLUTION INTRAVENOUS; SUBCUTANEOUS at 18:34

## 2025-05-24 RX ADMIN — ATORVASTATIN CALCIUM 10 MG: 10 TABLET ORAL at 22:36

## 2025-05-24 RX ADMIN — CALCIUM GLUCONATE 1000 MG: 20 INJECTION, SOLUTION INTRAVENOUS at 11:06

## 2025-05-24 RX ADMIN — INSULIN HUMAN 10 UNITS: 100 INJECTION, SOLUTION PARENTERAL at 11:03

## 2025-05-24 RX ADMIN — TRAZODONE HYDROCHLORIDE 100 MG: 100 TABLET ORAL at 22:36

## 2025-05-24 RX ADMIN — INSULIN LISPRO 4 UNITS: 100 INJECTION, SOLUTION INTRAVENOUS; SUBCUTANEOUS at 08:24

## 2025-05-24 RX ADMIN — CARVEDILOL 12.5 MG: 12.5 TABLET, FILM COATED ORAL at 18:34

## 2025-05-24 RX ADMIN — CARVEDILOL 12.5 MG: 12.5 TABLET, FILM COATED ORAL at 08:22

## 2025-05-24 RX ADMIN — MONTELUKAST 10 MG: 10 TABLET, FILM COATED ORAL at 22:36

## 2025-05-24 RX ADMIN — GABAPENTIN 800 MG: 400 CAPSULE ORAL at 22:41

## 2025-05-24 RX ADMIN — CHLORTHALIDONE 25 MG: 25 TABLET ORAL at 13:48

## 2025-05-24 RX ADMIN — ASPIRIN 81 MG CHEWABLE TABLET 81 MG: 81 TABLET CHEWABLE at 08:22

## 2025-05-24 RX ADMIN — AMLODIPINE BESYLATE 10 MG: 10 TABLET ORAL at 08:22

## 2025-05-24 RX ADMIN — CLOPIDOGREL BISULFATE 75 MG: 75 TABLET, FILM COATED ORAL at 08:23

## 2025-05-24 RX ADMIN — SODIUM ZIRCONIUM CYCLOSILICATE 10 G: 10 POWDER, FOR SUSPENSION ORAL at 11:06

## 2025-05-24 RX ADMIN — ISOSORBIDE MONONITRATE 30 MG: 30 TABLET, EXTENDED RELEASE ORAL at 08:22

## 2025-05-24 RX ADMIN — ALLOPURINOL 100 MG: 100 TABLET ORAL at 08:22

## 2025-05-24 NOTE — PROGRESS NOTES
"   LOS: 2 days     Chief Complaint/ Reason for encounter: MARLON, CKD    Subjective   05/23/25 : Patient is doing well today with no new complaints  Good appetite with no nausea or vomiting  No shortness of breath chest pain or edema  Voiding well with no dysuria  Feeling better, less confused, no complaints    5/24: Resting no new complaints    Medical history reviewed:  History of Present Illness    Subjective    History taken from: Chart and patient/family as able    Vital Signs  Temp:  [97.9 °F (36.6 °C)-98.8 °F (37.1 °C)] 97.9 °F (36.6 °C)  Heart Rate:  [71-76] 71  Resp:  [18] 18  BP: (149-173)/(73-85) 173/79       Wt Readings from Last 1 Encounters:   05/21/25 2331 88 kg (194 lb 0.1 oz)   05/21/25 1508 84.2 kg (185 lb 10 oz)       Objective:  Vital signs: (most recent): Blood pressure 173/79, pulse 71, temperature 97.9 °F (36.6 °C), temperature source Oral, resp. rate 18, height 165.1 cm (65\"), weight 88 kg (194 lb 0.1 oz), SpO2 95%.                Objective:  General Appearance:  Comfortable, well-appearing, no acute distress   HEENT: Mucous membranes moist, atraumatic  Lungs:  Normal effort and normal respiratory rate.  Breath sounds clear to auscultation: No rhonchi/Rales.  No  respiratory distress.   Heart:  S1, S2 normal.   Abdomen: Abdomen is soft, nontender/nondistended  Extremities: No edema of bilateral lower extremities  Skin:  Warm and dry       Results Review:    Intake/Output:     Intake/Output Summary (Last 24 hours) at 5/24/2025 1018  Last data filed at 5/24/2025 0820  Gross per 24 hour   Intake 360 ml   Output --   Net 360 ml         DATA:  Radiology and Labs:  The following labs independently reviewed by me. Additional labs ordered for tomorrow a.m.  Interval notes, chart personally reviewed by me.   Old records independently reviewed showing CKD 3 baseline creatinine around 1.4.  The following radiologic studies independently viewed by me, findings renal ultrasound showed borderline small " kidneys but no hydronephrosis masses or stones  New problems include MARLON on CKD  Discussed with patient himself at bedside    Risk/ complexity of medical care/ medical decision making high complexity, MARLON management      Labs:   Recent Results (from the past 24 hours)   POC Glucose Once    Collection Time: 05/23/25 11:27 AM    Specimen: Blood   Result Value Ref Range    Glucose 158 (H) 70 - 130 mg/dL   POC Glucose Once    Collection Time: 05/23/25  4:29 PM    Specimen: Blood   Result Value Ref Range    Glucose 237 (H) 70 - 130 mg/dL   POC Glucose Once    Collection Time: 05/23/25  8:06 PM    Specimen: Blood   Result Value Ref Range    Glucose 356 (H) 70 - 130 mg/dL   Basic Metabolic Panel    Collection Time: 05/24/25  5:48 AM    Specimen: Blood   Result Value Ref Range    Glucose 308 (H) 65 - 99 mg/dL    BUN 12 8 - 23 mg/dL    Creatinine 1.70 (H) 0.76 - 1.27 mg/dL    Sodium 134 (L) 136 - 145 mmol/L    Potassium 5.9 (H) 3.5 - 5.2 mmol/L    Chloride 101 98 - 107 mmol/L    CO2 25.0 22.0 - 29.0 mmol/L    Calcium 8.9 8.6 - 10.5 mg/dL    BUN/Creatinine Ratio 7.1 7.0 - 25.0    Anion Gap 8.0 5.0 - 15.0 mmol/L    eGFR 44.2 (L) >60.0 mL/min/1.73   CBC Auto Differential    Collection Time: 05/24/25  5:48 AM    Specimen: Blood   Result Value Ref Range    WBC 4.90 3.40 - 10.80 10*3/mm3    RBC 3.26 (L) 4.14 - 5.80 10*6/mm3    Hemoglobin 10.4 (L) 13.0 - 17.7 g/dL    Hematocrit 30.9 (L) 37.5 - 51.0 %    MCV 94.8 79.0 - 97.0 fL    MCH 31.9 26.6 - 33.0 pg    MCHC 33.7 31.5 - 35.7 g/dL    RDW 13.3 12.3 - 15.4 %    RDW-SD 46.3 37.0 - 54.0 fl    MPV 11.7 6.0 - 12.0 fL    Platelets 205 140 - 450 10*3/mm3    Neutrophil % 49.1 42.7 - 76.0 %    Lymphocyte % 28.4 19.6 - 45.3 %    Monocyte % 17.8 (H) 5.0 - 12.0 %    Eosinophil % 3.1 0.3 - 6.2 %    Basophil % 1.0 0.0 - 1.5 %    Immature Grans % 0.6 (H) 0.0 - 0.5 %    Neutrophils, Absolute 2.41 1.70 - 7.00 10*3/mm3    Lymphocytes, Absolute 1.39 0.70 - 3.10 10*3/mm3    Monocytes, Absolute 0.87  0.10 - 0.90 10*3/mm3    Eosinophils, Absolute 0.15 0.00 - 0.40 10*3/mm3    Basophils, Absolute 0.05 0.00 - 0.20 10*3/mm3    Immature Grans, Absolute 0.03 0.00 - 0.05 10*3/mm3    nRBC 0.0 0.0 - 0.2 /100 WBC   POC Glucose Once    Collection Time: 05/24/25  5:55 AM    Specimen: Blood   Result Value Ref Range    Glucose 292 (H) 70 - 130 mg/dL       Radiology:  Pertinent radiology studies were reviewed as described above      Medications have been reviewed separately in chart review medication tab      ASSESSMENT:  MARLON, prerenal with hypotension contributing.  Improving with fluids  CKD stage IIIa, baseline creatinine around 1.3  Hypotension, better with fluids  Diabetes mellitus type II, poorly controlled, A1c 13  Altered mental status, improved    MARLON (acute kidney injury)  Anemia of CKD        DISCUSSION/PLAN:   Renal function about the same today, not far from baseline  Potassium is 5.9.  He has not received any potassium, ACE inhibitor/ARB  Will treat with Lokelma and low potassium diet  Recheck potassium later today, serial levels until normal  Renal ultrasound was unremarkable  Urine studies with glucosuria but minimal proteinuria  Resume chlorthalidone for hypertension which should help with his hyperkalemia as well       Rick Stallworth MD  [facility]   Office phone number: [phone]  Answering service phone number: [phone]    05/24/25  10:18 EDT    Dictation performed using Dragon dictation software

## 2025-05-24 NOTE — PROGRESS NOTES
"Daily progress note    Primary care physician      Subjective  Doing same with no new complaints    History of present illness  65-year-old male with history of diabetes hypertension CVA coronary artery disease chronic kidney disease who was recently discharged from [facility] presented to [facility] emergency room with altered mental status.  Patient evaluated in ER found to have acute kidney injury admitted for management.  At the time of examination he is awake and alert and give me a good history but dysarthric which he said is baseline and denies any headache dizziness chest pain shortness of breath abdominal pain nausea vomiting diarrhea.     REVIEW OF SYSTEMS   unremarkable except for weakness     PHYSICAL EXAM   Blood pressure 141/65, pulse 75, temperature 97.9 °F (36.6 °C), temperature source Oral, resp. rate 18, height 165.1 cm (65\"), weight 88 kg (194 lb 0.1 oz), SpO2 95%.    Constitutional:       General: He is not in acute distress.     Appearance: Normal appearance. He is not ill-appearing, toxic-appearing or diaphoretic.   HENT:      Head: Normocephalic and atraumatic.      Nose: Nose normal.      Mouth/Throat:      Mouth: Mucous membranes are dry.   Eyes:      Extraocular Movements: Extraocular movements intact.      Conjunctiva/sclera: Conjunctivae normal.      Pupils: Pupils are equal, round, and reactive to light.   Cardiovascular:      Rate and Rhythm: Normal rate and regular rhythm.      Pulses: Normal pulses.      Heart sounds: Normal heart sounds. No murmur heard.     No friction rub. No gallop.   Pulmonary:      Effort: Pulmonary effort is normal. No respiratory distress.      Breath sounds: Normal breath sounds. No stridor. No wheezing, rhonchi or rales.   Abdominal:      General: Abdomen is flat. There is no distension.      Palpations: Abdomen is soft.      Tenderness: There is abdominal tenderness  There is no guarding or rebound.   Musculoskeletal:      Cervical " back: Normal range of motion and neck supple.   Skin:     General: Skin is warm and dry.      Capillary Refill: Capillary refill takes less than 2 seconds.   Neurological:      General: No focal deficit present.      Mental Status: He is oriented to person, place, and time. Mental status is at baseline.   Psychiatric:         Mood and Affect: Mood normal.         Behavior: Behavior normal.         Thought Content: Thought content normal.         Judgment: Judgment normal.      LAB RESULTS  Lab Results (last 24 hours)       Procedure Component Value Units Date/Time    Potassium [[id number]] Collected: 05/24/25 1157    Specimen: Blood Updated: 05/24/25 1216    POC Glucose Once [[id number]]  (Abnormal) Collected: 05/24/25 1123    Specimen: Blood Updated: 05/24/25 1125     Glucose 297 mg/dL     Basic Metabolic Panel [[id number]]  (Abnormal) Collected: 05/24/25 0548    Specimen: Blood Updated: 05/24/25 0703     Glucose 308 mg/dL      BUN 12 mg/dL      Creatinine 1.70 mg/dL      Sodium 134 mmol/L      Potassium 5.9 mmol/L      Chloride 101 mmol/L      CO2 25.0 mmol/L      Calcium 8.9 mg/dL      BUN/Creatinine Ratio 7.1     Anion Gap 8.0 mmol/L      eGFR 44.2 mL/min/1.73     Narrative:      GFR Categories in Chronic Kidney Disease (CKD)              GFR Category          GFR (mL/min/1.73)    Interpretation  G1                    90 or greater        Normal or high (1)  G2                    60-89                Mild decrease (1)  G3a                   45-59                Mild to moderate decrease  G3b                   30-44                Moderate to severe decrease  G4                    15-29                Severe decrease  G5                    14 or less           Kidney failure    (1)In the absence of evidence of kidney disease, neither GFR category G1 or G2 fulfill the criteria for CKD.    eGFR calculation 2021 CKD-EPI creatinine equation, which does not include race as a factor    CBC & Differential [[id number]]   (Abnormal) Collected: 05/24/25 0548    Specimen: Blood Updated: 05/24/25 0639    Narrative:      The following orders were created for panel order CBC & Differential.  Procedure                               Abnormality         Status                     ---------                               -----------         ------                     CBC Auto Differential[[id number]]        Abnormal            Final result                 Please view results for these tests on the individual orders.    CBC Auto Differential [[id number]]  (Abnormal) Collected: 05/24/25 0548    Specimen: Blood Updated: 05/24/25 0639     WBC 4.90 10*3/mm3      RBC 3.26 10*6/mm3      Hemoglobin 10.4 g/dL      Hematocrit 30.9 %      MCV 94.8 fL      MCH 31.9 pg      MCHC 33.7 g/dL      RDW 13.3 %      RDW-SD 46.3 fl      MPV 11.7 fL      Platelets 205 10*3/mm3      Neutrophil % 49.1 %      Lymphocyte % 28.4 %      Monocyte % 17.8 %      Eosinophil % 3.1 %      Basophil % 1.0 %      Immature Grans % 0.6 %      Neutrophils, Absolute 2.41 10*3/mm3      Lymphocytes, Absolute 1.39 10*3/mm3      Monocytes, Absolute 0.87 10*3/mm3      Eosinophils, Absolute 0.15 10*3/mm3      Basophils, Absolute 0.05 10*3/mm3      Immature Grans, Absolute 0.03 10*3/mm3      nRBC 0.0 /100 WBC     POC Glucose Once [[id number]]  (Abnormal) Collected: 05/24/25 0555    Specimen: Blood Updated: 05/24/25 0556     Glucose 292 mg/dL     POC Glucose Once [[id number]]  (Abnormal) Collected: 05/23/25 2006    Specimen: Blood Updated: 05/23/25 2009     Glucose 356 mg/dL     POC Glucose Once [[id number]]  (Abnormal) Collected: 05/23/25 1629    Specimen: Blood Updated: 05/23/25 1630     Glucose 237 mg/dL           Imaging Results (Last 24 Hours)       ** No results found for the last 24 hours. **          Scan on 5/21/2025 1515 by [facility] [place]: ECG 12-LEAD         Author: -- Service: -- Author Type: --   Filed: Date of Service: Creation Time:   Status: (Other)   HEART RATE=71  bpm  RR  Qwbfzegm=262  ms  VA Ucmasqyr=598  ms  P Horizontal Axis=4  deg  P Front Axis=25  deg  QRSD Interval=93  ms  QT Hbvpjmza=671  ms  GLuZ=796  ms  QRS Axis=-21  deg  T Wave Axis=33  deg  - OTHERWISE NORMAL ECG -  Sinus rhythm  Borderline  left axis deviation  Abnormal R-wave progression, early transition  No change from prior tracing          Current Facility-Administered Medications:     acetaminophen (TYLENOL) tablet 650 mg, 650 mg, Oral, Q6H PRN, Atilio Melendrez MD, 650 mg at 05/23/25 0953    allopurinol (ZYLOPRIM) tablet 100 mg, 100 mg, Oral, Daily, Atilio Melendrez MD, 100 mg at 05/24/25 0822    amLODIPine (NORVASC) tablet 10 mg, 10 mg, Oral, Daily, Atilio Melendrez MD, 10 mg at 05/24/25 0822    aspirin chewable tablet 81 mg, 81 mg, Oral, Daily, Atilio Melendrez MD, 81 mg at 05/24/25 0822    atorvastatin (LIPITOR) tablet 10 mg, 10 mg, Oral, Nightly, Atilio Melendrez MD, 10 mg at 05/23/25 2234    carvedilol (COREG) tablet 12.5 mg, 12.5 mg, Oral, BID With Meals, Atilio Melendrez MD, 12.5 mg at 05/24/25 0822    chlorthalidone (HYGROTON) tablet 25 mg, 25 mg, Oral, Daily, Rick Stallworth MD    clopidogrel (PLAVIX) tablet 75 mg, 75 mg, Oral, Daily, Atilio Melendrez MD, 75 mg at 05/24/25 0823    famotidine (PEPCID) tablet 20 mg, 20 mg, Oral, BID, Atilio Melendrez MD, 20 mg at 05/24/25 0823    insulin lispro (HUMALOG/ADMELOG) injection 2-7 Units, 2-7 Units, Subcutaneous, 4x Daily AC & at Bedtime, Atilio Melendrez MD, 4 Units at 05/24/25 0824    insulin regular (humuLIN R,novoLIN R) injection 10 Units, 10 Units, Subcutaneous, Once, Rick Stallworth MD    isosorbide mononitrate (IMDUR) 24 hr tablet 30 mg, 30 mg, Oral, Q24H, Atilio Melendrez MD, 30 mg at 05/24/25 0822    levETIRAcetam (KEPPRA) tablet 500 mg, 500 mg, Oral, BID, Atilio Melendrez MD, 500 mg at 05/24/25 0823    montelukast (SINGULAIR) tablet 10 mg, 10 mg, Oral, Nightly, Atilio Melendrez MD, 10 mg at 05/23/25 2234    nitroglycerin (NITROSTAT) SL tablet 0.4 mg, 0.4 mg, Sublingual, Q5 Min  PRN, Rick Stallworth MD    traZODone (DESYREL) tablet 100 mg, 100 mg, Oral, Nightly, Jaime Melendrez MD, 100 mg at 05/23/25 9144     ASSESSMENT  Hyperkalemia  Acute kidney injury resolving  Diabetes mellitus  Hypertension  Hyperlipidemia  Seizure disorder  History of CVA with  dysarthria  Depression  Chronic kidney disease stage III  Gastroesophageal reflux disease    PLAN  CPM  Hyperkalemia protocol  Lokelma and low potassium diet  Continue to hold Cozaar and Glucophage  Nephrology to follow patient  Adjust home medications  Stress ulcer DVT prophylaxis  Supportive care  PT OT  Discussed with nursing staff  Follow closely further recommendation according to hospital course    JAIME MELENDREZ MD    Copied text in this note has been reviewed and is accurate as of 05/24/25

## 2025-05-25 LAB
ALBUMIN SERPL-MCNC: 3.7 G/DL (ref 3.5–5.2)
ANION GAP SERPL CALCULATED.3IONS-SCNC: 7.9 MMOL/L (ref 5–15)
BUN SERPL-MCNC: 16 MG/DL (ref 8–23)
BUN/CREAT SERPL: 9.2 (ref 7–25)
CALCIUM SPEC-SCNC: 9.2 MG/DL (ref 8.6–10.5)
CHLORIDE SERPL-SCNC: 103 MMOL/L (ref 98–107)
CO2 SERPL-SCNC: 23.1 MMOL/L (ref 22–29)
CREAT SERPL-MCNC: 1.73 MG/DL (ref 0.76–1.27)
EGFRCR SERPLBLD CKD-EPI 2021: 43.3 ML/MIN/1.73
GLUCOSE BLDC GLUCOMTR-MCNC: 171 MG/DL (ref 70–130)
GLUCOSE BLDC GLUCOMTR-MCNC: 212 MG/DL (ref 70–130)
GLUCOSE BLDC GLUCOMTR-MCNC: 310 MG/DL (ref 70–130)
GLUCOSE BLDC GLUCOMTR-MCNC: 442 MG/DL (ref 70–130)
GLUCOSE SERPL-MCNC: 189 MG/DL (ref 65–99)
PHOSPHATE SERPL-MCNC: 3 MG/DL (ref 2.5–4.5)
POTASSIUM SERPL-SCNC: 4.9 MMOL/L (ref 3.5–5.2)
POTASSIUM SERPL-SCNC: 5 MMOL/L (ref 3.5–5.2)
POTASSIUM SERPL-SCNC: 5 MMOL/L (ref 3.5–5.2)
POTASSIUM SERPL-SCNC: 5.1 MMOL/L (ref 3.5–5.2)
SODIUM SERPL-SCNC: 134 MMOL/L (ref 136–145)

## 2025-05-25 PROCEDURE — 63710000001 INSULIN LISPRO (HUMAN) PER 5 UNITS: Performed by: HOSPITALIST

## 2025-05-25 PROCEDURE — 82948 REAGENT STRIP/BLOOD GLUCOSE: CPT

## 2025-05-25 PROCEDURE — 84132 ASSAY OF SERUM POTASSIUM: CPT | Performed by: INTERNAL MEDICINE

## 2025-05-25 PROCEDURE — 80069 RENAL FUNCTION PANEL: CPT | Performed by: INTERNAL MEDICINE

## 2025-05-25 PROCEDURE — 63710000001 INSULIN GLARGINE PER 5 UNITS: Performed by: HOSPITALIST

## 2025-05-25 RX ORDER — INSULIN LISPRO 100 [IU]/ML
5 INJECTION, SOLUTION INTRAVENOUS; SUBCUTANEOUS
Status: DISCONTINUED | OUTPATIENT
Start: 2025-05-25 | End: 2025-05-26 | Stop reason: HOSPADM

## 2025-05-25 RX ADMIN — ATORVASTATIN CALCIUM 10 MG: 10 TABLET ORAL at 22:21

## 2025-05-25 RX ADMIN — GABAPENTIN 800 MG: 400 CAPSULE ORAL at 16:25

## 2025-05-25 RX ADMIN — ALLOPURINOL 100 MG: 100 TABLET ORAL at 10:23

## 2025-05-25 RX ADMIN — LEVETIRACETAM 500 MG: 500 TABLET, FILM COATED ORAL at 10:23

## 2025-05-25 RX ADMIN — INSULIN GLARGINE 15 UNITS: 100 INJECTION, SOLUTION SUBCUTANEOUS at 22:22

## 2025-05-25 RX ADMIN — TRAZODONE HYDROCHLORIDE 100 MG: 100 TABLET ORAL at 22:21

## 2025-05-25 RX ADMIN — INSULIN LISPRO 7 UNITS: 100 INJECTION, SOLUTION INTRAVENOUS; SUBCUTANEOUS at 16:25

## 2025-05-25 RX ADMIN — ISOSORBIDE MONONITRATE 30 MG: 30 TABLET, EXTENDED RELEASE ORAL at 10:23

## 2025-05-25 RX ADMIN — INSULIN LISPRO 3 UNITS: 100 INJECTION, SOLUTION INTRAVENOUS; SUBCUTANEOUS at 13:00

## 2025-05-25 RX ADMIN — INSULIN LISPRO 2 UNITS: 100 INJECTION, SOLUTION INTRAVENOUS; SUBCUTANEOUS at 22:21

## 2025-05-25 RX ADMIN — INSULIN LISPRO 5 UNITS: 100 INJECTION, SOLUTION INTRAVENOUS; SUBCUTANEOUS at 16:24

## 2025-05-25 RX ADMIN — CLOPIDOGREL BISULFATE 75 MG: 75 TABLET, FILM COATED ORAL at 10:23

## 2025-05-25 RX ADMIN — AMLODIPINE BESYLATE 10 MG: 10 TABLET ORAL at 10:24

## 2025-05-25 RX ADMIN — CARVEDILOL 12.5 MG: 12.5 TABLET, FILM COATED ORAL at 16:25

## 2025-05-25 RX ADMIN — GABAPENTIN 800 MG: 400 CAPSULE ORAL at 22:21

## 2025-05-25 RX ADMIN — CARVEDILOL 12.5 MG: 12.5 TABLET, FILM COATED ORAL at 10:24

## 2025-05-25 RX ADMIN — FAMOTIDINE 20 MG: 20 TABLET, FILM COATED ORAL at 22:22

## 2025-05-25 RX ADMIN — FAMOTIDINE 20 MG: 20 TABLET, FILM COATED ORAL at 10:24

## 2025-05-25 RX ADMIN — LEVETIRACETAM 500 MG: 500 TABLET, FILM COATED ORAL at 22:21

## 2025-05-25 RX ADMIN — MONTELUKAST 10 MG: 10 TABLET, FILM COATED ORAL at 22:21

## 2025-05-25 RX ADMIN — INSULIN LISPRO 3 UNITS: 100 INJECTION, SOLUTION INTRAVENOUS; SUBCUTANEOUS at 10:24

## 2025-05-25 RX ADMIN — CHLORTHALIDONE 25 MG: 25 TABLET ORAL at 10:23

## 2025-05-25 RX ADMIN — INSULIN LISPRO 4 UNITS: 100 INJECTION, SOLUTION INTRAVENOUS; SUBCUTANEOUS at 13:00

## 2025-05-25 RX ADMIN — ASPIRIN 81 MG CHEWABLE TABLET 81 MG: 81 TABLET CHEWABLE at 10:23

## 2025-05-25 RX ADMIN — GABAPENTIN 800 MG: 400 CAPSULE ORAL at 10:24

## 2025-05-25 NOTE — PROGRESS NOTES
"Daily progress note    Primary care physician      Subjective  Doing same with no new complaints     History of present illness  65-year-old male with history of diabetes hypertension CVA coronary artery disease chronic kidney disease who was recently discharged from [facility] presented to [facility] emergency room with altered mental status.  Patient evaluated in ER found to have acute kidney injury admitted for management.  At the time of examination he is awake and alert and give me a good history but dysarthric which he said is baseline and denies any headache dizziness chest pain shortness of breath abdominal pain nausea vomiting diarrhea.     REVIEW OF SYSTEMS  Unremarkable except for weakness     PHYSICAL EXAM   Blood pressure 117/63, pulse 65, temperature 98 °F (36.7 °C), temperature source Oral, resp. rate 18, height 165.1 cm (65\"), weight 88 kg (194 lb 0.1 oz), SpO2 95%.    Constitutional:       General: He is not in acute distress.     Appearance: Normal appearance. He is not ill-appearing, toxic-appearing or diaphoretic.   HENT:      Head: Normocephalic and atraumatic.      Nose: Nose normal.      Mouth/Throat:      Mouth: Mucous membranes are dry.   Eyes:      Extraocular Movements: Extraocular movements intact.      Conjunctiva/sclera: Conjunctivae normal.      Pupils: Pupils are equal, round, and reactive to light.   Cardiovascular:      Rate and Rhythm: Normal rate and regular rhythm.      Pulses: Normal pulses.      Heart sounds: Normal heart sounds. No murmur heard.     No friction rub. No gallop.   Pulmonary:      Effort: Pulmonary effort is normal. No respiratory distress.      Breath sounds: Normal breath sounds. No stridor. No wheezing, rhonchi or rales.   Abdominal:      General: Abdomen is flat. There is no distension.      Palpations: Abdomen is soft.      Tenderness: There is abdominal tenderness  There is no guarding or rebound.   Musculoskeletal:      Cervical " back: Normal range of motion and neck supple.   Skin:     General: Skin is warm and dry.      Capillary Refill: Capillary refill takes less than 2 seconds.   Neurological:      General: No focal deficit present.      Mental Status: He is oriented to person, place, and time. Mental status is at baseline.   Psychiatric:         Mood and Affect: Mood normal.         Behavior: Behavior normal.         Thought Content: Thought content normal.         Judgment: Judgment normal.      LAB RESULTS  Lab Results (last 24 hours)       Procedure Component Value Units Date/Time    POC Glucose Once [[id number]]  (Abnormal) Collected: 05/25/25 1111    Specimen: Blood Updated: 05/25/25 1113     Glucose 310 mg/dL     Potassium [[id number]]  (Normal) Collected: 05/25/25 0800    Specimen: Blood Updated: 05/25/25 0836     Potassium 4.9 mmol/L     POC Glucose Once [[id number]]  (Abnormal) Collected: 05/25/25 0605    Specimen: Blood Updated: 05/25/25 0608     Glucose 212 mg/dL     Potassium [[id number]]  (Normal) Collected: 05/25/25 0425    Specimen: Blood Updated: 05/25/25 0501     Potassium 5.0 mmol/L     Renal Function Panel [[id number]]  (Abnormal) Collected: 05/25/25 0425    Specimen: Blood Updated: 05/25/25 0501     Glucose 189 mg/dL      BUN 16 mg/dL      Creatinine 1.73 mg/dL      Sodium 134 mmol/L      Potassium 5.0 mmol/L      Chloride 103 mmol/L      CO2 23.1 mmol/L      Calcium 9.2 mg/dL      Albumin 3.7 g/dL      Phosphorus 3.0 mg/dL      Anion Gap 7.9 mmol/L      BUN/Creatinine Ratio 9.2     eGFR 43.3 mL/min/1.73     Narrative:      GFR Categories in Chronic Kidney Disease (CKD)              GFR Category          GFR (mL/min/1.73)    Interpretation  G1                    90 or greater        Normal or high (1)  G2                    60-89                Mild decrease (1)  G3a                   45-59                Mild to moderate decrease  G3b                   30-44                Moderate to severe decrease  G4                     15-29                Severe decrease  G5                    14 or less           Kidney failure    (1)In the absence of evidence of kidney disease, neither GFR category G1 or G2 fulfill the criteria for CKD.    eGFR calculation 2021 CKD-EPI creatinine equation, which does not include race as a factor    Potassium [[id number]]  (Normal) Collected: 05/24/25 2349    Specimen: Blood Updated: 05/25/25 0029     Potassium 5.1 mmol/L      Comment: Slight hemolysis detected by analyzer. Result may be falsely elevated.       POC Glucose Once [[id number]]  (Abnormal) Collected: 05/24/25 2039    Specimen: Blood Updated: 05/24/25 2040     Glucose 180 mg/dL     Potassium [[id number]]  (Normal) Collected: 05/24/25 2004    Specimen: Blood from Arm, Left Updated: 05/24/25 2032     Potassium 5.1 mmol/L     Potassium [[id number]]  (Normal) Collected: 05/24/25 1616    Specimen: Blood from Hand, Right Updated: 05/24/25 1643     Potassium 5.1 mmol/L     POC Glucose Once [[id number]]  (Normal) Collected: 05/24/25 1616    Specimen: Blood Updated: 05/24/25 1618     Glucose 118 mg/dL           Imaging Results (Last 24 Hours)       ** No results found for the last 24 hours. **          [facility] 5/21/2025 1515 [facility] [place]: ECG 12-LEAD         Author: -- Service: -- Author Type: --   Filed: Date of Service: Creation Time:   Status: (Other)   HEART RATE=71  bpm  RR Mfacildh=845  ms  MI Bgzetkkg=682  ms  P Horizontal Axis=4  deg  P Front Axis=25  deg  QRSD Interval=93  ms  QT Goyomjbi=389  ms  LXaF=723  ms  QRS Axis=-21  deg  T Wave Axis=33  deg  - OTHERWISE NORMAL ECG -  Sinus rhythm  Borderline  left axis deviation  Abnormal R-wave progression, early transition  No change from prior tracing          Current Facility-Administered Medications:     acetaminophen (TYLENOL) tablet 650 mg, 650 mg, Oral, Q6H PRN, Atilio Melendrez MD, 650 mg at 05/24/25 1834    allopurinol (ZYLOPRIM) tablet 100 mg, 100 mg, Oral, Daily, Atilio Melendrez MD, 100 mg at  05/25/25 1023    amLODIPine (NORVASC) tablet 10 mg, 10 mg, Oral, Daily, Atilio Melendrez MD, 10 mg at 05/25/25 1024    aspirin chewable tablet 81 mg, 81 mg, Oral, Daily, Atilio Melendrez MD, 81 mg at 05/25/25 1023    atorvastatin (LIPITOR) tablet 10 mg, 10 mg, Oral, Nightly, Atilio Melendrez MD, 10 mg at 05/24/25 2236    carvedilol (COREG) tablet 12.5 mg, 12.5 mg, Oral, BID With Meals, Atilio Melendrez MD, 12.5 mg at 05/25/25 1024    chlorthalidone (HYGROTON) tablet 25 mg, 25 mg, Oral, Daily, Rick Stallworth MD, 25 mg at 05/25/25 1023    clopidogrel (PLAVIX) tablet 75 mg, 75 mg, Oral, Daily, Atilio Melendrez MD, 75 mg at 05/25/25 1023    famotidine (PEPCID) tablet 20 mg, 20 mg, Oral, BID, Atilio Melendrez MD, 20 mg at 05/25/25 1024    gabapentin (NEURONTIN) capsule 800 mg, 800 mg, Oral, TID, Atilio Melendrez MD, 800 mg at 05/25/25 1024    HYDROcodone-acetaminophen (NORCO) 7.5-325 MG per tablet 1 tablet, 1 tablet, Oral, Q8H PRN, Atilio Melendrez MD    insulin glargine (LANTUS, SEMGLEE) injection 10 Units, 10 Units, Subcutaneous, Nightly, Atilio Melendrez MD, 10 Units at 05/24/25 2239    insulin lispro (HUMALOG/ADMELOG) injection 2-7 Units, 2-7 Units, Subcutaneous, 4x Daily AC & at Bedtime, Atilio Melendrez MD, 3 Units at 05/25/25 1024    insulin lispro (HUMALOG/ADMELOG) injection 3 Units, 3 Units, Subcutaneous, TID With Meals, Atilio Melendrez MD, 3 Units at 05/25/25 1024    isosorbide mononitrate (IMDUR) 24 hr tablet 30 mg, 30 mg, Oral, Q24H, Atilio Melendrez MD, 30 mg at 05/25/25 1023    levETIRAcetam (KEPPRA) tablet 500 mg, 500 mg, Oral, BID, Atilio Melendrez MD, 500 mg at 05/25/25 1023    montelukast (SINGULAIR) tablet 10 mg, 10 mg, Oral, Nightly, Atilio Melendrez MD, 10 mg at 05/24/25 2236    nitroglycerin (NITROSTAT) SL tablet 0.4 mg, 0.4 mg, Sublingual, Q5 Min PRN, Rick Stallworth MD    traZODone (DESYREL) tablet 100 mg, 100 mg, Oral, Nightly, Atilio Melendrez MD, 100 mg at 05/24/25 2236     ASSESSMENT  Hyperkalemia corrected  Acute kidney  injury resolving  Diabetes mellitus  Hypertension  Hyperlipidemia  Seizure disorder  History of CVA with  dysarthria  Depression  Chronic kidney disease stage III  Gastroesophageal reflux disease    PLAN  CPM  Resume diuretics per nephrology  Continue to hold Cozaar and Glucophage  Nephrology to follow patient  Adjust home medications  Stress ulcer DVT prophylaxis  Supportive care  PT OT  Discussed with nursing staff  Discharge planning    JAIME PARK MD    Copied text in this note has been reviewed and is accurate as of 05/25/25

## 2025-05-25 NOTE — PROGRESS NOTES
"   LOS: 3 days     Chief Complaint/ Reason for encounter: MARLON, CKD    Subjective   05/23/25 : Patient is doing well today with no new complaints  Good appetite with no nausea or vomiting  No shortness of breath chest pain or edema  Voiding well with no dysuria  Feeling better, less confused, no complaints    5/24: Resting no new complaints    5/25: He is resting in bed denies any complaints  Good appetite no nausea or vomiting no shortness of breath chest pain fevers chills or edema    Medical history reviewed:  History of Present Illness    Subjective    History taken from: Chart and patient/family as able    Vital Signs  Temp:  [98 °F (36.7 °C)-98.4 °F (36.9 °C)] 98.4 °F (36.9 °C)  Heart Rate:  [63-71] 63  Resp:  [18] 18  BP: (133-140)/(59-75) 137/59       Wt Readings from Last 1 Encounters:   05/21/25 2331 88 kg (194 lb 0.1 oz)   05/21/25 1508 84.2 kg (185 lb 10 oz)       Objective:  Vital signs: (most recent): Blood pressure 137/59, pulse 63, temperature 98.4 °F (36.9 °C), temperature source Oral, resp. rate 18, height 165.1 cm (65\"), weight 88 kg (194 lb 0.1 oz), SpO2 98%.                Objective:  General Appearance:  Comfortable, well-appearing, no acute distress   HEENT: Mucous membranes moist, atraumatic  Lungs:  Normal effort and normal respiratory rate.  Breath sounds clear to auscultation: No rhonchi/Rales.  No  respiratory distress.   Heart:  S1, S2 normal.   Abdomen: Abdomen is soft, nontender/nondistended  Extremities: No edema of bilateral lower extremities  Skin:  Warm and dry       Results Review:    Intake/Output:     Intake/Output Summary (Last 24 hours) at 5/25/2025 1151  Last data filed at 5/24/2025 2054  Gross per 24 hour   Intake 720 ml   Output --   Net 720 ml         DATA:  Radiology and Labs:  The following labs independently reviewed by me. Additional labs ordered for tomorrow a.m.  Interval notes, chart personally reviewed by me.   Old records independently reviewed showing CKD 3 " baseline creatinine around 1.4.  Discussed with patient himself at bedside    Risk/ complexity of medical care/ medical decision making moderate complexity of CKD and electrolyte management      Labs:   Recent Results (from the past 24 hours)   Potassium    Collection Time: 05/24/25 11:57 AM    Specimen: Blood   Result Value Ref Range    Potassium 5.0 3.5 - 5.2 mmol/L   POC Glucose Once    Collection Time: 05/24/25  1:38 PM    Specimen: Blood   Result Value Ref Range    Glucose 241 (H) 70 - 130 mg/dL   Potassium    Collection Time: 05/24/25  4:16 PM    Specimen: Hand, Right; Blood   Result Value Ref Range    Potassium 5.1 3.5 - 5.2 mmol/L   POC Glucose Once    Collection Time: 05/24/25  4:16 PM    Specimen: Blood   Result Value Ref Range    Glucose 118 70 - 130 mg/dL   Potassium    Collection Time: 05/24/25  8:04 PM    Specimen: Arm, Left; Blood   Result Value Ref Range    Potassium 5.1 3.5 - 5.2 mmol/L   POC Glucose Once    Collection Time: 05/24/25  8:39 PM    Specimen: Blood   Result Value Ref Range    Glucose 180 (H) 70 - 130 mg/dL   Potassium    Collection Time: 05/24/25 11:49 PM    Specimen: Blood   Result Value Ref Range    Potassium 5.1 3.5 - 5.2 mmol/L   Potassium    Collection Time: 05/25/25  4:25 AM    Specimen: Blood   Result Value Ref Range    Potassium 5.0 3.5 - 5.2 mmol/L   Renal Function Panel    Collection Time: 05/25/25  4:25 AM    Specimen: Blood   Result Value Ref Range    Glucose 189 (H) 65 - 99 mg/dL    BUN 16 8 - 23 mg/dL    Creatinine 1.73 (H) 0.76 - 1.27 mg/dL    Sodium 134 (L) 136 - 145 mmol/L    Potassium 5.0 3.5 - 5.2 mmol/L    Chloride 103 98 - 107 mmol/L    CO2 23.1 22.0 - 29.0 mmol/L    Calcium 9.2 8.6 - 10.5 mg/dL    Albumin 3.7 3.5 - 5.2 g/dL    Phosphorus 3.0 2.5 - 4.5 mg/dL    Anion Gap 7.9 5.0 - 15.0 mmol/L    BUN/Creatinine Ratio 9.2 7.0 - 25.0    eGFR 43.3 (L) >60.0 mL/min/1.73   POC Glucose Once    Collection Time: 05/25/25  6:05 AM    Specimen: Blood   Result Value Ref Range     Glucose 212 (H) 70 - 130 mg/dL   Potassium    Collection Time: 05/25/25  8:00 AM    Specimen: Blood   Result Value Ref Range    Potassium 4.9 3.5 - 5.2 mmol/L   POC Glucose Once    Collection Time: 05/25/25 11:11 AM    Specimen: Blood   Result Value Ref Range    Glucose 310 (H) 70 - 130 mg/dL       Radiology:  Pertinent radiology studies were reviewed as described above      Medications have been reviewed separately in chart review medication tab      ASSESSMENT:  MARLON, prerenal with hypotension, stabilizing  CKD stage IIIa, baseline creatinine around 1.3  Hypotension, better with fluids  Diabetes mellitus type II, poorly controlled, A1c 13  Altered mental status, improved    MARLON (acute kidney injury)  Anemia of CKD  Mild hyponatremia  Hyperkalemia, improved with medical treatment        DISCUSSION/PLAN:   Renal function remains about the same today, not far from baseline  Potassium now down to 4.9 after medical treatment  Continue low potassium diet  Renal ultrasound was unremarkable  Urine studies with glucosuria but minimal proteinuria  Resume chlorthalidone for hypertension which should help with his hyperkalemia as well  His blood pressure is well-controlled today  Follow-up labs, discharge planning       Rick Stallworth MD  [facility]   Office phone number: [phone]  Answering service phone number: [phone]    05/25/25  11:51 EDT    Dictation performed using Dragon dictation software

## 2025-05-25 NOTE — PLAN OF CARE
Goal Outcome Evaluation:  Plan of Care Reviewed With: patient        Progress: no change  Outcome Evaluation:     A&O x4.   Needs extra time for responses.   CVA hx.       SR on telemetry. Room air.       Up with assist x1 to restroom.       Accuchecks.           Will continue to monitor.

## 2025-05-26 ENCOUNTER — READMISSION MANAGEMENT (OUTPATIENT)
Dept: CALL CENTER | Facility: HOSPITAL | Age: 65
End: 2025-05-26
Payer: MEDICARE

## 2025-05-26 VITALS
BODY MASS INDEX: 32.32 KG/M2 | SYSTOLIC BLOOD PRESSURE: 123 MMHG | DIASTOLIC BLOOD PRESSURE: 69 MMHG | HEIGHT: 65 IN | WEIGHT: 194 LBS | RESPIRATION RATE: 16 BRPM | TEMPERATURE: 97.9 F | OXYGEN SATURATION: 96 % | HEART RATE: 68 BPM

## 2025-05-26 LAB
ALBUMIN SERPL-MCNC: 3.7 G/DL (ref 3.5–5.2)
ANION GAP SERPL CALCULATED.3IONS-SCNC: 8.7 MMOL/L (ref 5–15)
BASOPHILS # BLD AUTO: 0.06 10*3/MM3 (ref 0–0.2)
BASOPHILS NFR BLD AUTO: 0.9 % (ref 0–1.5)
BUN SERPL-MCNC: 19 MG/DL (ref 8–23)
BUN/CREAT SERPL: 11.4 (ref 7–25)
CALCIUM SPEC-SCNC: 9.3 MG/DL (ref 8.6–10.5)
CHLORIDE SERPL-SCNC: 103 MMOL/L (ref 98–107)
CO2 SERPL-SCNC: 25.3 MMOL/L (ref 22–29)
CREAT SERPL-MCNC: 1.67 MG/DL (ref 0.76–1.27)
DEPRECATED RDW RBC AUTO: 49 FL (ref 37–54)
EGFRCR SERPLBLD CKD-EPI 2021: 45.1 ML/MIN/1.73
EOSINOPHIL # BLD AUTO: 0.28 10*3/MM3 (ref 0–0.4)
EOSINOPHIL NFR BLD AUTO: 4.4 % (ref 0.3–6.2)
ERYTHROCYTE [DISTWIDTH] IN BLOOD BY AUTOMATED COUNT: 13.6 % (ref 12.3–15.4)
GLUCOSE BLDC GLUCOMTR-MCNC: 204 MG/DL (ref 70–130)
GLUCOSE BLDC GLUCOMTR-MCNC: 241 MG/DL (ref 70–130)
GLUCOSE SERPL-MCNC: 254 MG/DL (ref 65–99)
HCT VFR BLD AUTO: 32.8 % (ref 37.5–51)
HGB BLD-MCNC: 10.4 G/DL (ref 13–17.7)
IMM GRANULOCYTES # BLD AUTO: 0.03 10*3/MM3 (ref 0–0.05)
IMM GRANULOCYTES NFR BLD AUTO: 0.5 % (ref 0–0.5)
LYMPHOCYTES # BLD AUTO: 1.68 10*3/MM3 (ref 0.7–3.1)
LYMPHOCYTES NFR BLD AUTO: 26.5 % (ref 19.6–45.3)
MCH RBC QN AUTO: 31.6 PG (ref 26.6–33)
MCHC RBC AUTO-ENTMCNC: 31.7 G/DL (ref 31.5–35.7)
MCV RBC AUTO: 99.7 FL (ref 79–97)
MONOCYTES # BLD AUTO: 0.63 10*3/MM3 (ref 0.1–0.9)
MONOCYTES NFR BLD AUTO: 9.9 % (ref 5–12)
NEUTROPHILS NFR BLD AUTO: 3.66 10*3/MM3 (ref 1.7–7)
NEUTROPHILS NFR BLD AUTO: 57.8 % (ref 42.7–76)
NRBC BLD AUTO-RTO: 0 /100 WBC (ref 0–0.2)
PHOSPHATE SERPL-MCNC: 3.5 MG/DL (ref 2.5–4.5)
PLATELET # BLD AUTO: 200 10*3/MM3 (ref 140–450)
PMV BLD AUTO: 11.2 FL (ref 6–12)
POTASSIUM SERPL-SCNC: 5.2 MMOL/L (ref 3.5–5.2)
RBC # BLD AUTO: 3.29 10*6/MM3 (ref 4.14–5.8)
SODIUM SERPL-SCNC: 137 MMOL/L (ref 136–145)
WBC NRBC COR # BLD AUTO: 6.34 10*3/MM3 (ref 3.4–10.8)

## 2025-05-26 PROCEDURE — 63710000001 INSULIN LISPRO (HUMAN) PER 5 UNITS: Performed by: HOSPITALIST

## 2025-05-26 PROCEDURE — 85025 COMPLETE CBC W/AUTO DIFF WBC: CPT | Performed by: HOSPITALIST

## 2025-05-26 PROCEDURE — 80069 RENAL FUNCTION PANEL: CPT | Performed by: INTERNAL MEDICINE

## 2025-05-26 PROCEDURE — 82948 REAGENT STRIP/BLOOD GLUCOSE: CPT

## 2025-05-26 PROCEDURE — 97530 THERAPEUTIC ACTIVITIES: CPT

## 2025-05-26 RX ORDER — CARVEDILOL 12.5 MG/1
12.5 TABLET ORAL 2 TIMES DAILY WITH MEALS
Qty: 60 TABLET | Refills: 0 | Status: SHIPPED | OUTPATIENT
Start: 2025-05-26 | End: 2025-06-02 | Stop reason: HOSPADM

## 2025-05-26 RX ORDER — FAMOTIDINE 20 MG/1
20 TABLET, FILM COATED ORAL 2 TIMES DAILY
Qty: 60 TABLET | Refills: 0 | Status: SHIPPED | OUTPATIENT
Start: 2025-05-26 | End: 2025-05-26

## 2025-05-26 RX ORDER — ISOSORBIDE MONONITRATE 30 MG/1
30 TABLET, EXTENDED RELEASE ORAL
Qty: 30 TABLET | Refills: 0 | Status: SHIPPED | OUTPATIENT
Start: 2025-05-27 | End: 2025-06-02 | Stop reason: HOSPADM

## 2025-05-26 RX ORDER — ASPIRIN 81 MG/1
81 TABLET, CHEWABLE ORAL DAILY
Qty: 30 TABLET | Refills: 0 | Status: SHIPPED | OUTPATIENT
Start: 2025-05-27 | End: 2025-05-26

## 2025-05-26 RX ORDER — ISOSORBIDE MONONITRATE 30 MG/1
30 TABLET, EXTENDED RELEASE ORAL
Qty: 30 TABLET | Refills: 0 | Status: SHIPPED | OUTPATIENT
Start: 2025-05-27 | End: 2025-05-26

## 2025-05-26 RX ORDER — ASPIRIN 81 MG/1
81 TABLET, CHEWABLE ORAL DAILY
Qty: 30 TABLET | Refills: 0 | Status: SHIPPED | OUTPATIENT
Start: 2025-05-27 | End: 2025-06-26

## 2025-05-26 RX ORDER — CARVEDILOL 12.5 MG/1
12.5 TABLET ORAL 2 TIMES DAILY WITH MEALS
Qty: 60 TABLET | Refills: 0 | Status: SHIPPED | OUTPATIENT
Start: 2025-05-26 | End: 2025-05-26

## 2025-05-26 RX ORDER — FAMOTIDINE 20 MG/1
20 TABLET, FILM COATED ORAL 2 TIMES DAILY
Qty: 60 TABLET | Refills: 0 | Status: SHIPPED | OUTPATIENT
Start: 2025-05-26 | End: 2025-06-25

## 2025-05-26 RX ADMIN — ISOSORBIDE MONONITRATE 30 MG: 30 TABLET, EXTENDED RELEASE ORAL at 08:31

## 2025-05-26 RX ADMIN — CLOPIDOGREL BISULFATE 75 MG: 75 TABLET, FILM COATED ORAL at 08:32

## 2025-05-26 RX ADMIN — CHLORTHALIDONE 25 MG: 25 TABLET ORAL at 08:32

## 2025-05-26 RX ADMIN — ASPIRIN 81 MG CHEWABLE TABLET 81 MG: 81 TABLET CHEWABLE at 08:31

## 2025-05-26 RX ADMIN — INSULIN LISPRO 5 UNITS: 100 INJECTION, SOLUTION INTRAVENOUS; SUBCUTANEOUS at 13:56

## 2025-05-26 RX ADMIN — INSULIN LISPRO 5 UNITS: 100 INJECTION, SOLUTION INTRAVENOUS; SUBCUTANEOUS at 08:31

## 2025-05-26 RX ADMIN — INSULIN LISPRO 3 UNITS: 100 INJECTION, SOLUTION INTRAVENOUS; SUBCUTANEOUS at 13:56

## 2025-05-26 RX ADMIN — INSULIN LISPRO 3 UNITS: 100 INJECTION, SOLUTION INTRAVENOUS; SUBCUTANEOUS at 08:30

## 2025-05-26 RX ADMIN — ALLOPURINOL 100 MG: 100 TABLET ORAL at 08:31

## 2025-05-26 RX ADMIN — GABAPENTIN 800 MG: 400 CAPSULE ORAL at 08:31

## 2025-05-26 RX ADMIN — AMLODIPINE BESYLATE 10 MG: 10 TABLET ORAL at 08:31

## 2025-05-26 RX ADMIN — CARVEDILOL 12.5 MG: 12.5 TABLET, FILM COATED ORAL at 08:31

## 2025-05-26 RX ADMIN — LEVETIRACETAM 500 MG: 500 TABLET, FILM COATED ORAL at 08:31

## 2025-05-26 RX ADMIN — FAMOTIDINE 20 MG: 20 TABLET, FILM COATED ORAL at 08:32

## 2025-05-26 NOTE — PLAN OF CARE
Goal Outcome Evaluation:  Plan of Care Reviewed With: patient        Progress: improving  Outcome Evaluation: Pt A&Ox4, discharge home with family to transport, meds from out pt pharmacy, education for diabetic - carb diet , accu checks, up ad jordan, room air, SR on monitor and vitals as charted,

## 2025-05-26 NOTE — PROGRESS NOTES
"   LOS: 4 days     Chief Complaint/ Reason for encounter: MARLON, CKD    Subjective   05/23/25 : Patient is doing well today with no new complaints  Good appetite with no nausea or vomiting  No shortness of breath chest pain or edema  Voiding well with no dysuria  Feeling better, less confused, no complaints    5/24: Resting no new complaints    5/25: He is resting in bed denies any complaints  Good appetite no nausea or vomiting no shortness of breath chest pain fevers chills or edema    5/26: He looks and feels well today denies complaints, asking to go home  States good appetite no nausea vomiting shortness of breath chest pain fevers chills edema or dysuria    Medical history reviewed:  History of Present Illness    Subjective    History taken from: Chart and patient/family as able    Vital Signs  Temp:  [97.3 °F (36.3 °C)-98.4 °F (36.9 °C)] 98.4 °F (36.9 °C)  Heart Rate:  [62-79] 62  Resp:  [14-16] 16  BP: (117-137)/(59-75) 119/59       Wt Readings from Last 1 Encounters:   05/21/25 2331 88 kg (194 lb 0.1 oz)   05/21/25 1508 84.2 kg (185 lb 10 oz)       Objective:  Vital signs: (most recent): Blood pressure 119/59, pulse 62, temperature 98.4 °F (36.9 °C), temperature source Oral, resp. rate 16, height 165.1 cm (65\"), weight 88 kg (194 lb 0.1 oz), SpO2 98%.                Objective:  General Appearance:  Comfortable, well-appearing, no acute distress   HEENT: Mucous membranes moist, atraumatic  Lungs:  Normal effort and normal respiratory rate.  Breath sounds clear to auscultation: No rhonchi/Rales.  No  respiratory distress.   Heart:  S1, S2 normal.   Abdomen: Abdomen is soft, nontender/nondistended  Extremities: No edema of bilateral lower extremities  Skin:  Warm and dry       Results Review:    Intake/Output:     Intake/Output Summary (Last 24 hours) at 5/26/2025 1257  Last data filed at 5/25/2025 1700  Gross per 24 hour   Intake 360 ml   Output --   Net 360 ml         DATA:  Radiology and Labs:  The following " labs independently reviewed by me. Additional labs ordered for tomorrow a.m.  Interval notes, chart personally reviewed by me.   Old records independently reviewed showing CKD 3 baseline creatinine around 1.4.  Discussed with patient himself at bedside    Risk/ complexity of medical care/ medical decision making moderate complexity of CKD and electrolyte management      Labs:   Recent Results (from the past 24 hours)   POC Glucose Once    Collection Time: 05/25/25  4:20 PM    Specimen: Blood   Result Value Ref Range    Glucose 442 (C) 70 - 130 mg/dL   POC Glucose Once    Collection Time: 05/25/25 10:10 PM    Specimen: Blood   Result Value Ref Range    Glucose 171 (H) 70 - 130 mg/dL   Renal Function Panel    Collection Time: 05/26/25  6:11 AM    Specimen: Blood   Result Value Ref Range    Glucose 254 (H) 65 - 99 mg/dL    BUN 19 8 - 23 mg/dL    Creatinine 1.67 (H) 0.76 - 1.27 mg/dL    Sodium 137 136 - 145 mmol/L    Potassium 5.2 3.5 - 5.2 mmol/L    Chloride 103 98 - 107 mmol/L    CO2 25.3 22.0 - 29.0 mmol/L    Calcium 9.3 8.6 - 10.5 mg/dL    Albumin 3.7 3.5 - 5.2 g/dL    Phosphorus 3.5 2.5 - 4.5 mg/dL    Anion Gap 8.7 5.0 - 15.0 mmol/L    BUN/Creatinine Ratio 11.4 7.0 - 25.0    eGFR 45.1 (L) >60.0 mL/min/1.73   CBC Auto Differential    Collection Time: 05/26/25  6:11 AM    Specimen: Blood   Result Value Ref Range    WBC 6.34 3.40 - 10.80 10*3/mm3    RBC 3.29 (L) 4.14 - 5.80 10*6/mm3    Hemoglobin 10.4 (L) 13.0 - 17.7 g/dL    Hematocrit 32.8 (L) 37.5 - 51.0 %    MCV 99.7 (H) 79.0 - 97.0 fL    MCH 31.6 26.6 - 33.0 pg    MCHC 31.7 31.5 - 35.7 g/dL    RDW 13.6 12.3 - 15.4 %    RDW-SD 49.0 37.0 - 54.0 fl    MPV 11.2 6.0 - 12.0 fL    Platelets 200 140 - 450 10*3/mm3    Neutrophil % 57.8 42.7 - 76.0 %    Lymphocyte % 26.5 19.6 - 45.3 %    Monocyte % 9.9 5.0 - 12.0 %    Eosinophil % 4.4 0.3 - 6.2 %    Basophil % 0.9 0.0 - 1.5 %    Immature Grans % 0.5 0.0 - 0.5 %    Neutrophils, Absolute 3.66 1.70 - 7.00 10*3/mm3     Lymphocytes, Absolute 1.68 0.70 - 3.10 10*3/mm3    Monocytes, Absolute 0.63 0.10 - 0.90 10*3/mm3    Eosinophils, Absolute 0.28 0.00 - 0.40 10*3/mm3    Basophils, Absolute 0.06 0.00 - 0.20 10*3/mm3    Immature Grans, Absolute 0.03 0.00 - 0.05 10*3/mm3    nRBC 0.0 0.0 - 0.2 /100 WBC   POC Glucose Once    Collection Time: 05/26/25  6:16 AM    Specimen: Blood   Result Value Ref Range    Glucose 241 (H) 70 - 130 mg/dL   POC Glucose Once    Collection Time: 05/26/25 11:22 AM    Specimen: Blood   Result Value Ref Range    Glucose 204 (H) 70 - 130 mg/dL       Radiology:  Pertinent radiology studies were reviewed as described above      Medications have been reviewed separately in chart review medication tab      ASSESSMENT:  MARLON, prerenal with hypotension, stabilizing  CKD stage IIIa, baseline creatinine around 1.3  Hypotension, better with fluids  Diabetes mellitus type II, poorly controlled, A1c 13  Altered mental status, improved    MARLON (acute kidney injury)  Anemia of CKD  Mild hyponatremia  Hyperkalemia, improved with medical treatment        DISCUSSION/PLAN:   Creatinine fairly stable, suspect this may be close to his new baseline  BP at goal  Continue current regimen  Potassium improved with low K diet and chlorthalidone  Continue current BP regimen at discharge    Renal ultrasound was unremarkable  Urine studies with glucosuria but minimal proteinuria  His blood pressure is well-controlled today  Stable for discharge from renal standpoint at any time       Rick Stallworth MD  [facility]   Office phone number: [phone]  Answering service phone number: [phone]    05/26/25  12:57 EDT    Dictation performed using Dragon dictation software

## 2025-05-26 NOTE — PLAN OF CARE
Goal Outcome Evaluation:  Plan of Care Reviewed With: patient           Outcome Evaluation: Pt. seen for physical therapy treatment session. He is UIC and eager to ambulate. He performs STS with standby A, ambulates ~600 ft with RW and standby A. He is eager to d/c. PT will sign off as he has no further need for skilled PT in the acute setting. Emphasized safety protocol like non skid socks and use of RW when up and ambulating within room. PT rec home at [place] with A.    Anticipated Discharge Disposition (PT): home with assist

## 2025-05-26 NOTE — DISCHARGE SUMMARY
Discharge summary    Date of admission 5/21/2025  Date of discharge 5/26/2025    Final diagnosis  Acute kidney injury resolved  Insulin-dependent diabetes mellitus  Hypertension  Hyperlipidemia  Seizure disorder  History of CVA with  dysarthria  Depression  Chronic kidney disease stage III  Gastroesophageal reflux disease    Discharge medications    Current Facility-Administered Medications:     acetaminophen (TYLENOL) tablet 650 mg, 650 mg, Oral, Q6H PRN, Atilio Melendrez MD, 650 mg at 05/24/25 1834    allopurinol (ZYLOPRIM) tablet 100 mg, 100 mg, Oral, Daily, Atilio Melendrez MD, 100 mg at 05/26/25 0831    amLODIPine (NORVASC) tablet 10 mg, 10 mg, Oral, Daily, Atilio Melendrez MD, 10 mg at 05/26/25 0831    aspirin chewable tablet 81 mg, 81 mg, Oral, Daily, Atilio Melendrez MD, 81 mg at 05/26/25 0831    atorvastatin (LIPITOR) tablet 10 mg, 10 mg, Oral, Nightly, Atilio Melendrez MD, 10 mg at 05/25/25 2221    carvedilol (COREG) tablet 12.5 mg, 12.5 mg, Oral, BID With Meals, Atilio Melendrez MD, 12.5 mg at 05/26/25 0831    chlorthalidone (HYGROTON) tablet 25 mg, 25 mg, Oral, Daily, Rick Stallworth MD, 25 mg at 05/26/25 0832    clopidogrel (PLAVIX) tablet 75 mg, 75 mg, Oral, Daily, Atilio Melendrez MD, 75 mg at 05/26/25 0832    famotidine (PEPCID) tablet 20 mg, 20 mg, Oral, BID, Atilio Melendrez MD, 20 mg at 05/26/25 0832    gabapentin (NEURONTIN) capsule 800 mg, 800 mg, Oral, TID, Atilio Melendrez MD, 800 mg at 05/26/25 0831    HYDROcodone-acetaminophen (NORCO) 7.5-325 MG per tablet 1 tablet, 1 tablet, Oral, Q8H PRN, Atilio Melendrez MD    insulin glargine (LANTUS, SEMGLEE) injection 15 Units, 15 Units, Subcutaneous, Nightly, Atilio Melendrez MD, 15 Units at 05/25/25 2222    insulin lispro (HUMALOG/ADMELOG) injection 2-7 Units, 2-7 Units, Subcutaneous, 4x Daily AC & at Bedtime, Atilio Melendrez MD, 3 Units at 05/26/25 1356    insulin lispro (HUMALOG/ADMELOG) injection 5 Units, 5 Units, Subcutaneous, TID With Meals, Atilio Melendrez MD, 5 Units at  05/26/25 1356    isosorbide mononitrate (IMDUR) 24 hr tablet 30 mg, 30 mg, Oral, Q24H, Jaime Melendrez MD, 30 mg at 05/26/25 0831    levETIRAcetam (KEPPRA) tablet 500 mg, 500 mg, Oral, BID, Jaime Melendrez MD, 500 mg at 05/26/25 0831    montelukast (SINGULAIR) tablet 10 mg, 10 mg, Oral, Nightly, Jaime Melendrez MD, 10 mg at 05/25/25 2221    nitroglycerin (NITROSTAT) SL tablet 0.4 mg, 0.4 mg, Sublingual, Q5 Min PRN, Rick Stallworth MD    traZODone (DESYREL) tablet 100 mg, 100 mg, Oral, Nightly, Jaime Melendrez MD, 100 mg at 05/25/25 2221     Consults obtained  Nephrology  Nutrition    Procedures  None    Hospital course  65-year-old white male with history of insulin-dependent diabetes mellitus hypertension CVA coronary artery disease chronic kidney disease admitted to emergency room with altered mental status.  Patient workup in ER revealed acute kidney injury admitted for management.  Patient admitted treated with IV fluid and his diuretics Cozaar Glucophage Protonix were held and followed by nephrology.  Patient also have hyperkalemia which is corrected.  Patient kidney function back to baseline his diuretics restarted and nephrology recommend no ARB on discharge.  Patient is back to baseline and cleared for discharge.    Discharge diet regular    Activity as tolerated    Medication as above    Follow-up with primary doctor in 1 week and follow-up with nephrology per the instructions and take medication as directed.    JAIME MELENDREZ MD

## 2025-05-26 NOTE — PROGRESS NOTES
"Daily progress note    Primary care physician      Subjective  Doing better with no new complaint and wants to go home    History of present illness  65-year-old male with history of diabetes hypertension CVA coronary artery disease chronic kidney disease who was recently discharged from [facility] presented to [facility] emergency room with altered mental status.  Patient evaluated in ER found to have acute kidney injury admitted for management.  At the time of examination he is awake and alert and give me a good history but dysarthric which he said is baseline and denies any headache dizziness chest pain shortness of breath abdominal pain nausea vomiting diarrhea.     REVIEW OF SYSTEMS  Unremarkable      PHYSICAL EXAM   Blood pressure 119/59, pulse 62, temperature 98.4 °F (36.9 °C), temperature source Oral, resp. rate 16, height 165.1 cm (65\"), weight 88 kg (194 lb 0.1 oz), SpO2 98%.    Constitutional:       General: He is not in acute distress.     Appearance: Normal appearance. He is not ill-appearing, toxic-appearing or diaphoretic.   HENT:      Head: Normocephalic and atraumatic.      Nose: Nose normal.      Mouth/Throat:      Mouth: Mucous membranes are dry.   Eyes:      Extraocular Movements: Extraocular movements intact.      Conjunctiva/sclera: Conjunctivae normal.      Pupils: Pupils are equal, round, and reactive to light.   Cardiovascular:      Rate and Rhythm: Normal rate and regular rhythm.      Pulses: Normal pulses.      Heart sounds: Normal heart sounds. No murmur heard.     No friction rub. No gallop.   Pulmonary:      Effort: Pulmonary effort is normal. No respiratory distress.      Breath sounds: Normal breath sounds. No stridor. No wheezing, rhonchi or rales.   Abdominal:      General: Abdomen is flat. There is no distension.      Palpations: Abdomen is soft.      Tenderness: There is abdominal tenderness  There is no guarding or rebound.   Musculoskeletal:      Cervical " back: Normal range of motion and neck supple.   Skin:     General: Skin is warm and dry.      Capillary Refill: Capillary refill takes less than 2 seconds.   Neurological:      General: No focal deficit present.      Mental Status: He is oriented to person, place, and time. Mental status is at baseline.   Psychiatric:         Mood and Affect: Mood normal.         Behavior: Behavior normal.         Thought Content: Thought content normal.         Judgment: Judgment normal.      LAB RESULTS  Lab Results (last 24 hours)       Procedure Component Value Units Date/Time    POC Glucose Once [[id number]]  (Abnormal) Collected: 05/26/25 1122    Specimen: Blood Updated: 05/26/25 1124     Glucose 204 mg/dL     Renal Function Panel [[id number]]  (Abnormal) Collected: 05/26/25 0611    Specimen: Blood Updated: 05/26/25 0703     Glucose 254 mg/dL      BUN 19 mg/dL      Creatinine 1.67 mg/dL      Sodium 137 mmol/L      Potassium 5.2 mmol/L      Chloride 103 mmol/L      CO2 25.3 mmol/L      Calcium 9.3 mg/dL      Albumin 3.7 g/dL      Phosphorus 3.5 mg/dL      Anion Gap 8.7 mmol/L      BUN/Creatinine Ratio 11.4     eGFR 45.1 mL/min/1.73     Narrative:      GFR Categories in Chronic Kidney Disease (CKD)              GFR Category          GFR (mL/min/1.73)    Interpretation  G1                    90 or greater        Normal or high (1)  G2                    60-89                Mild decrease (1)  G3a                   45-59                Mild to moderate decrease  G3b                   30-44                Moderate to severe decrease  G4                    15-29                Severe decrease  G5                    14 or less           Kidney failure    (1)In the absence of evidence of kidney disease, neither GFR category G1 or G2 fulfill the criteria for CKD.    eGFR calculation 2021 CKD-EPI creatinine equation, which does not include race as a factor    CBC & Differential [[id number]]  (Abnormal) Collected: 05/26/25 0611    Specimen:  Blood Updated: 05/26/25 0641    Narrative:      The following orders were created for panel order CBC & Differential.  Procedure                               Abnormality         Status                     ---------                               -----------         ------                     CBC Auto Differential[[id number]]        Abnormal            Final result                 Please view results for these tests on the individual orders.    CBC Auto Differential [[id number]]  (Abnormal) Collected: 05/26/25 0611    Specimen: Blood Updated: 05/26/25 0641     WBC 6.34 10*3/mm3      RBC 3.29 10*6/mm3      Hemoglobin 10.4 g/dL      Hematocrit 32.8 %      MCV 99.7 fL      MCH 31.6 pg      MCHC 31.7 g/dL      RDW 13.6 %      RDW-SD 49.0 fl      MPV 11.2 fL      Platelets 200 10*3/mm3      Neutrophil % 57.8 %      Lymphocyte % 26.5 %      Monocyte % 9.9 %      Eosinophil % 4.4 %      Basophil % 0.9 %      Immature Grans % 0.5 %      Neutrophils, Absolute 3.66 10*3/mm3      Lymphocytes, Absolute 1.68 10*3/mm3      Monocytes, Absolute 0.63 10*3/mm3      Eosinophils, Absolute 0.28 10*3/mm3      Basophils, Absolute 0.06 10*3/mm3      Immature Grans, Absolute 0.03 10*3/mm3      nRBC 0.0 /100 WBC     POC Glucose Once [[id number]]  (Abnormal) Collected: 05/26/25 0616    Specimen: Blood Updated: 05/26/25 0617     Glucose 241 mg/dL     POC Glucose Once [[id number]]  (Abnormal) Collected: 05/25/25 2210    Specimen: Blood Updated: 05/25/25 2211     Glucose 171 mg/dL     POC Glucose Once [[id number]]  (Abnormal) Collected: 05/25/25 1620    Specimen: Blood Updated: 05/25/25 1622     Glucose 442 mg/dL           Imaging Results (Last 24 Hours)       ** No results found for the last 24 hours. **          Scan on 5/21/2025 1515 by [facility], [place]: ECG 12-LEAD         Author: -- Service: -- Author Type: --   Filed: Date of Service: Creation Time:   Status: (Other)   HEART RATE=71  bpm  RR Hrofwxua=609  ms  NM Iuulpxko=671  ms  P Horizontal  Axis=4  deg  P Front Axis=25  deg  QRSD Interval=93  ms  QT Bwqqfedd=250  ms  QGhC=779  ms  QRS Axis=-21  deg  T Wave Axis=33  deg  - OTHERWISE NORMAL ECG -  Sinus rhythm  Borderline  left axis deviation  Abnormal R-wave progression, early transition  No change from prior tracing          Current Facility-Administered Medications:     acetaminophen (TYLENOL) tablet 650 mg, 650 mg, Oral, Q6H PRN, Atilio Melendrez MD, 650 mg at 05/24/25 1834    allopurinol (ZYLOPRIM) tablet 100 mg, 100 mg, Oral, Daily, Atilio Melendrez MD, 100 mg at 05/26/25 0831    amLODIPine (NORVASC) tablet 10 mg, 10 mg, Oral, Daily, Atilio Melendrez MD, 10 mg at 05/26/25 0831    aspirin chewable tablet 81 mg, 81 mg, Oral, Daily, Atilio Melendrez MD, 81 mg at 05/26/25 0831    atorvastatin (LIPITOR) tablet 10 mg, 10 mg, Oral, Nightly, Atilio Melendrez MD, 10 mg at 05/25/25 2221    carvedilol (COREG) tablet 12.5 mg, 12.5 mg, Oral, BID With Meals, Atilio Melendrez MD, 12.5 mg at 05/26/25 0831    chlorthalidone (HYGROTON) tablet 25 mg, 25 mg, Oral, Daily, Rick Stallworth MD, 25 mg at 05/26/25 0832    clopidogrel (PLAVIX) tablet 75 mg, 75 mg, Oral, Daily, Atilio Melendrez MD, 75 mg at 05/26/25 0832    famotidine (PEPCID) tablet 20 mg, 20 mg, Oral, BID, Atilio Melendrez MD, 20 mg at 05/26/25 0832    gabapentin (NEURONTIN) capsule 800 mg, 800 mg, Oral, TID, Atilio Melendrez MD, 800 mg at 05/26/25 0831    HYDROcodone-acetaminophen (NORCO) 7.5-325 MG per tablet 1 tablet, 1 tablet, Oral, Q8H PRN, Atilio Melendrez MD    insulin glargine (LANTUS, SEMGLEE) injection 15 Units, 15 Units, Subcutaneous, Nightly, Atilio Melendrez MD, 15 Units at 05/25/25 2222    insulin lispro (HUMALOG/ADMELOG) injection 2-7 Units, 2-7 Units, Subcutaneous, 4x Daily AC & at Bedtime, Atilio Melendrez MD, 3 Units at 05/26/25 1356    insulin lispro (HUMALOG/ADMELOG) injection 5 Units, 5 Units, Subcutaneous, TID With Meals, Atilio Melendrez MD, 5 Units at 05/26/25 1356    isosorbide mononitrate (IMDUR) 24 hr tablet 30  mg, 30 mg, Oral, Q24H, Jaime Melendrez MD, 30 mg at 05/26/25 0831    levETIRAcetam (KEPPRA) tablet 500 mg, 500 mg, Oral, BID, Jaime Melendrez MD, 500 mg at 05/26/25 0831    montelukast (SINGULAIR) tablet 10 mg, 10 mg, Oral, Nightly, Jaime Melendrez MD, 10 mg at 05/25/25 2221    nitroglycerin (NITROSTAT) SL tablet 0.4 mg, 0.4 mg, Sublingual, Q5 Min PRN, Rick Stallworth MD    traZODone (DESYREL) tablet 100 mg, 100 mg, Oral, Nightly, Jaime Melendrez MD, 100 mg at 05/25/25 2221     ASSESSMENT  Hyperkalemia corrected  Acute kidney injury resolved  Insulin-dependent diabetes mellitus  Hypertension  Hyperlipidemia  Seizure disorder  History of CVA with  dysarthria  Depression  Chronic kidney disease stage III  Gastroesophageal reflux disease    PLAN  Discharge home  Discharge summary dictated    JAIME MELENDREZ MD    Copied text in this note has been reviewed and is accurate as of 05/26/25

## 2025-05-26 NOTE — OUTREACH NOTE
Prep Survey      Flowsheet Row Responses   Holiness facility patient discharged from? [place]   Is LACE score < 7 ? No   Eligibility Readm Mgmt   Discharge diagnosis MARLON (acute kidney injury)   Does the patient have one of the following disease processes/diagnoses(primary or secondary)? Other   Does the patient have Home health ordered? Yes   What is the Home health agency?  [facility]   Is there a DME ordered? No   Prep survey completed? Yes            Blanca MCNAIR - Registered Nurse

## 2025-05-26 NOTE — THERAPY TREATMENT NOTE
Patient Name: Eric Simms  : 1960    MRN: [id number]                              Today's Date: 2025       Admit Date: 2025    Visit Dx:     ICD-10-CM ICD-9-CM   1. Acute kidney injury superimposed on chronic kidney disease  N17.9 584.9    N18.9 585.9   2. Altered mental status, unspecified altered mental status type  R41.82 780.97   3. Hypotension, unspecified hypotension type  I95.9 458.9   4. Acute kidney injury  N17.9 584.9     Patient Active Problem List   Diagnosis    Chest pain, unspecified type    Type 2 diabetes mellitus with hyperglycemia    HTN (hypertension)    Obesity (BMI 30-39.9)    Anemia, chronic disease    CKD (chronic kidney disease) stage 2, GFR 60-89 ml/min    Seizure disorder    MARLON (acute kidney injury)    Acute kidney injury     Past Medical History:   Diagnosis Date    Coronary artery disease     Diabetes mellitus     GERD (gastroesophageal reflux disease)     Hyperlipidemia     Hypertension     Peripheral neuropathy     Seizures     Stroke      History reviewed. No pertinent surgical history.   General Information       Row Name 25 [facility]          Physical Therapy Time and Intention    Document Type therapy note (daily note)  -ER     Mode of Treatment physical therapy;individual therapy  -ER       Row Name 25 143          General Information    Patient Profile Reviewed yes  -ER     Existing Precautions/Restrictions fall  -ER       Row Name 25 143          Cognition    Orientation Status (Cognition) oriented x 3  -ER       Row Name 25 143          Safety Issues/Impairments Affecting Functional Mobility    Comment, Safety Issues/Impairments (Mobility) Gait belt and non skid socks  -ER               User Key  (r) = Recorded By, (t) = Taken By, (c) = Cosigned By      Initials Name Provider Type    ER Kaylee Fountain PT Physical Therapist                   Mobility       Row Name 25 1431          Bed Mobility    Comment, (Bed Mobility) UIC at  arrival  -ER       Row Name 05/26/25 1431          Sit-Stand Transfer    Sit-Stand [place] (Transfers) standby assist  -ER     Assistive Device (Sit-Stand Transfers) walker, front-wheeled  -ER       Row Name 05/26/25 1431          Gait/Stairs (Locomotion)    [place] Level (Gait) standby assist  -ER     Assistive Device (Gait) walker, front-wheeled  -ER     Patient was able to Ambulate yes  -ER     Distance in Feet (Gait) 600  -ER     Bilateral Gait Deviations forward flexed posture  -ER     Comment, (Gait/Stairs) forward flexed posture, but able to stand, ambulate several laps around nursing station with no overt LOB  -ER               User Key  (r) = Recorded By, (t) = Taken By, (c) = Cosigned By      Initials Name Provider Type    ER Kaylee Fountain, [place] Physical Therapist                   Obj/Interventions       Row Name 05/26/25 1434          Balance    Balance Assessment sitting static balance;sitting dynamic balance;standing static balance;standing dynamic balance  -ER     Static Sitting Balance modified independence  -ER     Dynamic Sitting Balance modified independence  -ER     Position, Sitting Balance sitting in chair  -ER     Static Standing Balance standby assist  -ER     Dynamic Standing Balance standby assist  -ER     Position/Device Used, Standing Balance supported;walker, front-wheeled  -ER     Balance Interventions sitting;standing;sit to stand;supported;dynamic;static  -ER               User Key  (r) = Recorded By, (t) = Taken By, (c) = Cosigned By      Initials Name Provider Type    ER Kaylee Fountain, PT Physical Therapist                   Goals/Plan    No documentation.                  Clinical Impression       Row Name 05/26/25 1436          Pain    Pretreatment Pain Rating 0/10 - no pain  -ER     Posttreatment Pain Rating 0/10 - no pain  -ER       Row Name 05/26/25 1436          Plan of Care Review    Plan of Care Reviewed With patient  -ER     Outcome Evaluation Pt. seen for physical  therapy treatment session. He is UIC and eager to ambulate. He performs STS with standby A, ambulates ~600 ft with RW and standby A. He is eager to d/c. PT will sign off as he has no further need for skilled PT in the acute setting. Emphasized safety protocol like non skid socks and use of RW when up and ambulating within room. PT rec home at AR with A.  -ER       Row Name 05/26/25 1436          Therapy Assessment/Plan (PT)    Criteria for Skilled Interventions Met (PT) no;no problems identified which require skilled intervention;does not meet criteria for skilled intervention  -ER       Row Name 05/26/25 1436          Positioning and Restraints    Pre-Treatment Position sitting in chair/recliner  -ER     Post Treatment Position chair  -ER     In Bed notified nsg;call light within reach;exit alarm on;encouraged to call for assist  -ER               User Key  (r) = Recorded By, (t) = Taken By, (c) = Cosigned By      Initials Name Provider Type    ER Kaylee Fountain, PT Physical Therapist                   Outcome Measures       Row Name 05/26/25 1438 05/26/25 1030       How much help from another person do you currently need...    Turning from your back to your side while in flat bed without using bedrails? 4  -ER 4  -TC    Moving from lying on back to sitting on the side of a flat bed without bedrails? 4  -ER 4  -TC    Moving to and from a bed to a chair (including a wheelchair)? 4  -ER 3  -TC    Standing up from a chair using your arms (e.g., wheelchair, bedside chair)? 4  -ER 3  -TC    Climbing 3-5 steps with a railing? 3  -ER 3  -TC    To walk in hospital room? 4  -ER 3  -TC    AM-PAC 6 Clicks Score (PT) 23  -ER 20  -TC    Highest Level of Mobility Goal Walk 25 Feet or More-7  -ER Walk 10 Steps or More-6  -TC      Row Name 05/26/25 1438          Functional Assessment    Outcome Measure Options AM-PAC 6 Clicks Basic Mobility (PT)  -ER               User Key  (r) = Recorded By, (t) = Taken By, (c) = Cosigned By       Initials Name Provider Type    TC Drea Gilliam, RN Registered Nurse    ER Kaylee Fountain, PT Physical Therapist                                 Physical Therapy Education       Title: PT OT SLP Therapies (Done)       Topic: Physical Therapy (Done)       Point: Mobility training (Done)       Learning Progress Summary            Patient Acceptance, E, VU by ER at 5/26/2025 1438    Acceptance, E,D, VU,NR by MS at 5/23/2025 1546    Acceptance, E,D, VU,NR by MS at 5/22/2025 1128                      Point: Home exercise program (Done)       Learning Progress Summary            Patient Acceptance, E, VU by ER at 5/26/2025 1438    Acceptance, E,D, VU,NR by MS at 5/22/2025 1128                      Point: Body mechanics (Done)       Learning Progress Summary            Patient Acceptance, E, VU by ER at 5/26/2025 1438    Acceptance, E,D, VU,NR by MS at 5/23/2025 1546    Acceptance, E,D, VU,NR by MS at 5/22/2025 1128                      Point: Precautions (Done)       Learning Progress Summary            Patient Acceptance, E, VU by ER at 5/26/2025 1438    Acceptance, E,D, VU,NR by MS at 5/23/2025 1546    Acceptance, E,D, VU,NR by MS at 5/22/2025 1128                                      User Key       Initials Effective Dates Name Provider Type Discipline    MS 06/16/21 -  Sergio Tilley PT Physical Therapist PT    ER 10/15/23 -  Kaylee Fountain PT Physical Therapist PT                  PT Recommendation and Plan     Outcome Evaluation: Pt. seen for physical therapy treatment session. He is UIC and eager to ambulate. He performs STS with standby A, ambulates ~600 ft with RW and standby A. He is eager to d/c. PT will sign off as he has no further need for skilled PT in the acute setting. Emphasized safety protocol like non skid socks and use of RW when up and ambulating within room. PT rec home at dc with A.     Time Calculation:         PT Charges       Row Name 05/26/25 1441             Time Calculation    Start Time 1341   -ER      Stop Time 1353  -ER      Time Calculation (min) 12 min  -ER      PT Received On 05/26/25  -ER         Time Calculation- PT    Total Timed Code Minutes- PT 12 minute(s)  -ER         Timed Charges    77606 - PT Therapeutic Activity Minutes 12  -ER         Total Minutes    Timed Charges Total Minutes 12  -ER       Total Minutes 12  -ER                User Key  (r) = Recorded By, (t) = Taken By, (c) = Cosigned By      Initials Name Provider Type    ER Kaylee Fountain, PT Physical Therapist                  Therapy Charges for Today       Code Description Service Date Service Provider Modifiers Qty    52194655201 HC PT THERAPEUTIC ACT EA 15 MIN 5/26/2025 Kaylee Fountain, PT GP 1            PT G-Codes  Outcome Measure Options: AM-PAC 6 Clicks Basic Mobility (PT)  AM-PAC 6 Clicks Score (PT): 23  AM-PAC 6 Clicks Score (OT): 24  Modified [place] Scale: 0 - No Symptoms at all.  PT Discharge Summary  Anticipated Discharge Disposition (PT): home with assist    Kaylee Fountain PT  5/26/2025

## 2025-05-26 NOTE — CASE MANAGEMENT/SOCIAL WORK
Continued Stay Note  [facility]     Patient Name: Eric Simms  MRN: [id number]  Today's Date: 5/26/2025    Admit Date: 5/21/2025    Plan: Home with [facility] to resume care   Discharge Plan       Row Name 05/26/25 1417       Plan    Plan Home with [facility] to resume care    Plan Comments Patient is current with [facility] and they have accepted to resume care.  Justine with [facility] notified of D/C today.  Home Health resumption orders placed for hospitalist for home SN and PT.                   Discharge Codes    No documentation.                 Expected Discharge Date and Time       Expected Discharge Date Expected Discharge Time    May 26, 2025               Gabbie Lyle RN

## 2025-05-27 NOTE — CASE MANAGEMENT/SOCIAL WORK
Case Management Discharge Note      Final Note: home with [facility]  via [facility]         Selected Continued Care - Discharged on 5/26/2025 Admission date: 5/21/2025 - Discharge disposition: Home or Self Care      Destination    No services have been selected for the patient.                Durable Medical Equipment    No services have been selected for the patient.                Dialysis/Infusion    No services have been selected for the patient.                Home Medical Care       Service Provider Services Address Phone Fax Patient Preferred    [facility] [address] [place] [address], [address] [address] [phone] --              Therapy    No services have been selected for the patient.                Community Resources    No services have been selected for the patient.                Community & DME    No services have been selected for the patient.                    Transportation Services  Taxi: Tom    Final Discharge Disposition Code: 06 - home with home health care

## 2025-05-29 ENCOUNTER — APPOINTMENT (OUTPATIENT)
Dept: CT IMAGING | Facility: HOSPITAL | Age: 65
End: 2025-05-29
Payer: MEDICARE

## 2025-05-29 ENCOUNTER — HOSPITAL ENCOUNTER (INPATIENT)
Facility: HOSPITAL | Age: 65
LOS: 2 days | Discharge: HOME | End: 2025-06-02
Attending: EMERGENCY MEDICINE | Admitting: HOSPITALIST
Payer: MEDICARE

## 2025-05-29 ENCOUNTER — READMISSION MANAGEMENT (OUTPATIENT)
Dept: CALL CENTER | Facility: HOSPITAL | Age: 65
End: 2025-05-29
Payer: MEDICARE

## 2025-05-29 DIAGNOSIS — E11.65 TYPE 2 DIABETES MELLITUS WITH HYPERGLYCEMIA, WITH LONG-TERM CURRENT USE OF INSULIN: Primary | ICD-10-CM

## 2025-05-29 DIAGNOSIS — R55 SYNCOPE, UNSPECIFIED SYNCOPE TYPE: ICD-10-CM

## 2025-05-29 DIAGNOSIS — N17.9 ACUTE RENAL FAILURE, UNSPECIFIED ACUTE RENAL FAILURE TYPE: ICD-10-CM

## 2025-05-29 DIAGNOSIS — Z79.4 TYPE 2 DIABETES MELLITUS WITH HYPERGLYCEMIA, WITH LONG-TERM CURRENT USE OF INSULIN: Primary | ICD-10-CM

## 2025-05-29 LAB
ALBUMIN SERPL-MCNC: 3.8 G/DL (ref 3.5–5.2)
ALBUMIN/GLOB SERPL: 1.4 G/DL
ALP SERPL-CCNC: 51 U/L (ref 39–117)
ALT SERPL W P-5'-P-CCNC: 26 U/L (ref 1–41)
ANION GAP SERPL CALCULATED.3IONS-SCNC: 13 MMOL/L (ref 5–15)
AST SERPL-CCNC: 22 U/L (ref 1–40)
BASOPHILS # BLD AUTO: 0.06 10*3/MM3 (ref 0–0.2)
BASOPHILS NFR BLD AUTO: 0.6 % (ref 0–1.5)
BILIRUB SERPL-MCNC: 0.3 MG/DL (ref 0–1.2)
BUN SERPL-MCNC: 26 MG/DL (ref 8–23)
BUN/CREAT SERPL: 10.2 (ref 7–25)
CALCIUM SPEC-SCNC: 8.9 MG/DL (ref 8.6–10.5)
CHLORIDE SERPL-SCNC: 103 MMOL/L (ref 98–107)
CO2 SERPL-SCNC: 17 MMOL/L (ref 22–29)
CREAT SERPL-MCNC: 2.54 MG/DL (ref 0.76–1.27)
DEPRECATED RDW RBC AUTO: 47.5 FL (ref 37–54)
EGFRCR SERPLBLD CKD-EPI 2021: 27.3 ML/MIN/1.73
EOSINOPHIL # BLD AUTO: 0.28 10*3/MM3 (ref 0–0.4)
EOSINOPHIL NFR BLD AUTO: 2.6 % (ref 0.3–6.2)
ERYTHROCYTE [DISTWIDTH] IN BLOOD BY AUTOMATED COUNT: 13.3 % (ref 12.3–15.4)
GEN 5 1HR TROPONIN T REFLEX: 35 NG/L
GLOBULIN UR ELPH-MCNC: 2.8 GM/DL
GLUCOSE SERPL-MCNC: 71 MG/DL (ref 65–99)
HCT VFR BLD AUTO: 31.2 % (ref 37.5–51)
HGB BLD-MCNC: 10.4 G/DL (ref 13–17.7)
IMM GRANULOCYTES # BLD AUTO: 0.11 10*3/MM3 (ref 0–0.05)
IMM GRANULOCYTES NFR BLD AUTO: 1 % (ref 0–0.5)
INR PPP: 1.08 (ref 0.9–1.1)
LYMPHOCYTES # BLD AUTO: 1.58 10*3/MM3 (ref 0.7–3.1)
LYMPHOCYTES NFR BLD AUTO: 14.9 % (ref 19.6–45.3)
MCH RBC QN AUTO: 32.4 PG (ref 26.6–33)
MCHC RBC AUTO-ENTMCNC: 33.3 G/DL (ref 31.5–35.7)
MCV RBC AUTO: 97.2 FL (ref 79–97)
MONOCYTES # BLD AUTO: 0.99 10*3/MM3 (ref 0.1–0.9)
MONOCYTES NFR BLD AUTO: 9.3 % (ref 5–12)
NEUTROPHILS NFR BLD AUTO: 7.61 10*3/MM3 (ref 1.7–7)
NEUTROPHILS NFR BLD AUTO: 71.6 % (ref 42.7–76)
NRBC BLD AUTO-RTO: 0 /100 WBC (ref 0–0.2)
PLATELET # BLD AUTO: 207 10*3/MM3 (ref 140–450)
PMV BLD AUTO: 11.1 FL (ref 6–12)
POTASSIUM SERPL-SCNC: 4.9 MMOL/L (ref 3.5–5.2)
PROT SERPL-MCNC: 6.6 G/DL (ref 6–8.5)
PROTHROMBIN TIME: 13.9 SECONDS (ref 11.7–14.2)
RBC # BLD AUTO: 3.21 10*6/MM3 (ref 4.14–5.8)
SODIUM SERPL-SCNC: 133 MMOL/L (ref 136–145)
TROPONIN T % DELTA: -3
TROPONIN T NUMERIC DELTA: -1 NG/L
TROPONIN T SERPL HS-MCNC: 36 NG/L
WBC NRBC COR # BLD AUTO: 10.63 10*3/MM3 (ref 3.4–10.8)

## 2025-05-29 PROCEDURE — 85610 PROTHROMBIN TIME: CPT | Performed by: EMERGENCY MEDICINE

## 2025-05-29 PROCEDURE — 99285 EMERGENCY DEPT VISIT HI MDM: CPT

## 2025-05-29 PROCEDURE — 85025 COMPLETE CBC W/AUTO DIFF WBC: CPT | Performed by: EMERGENCY MEDICINE

## 2025-05-29 PROCEDURE — 93010 ELECTROCARDIOGRAM REPORT: CPT | Performed by: INTERNAL MEDICINE

## 2025-05-29 PROCEDURE — G0378 HOSPITAL OBSERVATION PER HR: HCPCS

## 2025-05-29 PROCEDURE — 25810000003 SODIUM CHLORIDE 0.9 % SOLUTION: Performed by: HOSPITALIST

## 2025-05-29 PROCEDURE — 36415 COLL VENOUS BLD VENIPUNCTURE: CPT | Performed by: EMERGENCY MEDICINE

## 2025-05-29 PROCEDURE — 25810000003 SODIUM CHLORIDE 0.9 % SOLUTION: Performed by: EMERGENCY MEDICINE

## 2025-05-29 PROCEDURE — 93005 ELECTROCARDIOGRAM TRACING: CPT | Performed by: EMERGENCY MEDICINE

## 2025-05-29 PROCEDURE — 84484 ASSAY OF TROPONIN QUANT: CPT | Performed by: EMERGENCY MEDICINE

## 2025-05-29 PROCEDURE — 80053 COMPREHEN METABOLIC PANEL: CPT | Performed by: EMERGENCY MEDICINE

## 2025-05-29 PROCEDURE — 70450 CT HEAD/BRAIN W/O DYE: CPT

## 2025-05-29 RX ORDER — FAMOTIDINE 20 MG/1
20 TABLET, FILM COATED ORAL 2 TIMES DAILY
Status: DISCONTINUED | OUTPATIENT
Start: 2025-05-29 | End: 2025-06-02 | Stop reason: HOSPADM

## 2025-05-29 RX ORDER — AMLODIPINE BESYLATE 10 MG/1
10 TABLET ORAL DAILY
Status: DISCONTINUED | OUTPATIENT
Start: 2025-05-29 | End: 2025-05-30

## 2025-05-29 RX ORDER — LEVETIRACETAM 500 MG/1
500 TABLET ORAL 2 TIMES DAILY
Status: DISCONTINUED | OUTPATIENT
Start: 2025-05-29 | End: 2025-05-31

## 2025-05-29 RX ORDER — CLOPIDOGREL BISULFATE 75 MG/1
75 TABLET ORAL DAILY
Status: DISCONTINUED | OUTPATIENT
Start: 2025-05-29 | End: 2025-06-02 | Stop reason: HOSPADM

## 2025-05-29 RX ORDER — ATORVASTATIN CALCIUM 10 MG/1
10 TABLET, FILM COATED ORAL NIGHTLY
Status: DISCONTINUED | OUTPATIENT
Start: 2025-05-29 | End: 2025-06-02 | Stop reason: HOSPADM

## 2025-05-29 RX ORDER — SODIUM CHLORIDE 9 MG/ML
75 INJECTION, SOLUTION INTRAVENOUS CONTINUOUS
Status: DISCONTINUED | OUTPATIENT
Start: 2025-05-29 | End: 2025-05-31

## 2025-05-29 RX ORDER — ASPIRIN 81 MG/1
81 TABLET, CHEWABLE ORAL DAILY
Status: DISCONTINUED | OUTPATIENT
Start: 2025-05-29 | End: 2025-06-02 | Stop reason: HOSPADM

## 2025-05-29 RX ADMIN — ATORVASTATIN CALCIUM 10 MG: 10 TABLET ORAL at 20:23

## 2025-05-29 RX ADMIN — SODIUM CHLORIDE 75 ML/HR: 9 INJECTION, SOLUTION INTRAVENOUS at 20:23

## 2025-05-29 RX ADMIN — SODIUM CHLORIDE 500 ML: 9 INJECTION, SOLUTION INTRAVENOUS at 15:29

## 2025-05-29 RX ADMIN — LEVETIRACETAM 500 MG: 500 TABLET, FILM COATED ORAL at 20:23

## 2025-05-29 RX ADMIN — FAMOTIDINE 20 MG: 20 TABLET, FILM COATED ORAL at 20:25

## 2025-05-29 NOTE — ED PROVIDER NOTES
EMERGENCY DEPARTMENT ENCOUNTER  Room Number:  37/37  PCP: Erlin Madrigal MD  Independent Historians: Patient, EMS who brought patient      HPI:  Chief Complaint: had concerns including Syncope and Weakness - Generalized.     A complete HPI/ROS/PMH/PSH/SH/FH are unobtainable due to:   Chronic or social conditions impacting patient care (Social Determinants of Health):       Context: Eric Simms is a 65 y.o. male with a medical history of diabetes, hypertension and hyperlipidemia who presents to the ED c/o acute syncopal episode.  Patient states he has had some nausea vomit diarrhea over the last several days.  Today he felt lightheaded while on the commode.  He passed out once and then passed out again.  He did hit his right arm.  He is unsure if he hit his head.  Patient currently feels better.  He denies any chest pain or trouble breathing.  Denies headache or neurologic symptoms.      Review of prior external notes (non-ED) -and- Review of prior external test results outside of this encounter:   I reviewed prior medical records note patient was hospitalized several days ago with acute renal failure.  He was treated with IV fluids and diuretics were held.  Patient discharged home after IV fluids and renal consultation from Dr. Rick Wells.      Prescription drug monitoring program review:         PAST MEDICAL HISTORY  Active Ambulatory Problems     Diagnosis Date Noted    Chest pain, unspecified type 02/22/2023    Type 2 diabetes mellitus with hyperglycemia 02/22/2023    HTN (hypertension) 02/22/2023    Obesity (BMI 30-39.9) 02/22/2023    Anemia, chronic disease 02/22/2023    CKD (chronic kidney disease) stage 2, GFR 60-89 ml/min 02/22/2023    Seizure disorder 02/22/2023    MARLON (acute kidney injury) 05/21/2025    Acute kidney injury 05/22/2025     Resolved Ambulatory Problems     Diagnosis Date Noted    No Resolved Ambulatory Problems     Past Medical History:   Diagnosis Date    Coronary artery disease      Diabetes mellitus     GERD (gastroesophageal reflux disease)     Hyperlipidemia     Hypertension     Peripheral neuropathy     Seizures     Stroke          PAST SURGICAL HISTORY  No past surgical history on file.      FAMILY HISTORY  No family history on file.      SOCIAL HISTORY  Social History     Socioeconomic History    Marital status:    Tobacco Use    Smoking status: Never    Smokeless tobacco: Never   Vaping Use    Vaping status: Never Used   Substance and Sexual Activity    Alcohol use: Not Currently    Drug use: Not Currently     Comment: crack - last use 15 years ago    Sexual activity: Defer         ALLERGIES  Patient has no known allergies.      REVIEW OF SYSTEMS  Review of Systems   Constitutional:  Negative for fever.   Respiratory:  Negative for shortness of breath.    Cardiovascular:  Negative for chest pain.   Gastrointestinal:  Positive for diarrhea, nausea and vomiting.   Neurological:  Positive for syncope.   All other systems reviewed and are negative.    Included in HPI  All systems reviewed and negative except for those discussed in HPI.      PHYSICAL EXAM    I have reviewed the triage vital signs and nursing notes.    ED Triage Vitals   Temp Heart Rate Resp BP SpO2   05/29/25 1412 05/29/25 1412 05/29/25 1412 05/29/25 1415 05/29/25 1412   98.2 °F (36.8 °C) 66 18 112/71 100 %      Temp src Heart Rate Source Patient Position BP Location FiO2 (%)   05/29/25 1412 05/29/25 1412 05/29/25 1415 05/29/25 1415 --   Oral Monitor Lying Right arm        Physical Exam  GENERAL: Alert well-appearing male in no obvious distress.  Triage vitals reviewed and are notable for blood pressure 112/71, pulse 66.  Temperature and O2 sats are normal  SKIN: Warm, dry.  Examination of the left proximal forearm reveals a superficial abrasion.  There is no significant laceration requiring repair.    HENT: Normocephalic, atraumatic  EYES: no scleral icterus  CV: regular rhythm, regular rate-no murmur  RESPIRATORY:  normal effort, lungs clear-O2 sats upper 90s room air  ABDOMEN: soft, nontender, nondistended  MUSCULOSKELETAL: Spine-no significant bony tenderness to palpation, good range of motion.  Upper extremities-minor skin tear to the proximal forearm.  No significant bony tenderness to palpation, good range of motion  Lower extremities-atraumatic, good range of motion  NEURO: alert, moves all extremities, follows commands      LAB RESULTS  Recent Results (from the past 24 hours)   ECG 12 Lead Syncope    Collection Time: 05/29/25  2:27 PM   Result Value Ref Range    QT Interval 410 ms    QTC Interval 433 ms   Comprehensive Metabolic Panel    Collection Time: 05/29/25  3:17 PM    Specimen: Blood   Result Value Ref Range    Glucose 71 65 - 99 mg/dL    BUN 26.0 (H) 8.0 - 23.0 mg/dL    Creatinine 2.54 (H) 0.76 - 1.27 mg/dL    Sodium 133 (L) 136 - 145 mmol/L    Potassium 4.9 3.5 - 5.2 mmol/L    Chloride 103 98 - 107 mmol/L    CO2 17.0 (L) 22.0 - 29.0 mmol/L    Calcium 8.9 8.6 - 10.5 mg/dL    Total Protein 6.6 6.0 - 8.5 g/dL    Albumin 3.8 3.5 - 5.2 g/dL    ALT (SGPT) 26 1 - 41 U/L    AST (SGOT) 22 1 - 40 U/L    Alkaline Phosphatase 51 39 - 117 U/L    Total Bilirubin 0.3 0.0 - 1.2 mg/dL    Globulin 2.8 gm/dL    A/G Ratio 1.4 g/dL    BUN/Creatinine Ratio 10.2 7.0 - 25.0    Anion Gap 13.0 5.0 - 15.0 mmol/L    eGFR 27.3 (L) >60.0 mL/min/1.73   Protime-INR    Collection Time: 05/29/25  3:17 PM    Specimen: Blood   Result Value Ref Range    Protime 13.9 11.7 - 14.2 Seconds    INR 1.08 0.90 - 1.10   High Sensitivity Troponin T    Collection Time: 05/29/25  3:17 PM    Specimen: Blood   Result Value Ref Range    HS Troponin T 36 (H) <22 ng/L   CBC Auto Differential    Collection Time: 05/29/25  3:17 PM    Specimen: Blood   Result Value Ref Range    WBC 10.63 3.40 - 10.80 10*3/mm3    RBC 3.21 (L) 4.14 - 5.80 10*6/mm3    Hemoglobin 10.4 (L) 13.0 - 17.7 g/dL    Hematocrit 31.2 (L) 37.5 - 51.0 %    MCV 97.2 (H) 79.0 - 97.0 fL    MCH 32.4  26.6 - 33.0 pg    MCHC 33.3 31.5 - 35.7 g/dL    RDW 13.3 12.3 - 15.4 %    RDW-SD 47.5 37.0 - 54.0 fl    MPV 11.1 6.0 - 12.0 fL    Platelets 207 140 - 450 10*3/mm3    Neutrophil % 71.6 42.7 - 76.0 %    Lymphocyte % 14.9 (L) 19.6 - 45.3 %    Monocyte % 9.3 5.0 - 12.0 %    Eosinophil % 2.6 0.3 - 6.2 %    Basophil % 0.6 0.0 - 1.5 %    Immature Grans % 1.0 (H) 0.0 - 0.5 %    Neutrophils, Absolute 7.61 (H) 1.70 - 7.00 10*3/mm3    Lymphocytes, Absolute 1.58 0.70 - 3.10 10*3/mm3    Monocytes, Absolute 0.99 (H) 0.10 - 0.90 10*3/mm3    Eosinophils, Absolute 0.28 0.00 - 0.40 10*3/mm3    Basophils, Absolute 0.06 0.00 - 0.20 10*3/mm3    Immature Grans, Absolute 0.11 (H) 0.00 - 0.05 10*3/mm3    nRBC 0.0 0.0 - 0.2 /100 WBC         RADIOLOGY  No Radiology Exams Resulted Within Past 24 Hours      MEDICATIONS GIVEN IN ER  Medications   sodium chloride 0.9 % bolus 500 mL (500 mL Intravenous New Bag 5/29/25 1529)         ORDERS PLACED DURING THIS VISIT:  Orders Placed This Encounter   Procedures    CT Head Without Contrast    Comprehensive Metabolic Panel    Protime-INR    High Sensitivity Troponin T    CBC Auto Differential    High Sensitivity Troponin T 1Hr    LIPFELIPE (on-call MD unless specified)    ECG 12 Lead Syncope    ECG 12 Lead Syncope    Initiate Observation Status    CBC & Differential         OUTPATIENT MEDICATION MANAGEMENT:  No current Epic-ordered facility-administered medications on file.     Current Outpatient Medications Ordered in Epic   Medication Sig Dispense Refill    allopurinol (ZYLOPRIM) 100 MG tablet Take 1 tablet by mouth Daily.      amLODIPine (NORVASC) 10 MG tablet Take 1 tablet by mouth Daily.      aspirin 81 MG chewable tablet Chew 1 tablet Daily for 30 days. 30 tablet 0    atorvastatin (LIPITOR) 80 MG tablet Take 1 tablet by mouth Daily.      carvedilol (COREG) 12.5 MG tablet Take 1 tablet by mouth 2 (Two) Times a Day With Meals for 30 days. 60 tablet 0    chlorthalidone (HYGROTON) 25 MG tablet Take 25 mg  by mouth Daily.      clopidogrel (PLAVIX) 75 MG tablet Take 1 tablet by mouth Daily.      famotidine (PEPCID) 20 MG tablet Take 1 tablet by mouth 2 (Two) Times a Day for 30 days. 60 tablet 0    gabapentin (NEURONTIN) 400 MG capsule Take 2 capsules by mouth 3 (Three) Times a Day As Needed.      HYDROcodone-acetaminophen (NORCO) 7.5-325 MG per tablet Take 1 tablet by mouth Every 8 (Eight) Hours As Needed for Moderate Pain.      insulin glargine (LANTUS, SEMGLEE) 100 UNIT/ML injection Inject 75 Units under the skin into the appropriate area as directed Every Night.      Insulin Lispro (humaLOG) 100 UNIT/ML injection Inject 10 Units under the skin into the appropriate area as directed 3 (Three) Times a Day Before Meals.      isosorbide mononitrate (IMDUR) 30 MG 24 hr tablet Take 1 tablet by mouth Daily for 30 days. 30 tablet 0    levETIRAcetam (KEPPRA) 500 MG tablet Take 1 tablet by mouth 2 (Two) Times a Day.      metFORMIN (GLUCOPHAGE) 500 MG tablet Take 500 mg by mouth 2 (Two) Times a Day With Meals.      montelukast (SINGULAIR) 10 MG tablet Take 10 mg by mouth Every Night.      traZODone (DESYREL) 100 MG tablet Take 100 mg by mouth Every Night.           PROCEDURES  Procedures            PROGRESS, DATA ANALYSIS, CONSULTS, AND MEDICAL DECISION MAKING  All labs have been independently interpreted by me.  All radiology studies have been reviewed by me. All EKG's have been independently viewed and interpreted by me.  Discussion below represents my analysis of pertinent findings related to patient's condition, differential diagnosis, treatment plan and final disposition.    Differential diagnosis includes but is not limited to dehydration, anemia, arrhythmia, electrolyte disturbance.      ED Course as of 05/29/25 1618   Thu May 29, 2025   1433 EKG independently interpreted by me    Time 2:27 PM  Sinus 67  Normal P waves and AL interval  QRS-indeterminate axis, unremarkable QRS  ST, T wave-unremarkable    Not significant  change when compared to 5/21/2025 [DB]   1608 Patient with elevated BUN/creatinine consistent with acute renal failure.  Suspect syncope related to renal failure.  Will admit to [facility] for further evaluation treatment. [DB]   1609 CT scan of the brain without obvious acute hemorrhage [DB]   1616 I discussed evaluation treatment this patient with  Off-topic medical admit on behalf of [facility]. [DB]      ED Course User Index  [DB] Nestor Bowie MD             AS OF 16:18 EDT VITALS:    BP - 114/72  HR - 68  TEMP - 98.2 °F (36.8 °C) (Oral)  O2 SATS - 100%    COMPLEXITY OF CARE  65-year-old male with history of diabetes, hypertension hyperlipidemia presents with nausea vomit diarrhea ongoing over several days.  He did have 2 brief syncopal episodes while at home.  Currently at the ED he seems to be back awake and alert and acting normally.  Was hospitalized here with acute renal failure and dehydration recently.  He was discharged home and taken off his ARB.    I did independently evaluate interpret all ED testing notable for the following:    EKG sinus rhythm without obvious acute ischemia or arrhythmia  CBC without evidence of infection, chronic anemia unchanged from baseline.  Chemistries do suggest acute renal failure with BUN or creatinine of 26 and 2.5 for increase from baseline.  Patient also with metabolic acidosis and bicarb of 17.  Suspect volume depletion from recent GI illness.  CT scan of the brain without obvious hemorrhage or fracture.    I suspect patient's syncope related to hypovolemia and acute renal failure.  Will admit for IV fluids, renal consultation.      DIAGNOSIS  Final diagnoses:   Syncope, unspecified syncope type   Acute renal failure, unspecified acute renal failure type   Type 2 diabetes mellitus with hyperglycemia, with long-term current use of insulin         DISPOSITION  ED Disposition       ED Disposition   Decision to Admit    Condition   --    Comment   Level of Care: Telemetry  [5]   Diagnosis: Syncope [172106]   Admitting Physician: JAIME PARK [[id number]]   Attending Physician: JAIME PARK [[id number]]   Is patient appropriate for Inpatient Observation Unit?: Yes [1]                  Please note that portions of this document were completed with a voice recognition program.    Note Disclaimer: At [facility], we believe that sharing information builds trust and better relationships. You are receiving this note because you recently visited [facility]. It is possible you will see health information before a provider has talked with you about it. This kind of information can be easy to misunderstand. To help you fully understand what it means for your health, we urge you to discuss this note with your provider.         Nestor Bowie MD  05/29/25 3575

## 2025-05-29 NOTE — ED NOTES
Ultrasound Inserted IV Site: MAYTE  Diameter: 2.5 in   Depth: 1 cm     Vascular Access Score= 5  1) Palpable / Visible / Distended  2) Palpable / Visible / Not Distended  3) Easily Palpable / Not Visibile  4) Poorly Palpable / Visible  5) Poorly / Nonpalpable / NV      Angiocath visualized and advanced in the venous lumen. Flush visualized superiorly to insertion site in venous lumen. Blood return noted and collected during insertion. No signs of phlebitis noted. Standard IV dressing placed and secured.

## 2025-05-29 NOTE — ED NOTES
Patient to ED via EMS from home c/o syncopal episode. Patient has been having N/V/D the past few days with increasing weakness. Patient was walking in his house and had a syncopal episode, did hit head.    Hx stroke, speech is at baseline per EMS.

## 2025-05-29 NOTE — PLAN OF CARE
Goal Outcome Evaluation:              Outcome Evaluation: VSS, admitted to unit from ED, Ahmed paged awaiting orders, significant other to bring med list tomorrow, will continue plan of care.

## 2025-05-30 LAB
ANION GAP SERPL CALCULATED.3IONS-SCNC: 10 MMOL/L (ref 5–15)
BASOPHILS # BLD AUTO: 0.05 10*3/MM3 (ref 0–0.2)
BASOPHILS NFR BLD AUTO: 0.6 % (ref 0–1.5)
BUN SERPL-MCNC: 21 MG/DL (ref 8–23)
BUN/CREAT SERPL: 13 (ref 7–25)
CALCIUM SPEC-SCNC: 9 MG/DL (ref 8.6–10.5)
CHLORIDE SERPL-SCNC: 109 MMOL/L (ref 98–107)
CO2 SERPL-SCNC: 21 MMOL/L (ref 22–29)
CREAT SERPL-MCNC: 1.62 MG/DL (ref 0.76–1.27)
DEPRECATED RDW RBC AUTO: 48.3 FL (ref 37–54)
EGFRCR SERPLBLD CKD-EPI 2021: 46.8 ML/MIN/1.73
EOSINOPHIL # BLD AUTO: 0.24 10*3/MM3 (ref 0–0.4)
EOSINOPHIL NFR BLD AUTO: 2.7 % (ref 0.3–6.2)
ERYTHROCYTE [DISTWIDTH] IN BLOOD BY AUTOMATED COUNT: 13.9 % (ref 12.3–15.4)
GLUCOSE BLDC GLUCOMTR-MCNC: 150 MG/DL (ref 70–130)
GLUCOSE BLDC GLUCOMTR-MCNC: 173 MG/DL (ref 70–130)
GLUCOSE BLDC GLUCOMTR-MCNC: 202 MG/DL (ref 70–130)
GLUCOSE SERPL-MCNC: 75 MG/DL (ref 65–99)
HCT VFR BLD AUTO: 31.3 % (ref 37.5–51)
HGB BLD-MCNC: 10.4 G/DL (ref 13–17.7)
IMM GRANULOCYTES # BLD AUTO: 0.08 10*3/MM3 (ref 0–0.05)
IMM GRANULOCYTES NFR BLD AUTO: 0.9 % (ref 0–0.5)
LYMPHOCYTES # BLD AUTO: 1.57 10*3/MM3 (ref 0.7–3.1)
LYMPHOCYTES NFR BLD AUTO: 17.9 % (ref 19.6–45.3)
MCH RBC QN AUTO: 31.9 PG (ref 26.6–33)
MCHC RBC AUTO-ENTMCNC: 33.2 G/DL (ref 31.5–35.7)
MCV RBC AUTO: 96 FL (ref 79–97)
MONOCYTES # BLD AUTO: 0.76 10*3/MM3 (ref 0.1–0.9)
MONOCYTES NFR BLD AUTO: 8.7 % (ref 5–12)
NEUTROPHILS NFR BLD AUTO: 6.07 10*3/MM3 (ref 1.7–7)
NEUTROPHILS NFR BLD AUTO: 69.2 % (ref 42.7–76)
NRBC BLD AUTO-RTO: 0 /100 WBC (ref 0–0.2)
PLATELET # BLD AUTO: 209 10*3/MM3 (ref 140–450)
PMV BLD AUTO: 11.2 FL (ref 6–12)
POTASSIUM SERPL-SCNC: 4.4 MMOL/L (ref 3.5–5.2)
QT INTERVAL: 410 MS
QTC INTERVAL: 433 MS
RBC # BLD AUTO: 3.26 10*6/MM3 (ref 4.14–5.8)
SODIUM SERPL-SCNC: 140 MMOL/L (ref 136–145)
SODIUM UR-SCNC: 120 MMOL/L
WBC NRBC COR # BLD AUTO: 8.77 10*3/MM3 (ref 3.4–10.8)

## 2025-05-30 PROCEDURE — G0378 HOSPITAL OBSERVATION PER HR: HCPCS

## 2025-05-30 PROCEDURE — 84300 ASSAY OF URINE SODIUM: CPT | Performed by: INTERNAL MEDICINE

## 2025-05-30 PROCEDURE — 97530 THERAPEUTIC ACTIVITIES: CPT

## 2025-05-30 PROCEDURE — 85025 COMPLETE CBC W/AUTO DIFF WBC: CPT | Performed by: HOSPITALIST

## 2025-05-30 PROCEDURE — 82948 REAGENT STRIP/BLOOD GLUCOSE: CPT

## 2025-05-30 PROCEDURE — 80048 BASIC METABOLIC PNL TOTAL CA: CPT | Performed by: HOSPITALIST

## 2025-05-30 PROCEDURE — 63710000001 INSULIN LISPRO (HUMAN) PER 5 UNITS: Performed by: HOSPITALIST

## 2025-05-30 PROCEDURE — 97162 PT EVAL MOD COMPLEX 30 MIN: CPT

## 2025-05-30 PROCEDURE — 97166 OT EVAL MOD COMPLEX 45 MIN: CPT

## 2025-05-30 RX ORDER — NICOTINE POLACRILEX 4 MG
15 LOZENGE BUCCAL
Status: DISCONTINUED | OUTPATIENT
Start: 2025-05-30 | End: 2025-06-02

## 2025-05-30 RX ORDER — INSULIN LISPRO 100 [IU]/ML
2-7 INJECTION, SOLUTION INTRAVENOUS; SUBCUTANEOUS
Status: DISCONTINUED | OUTPATIENT
Start: 2025-05-30 | End: 2025-06-02 | Stop reason: HOSPADM

## 2025-05-30 RX ORDER — IBUPROFEN 600 MG/1
1 TABLET ORAL
Status: DISCONTINUED | OUTPATIENT
Start: 2025-05-30 | End: 2025-06-02

## 2025-05-30 RX ORDER — AMLODIPINE BESYLATE 5 MG/1
5 TABLET ORAL DAILY
Status: DISCONTINUED | OUTPATIENT
Start: 2025-05-31 | End: 2025-05-31

## 2025-05-30 RX ORDER — DEXTROSE MONOHYDRATE 25 G/50ML
25 INJECTION, SOLUTION INTRAVENOUS
Status: DISCONTINUED | OUTPATIENT
Start: 2025-05-30 | End: 2025-06-02

## 2025-05-30 RX ADMIN — FAMOTIDINE 20 MG: 20 TABLET, FILM COATED ORAL at 20:32

## 2025-05-30 RX ADMIN — ASPIRIN 81 MG CHEWABLE TABLET 81 MG: 81 TABLET CHEWABLE at 08:33

## 2025-05-30 RX ADMIN — INSULIN LISPRO 2 UNITS: 100 INJECTION, SOLUTION INTRAVENOUS; SUBCUTANEOUS at 21:53

## 2025-05-30 RX ADMIN — FAMOTIDINE 20 MG: 20 TABLET, FILM COATED ORAL at 08:33

## 2025-05-30 RX ADMIN — CLOPIDOGREL BISULFATE 75 MG: 75 TABLET, FILM COATED ORAL at 08:33

## 2025-05-30 RX ADMIN — LEVETIRACETAM 500 MG: 500 TABLET, FILM COATED ORAL at 20:32

## 2025-05-30 RX ADMIN — ATORVASTATIN CALCIUM 10 MG: 10 TABLET ORAL at 20:32

## 2025-05-30 RX ADMIN — INSULIN LISPRO 3 UNITS: 100 INJECTION, SOLUTION INTRAVENOUS; SUBCUTANEOUS at 17:53

## 2025-05-30 RX ADMIN — AMLODIPINE BESYLATE 10 MG: 10 TABLET ORAL at 08:38

## 2025-05-30 RX ADMIN — LEVETIRACETAM 500 MG: 500 TABLET, FILM COATED ORAL at 08:33

## 2025-05-30 NOTE — CASE MANAGEMENT/SOCIAL WORK
Continued Stay Note  [facility]     Patient Name: Eric Simms  MRN: [id number]  Today's Date: 5/30/2025    Admit Date: 5/29/2025    Plan: Home with significant other and continued HH services through [facility].   Discharge Plan       Row Name 05/30/25 1501       Plan    Plan Home with significant other and continued HH services through [facility].    Plan Comments Patient is current with [facility]. Justine with [facility] notified of patient's admission. [facility] has accepted case and will continue services upon dc home.      Row Name 05/30/25 1415       Plan    Plan Home with family and current HH    Patient/Family in Agreement with Plan yes    Plan Comments Spoke with the pt at bedside, introduced self and explained CCP role, verified the face sheet and pharmacy information. Pt lives with wife and adult dtr in 1 level home with 3 FERNANDO, he needs assistance with all ADL's, he uses rollator, s/c, and cane. He is current with [facility] RN notified to manage. No SNF history, pt plans home with family and will need LYFT transport home. CCP will follow - Mary DICKEY                   Discharge Codes    No documentation.                 Expected Discharge Date and Time       Expected Discharge Date Expected Discharge Time    Jun 1, 2025               Michelle Contreras RN

## 2025-05-30 NOTE — DISCHARGE PLACEMENT REQUEST
"Eric Simms (65 y.o. Male)       Date of Birth   1960    Social Security Number       Address   [address] [place] [address]    Home Phone   [phone]    MRN   [id number]       Jehovah's witness   Yarsanism    Marital Status                               Admission Date   5/29/2025    Admission Type   Emergency    Admitting Provider   Atilio Melendrez MD    Attending Provider   Atilio Melendrez MD    Department, Room/Bed   [address], [address]       Discharge Date       Discharge Disposition       Discharge Destination                                 Attending Provider: Atilio Melendrez MD    Allergies: No Known Allergies    Isolation: None   Infection: None   Code Status: CPR    Ht: 165.1 cm (65\")   Wt: 91.6 kg (202 lb)    Admission Cmt: None   Principal Problem: Syncope [R55]                   Active Insurance as of 5/29/2025       Primary Coverage       Payor Plan Insurance Group Employer/Plan Group    UNITED [employer] MEDICARE REPLACEMENT UHC MEDICARE ADVANTAGE SNP PPO [id number]       Payor Plan Address Payor Plan Phone Number Payor Plan Fax Number Effective Dates    [address] [address]   4/1/2025 - None Entered    [facility] [id number]         Subscriber Name Subscriber Birth Date Member ID       ERIC SIMMS 1960 [id number]               Secondary Coverage       Payor Plan Insurance Group Employer/Plan Group    [employer] [place] WELLCARE MEDICAID [id number]       Payor Plan Address Payor Plan Phone Number Payor Plan Fax Number Effective Dates    [address] [address] [phone]  2/22/2023 - None Entered    [facility] [id number]         Subscriber Name Subscriber Birth Date Member ID       ERIC SIMMS 1960 [id number]                     Emergency Contacts        (Rel.) Home Phone Work Phone Mobile Phone    DALIA MACHADO (Significant Other) [phone] -- [phone]    Bonnie Pozo (Daughter) -- -- [phone]                "

## 2025-05-30 NOTE — CONSULTS
[facility]                                                                                             Nephrology Initial Consult Note    Patient Identification:  Name: Eric Simms MRN: [id number]  Age: 65 y.o. : 1960  Sex: male  Date:2025    Requesting Physician: As per consult order.  Reason for Consultation: MARLON on CKD  Information from:patient/ family/ chart      History of Present Illness: This is a 65 y.o. year old male  well-known to me with stage III chronic kidney disease  He was discharged 4 days ago, came back to the ER last night with nausea vomiting diarrhea lightheadedness dizziness and near syncope.  He received a 500 cc bolus was started on continuous IV fluids.  BP has been stable with no caleb hypotension.  Initial creatinine 2.5 and is back down to 1.6 today which is near baseline, stage III CKD.  He reports improved oral intake today with no nausea or vomiting.  No shortness of breath or chest pain  The following medical history and medications personally reviewed by me:    Problem List:     Syncope      Past Medical History:  Past Medical History:   Diagnosis Date    Coronary artery disease     Diabetes mellitus     GERD (gastroesophageal reflux disease)     Hyperlipidemia     Hypertension     Peripheral neuropathy     Seizures     Stroke        Past Surgical History:  History reviewed. No pertinent surgical history.     Home Meds:   Medications Prior to Admission   Medication Sig Dispense Refill Last Dose/Taking    allopurinol (ZYLOPRIM) 100 MG tablet Take 1 tablet by mouth Daily.       amLODIPine (NORVASC) 10 MG tablet Take 1 tablet by mouth Daily.       aspirin 81 MG chewable tablet Chew 1 tablet Daily for 30 days. 30 tablet 0     atorvastatin (LIPITOR) 80 MG tablet Take 1 tablet by mouth Daily.       carvedilol (COREG) 12.5 MG tablet Take 1 tablet by mouth 2 (Two) Times  a Day With Meals for 30 days. 60 tablet 0     chlorthalidone (HYGROTON) 25 MG tablet Take 1 tablet by mouth Daily.       clopidogrel (PLAVIX) 75 MG tablet Take 1 tablet by mouth Daily.       famotidine (PEPCID) 20 MG tablet Take 1 tablet by mouth 2 (Two) Times a Day for 30 days. 60 tablet 0     gabapentin (NEURONTIN) 400 MG capsule Take 2 capsules by mouth 3 (Three) Times a Day As Needed.       HYDROcodone-acetaminophen (NORCO) 7.5-325 MG per tablet Take 1 tablet by mouth Every 8 (Eight) Hours As Needed for Moderate Pain.       insulin glargine (LANTUS, SEMGLEE) 100 UNIT/ML injection Inject 75 Units under the skin into the appropriate area as directed Every Night.       Insulin Lispro (humaLOG) 100 UNIT/ML injection Inject 10 Units under the skin into the appropriate area as directed 3 (Three) Times a Day Before Meals.       isosorbide mononitrate (IMDUR) 30 MG 24 hr tablet Take 1 tablet by mouth Daily for 30 days. 30 tablet 0     levETIRAcetam (KEPPRA) 500 MG tablet Take 1 tablet by mouth 2 (Two) Times a Day.       metFORMIN (GLUCOPHAGE) 500 MG tablet Take 500 mg by mouth 2 (Two) Times a Day With Meals.       montelukast (SINGULAIR) 10 MG tablet Take 10 mg by mouth Every Night.       traZODone (DESYREL) 100 MG tablet Take 100 mg by mouth Every Night.          Current Meds:   Current Facility-Administered Medications   Medication Dose Route Frequency Provider Last Rate Last Admin    [START ON 5/31/2025] amLODIPine (NORVASC) tablet 5 mg  5 mg Oral Daily Rick Stallworth MD        aspirin chewable tablet 81 mg  81 mg Oral Daily Atilio Melendrez MD   81 mg at 05/30/25 0833    atorvastatin (LIPITOR) tablet 10 mg  10 mg Oral Nightly Atilio Melendrez MD   10 mg at 05/29/25 2023    clopidogrel (PLAVIX) tablet 75 mg  75 mg Oral Daily Atilio Melendrez MD   75 mg at 05/30/25 0833    dextrose (D50W) (25 g/50 mL) IV injection 25 g  25 g Intravenous Q15 Min PRN Atilio Melendrez MD        dextrose (GLUTOSE) oral gel 15 g  15 g Oral Q15  Min PRN Atilio Melendrez MD        famotidine (PEPCID) tablet 20 mg  20 mg Oral BID Atilio Melendrez MD   20 mg at 25 0833    glucagon (GLUCAGEN) injection 1 mg  1 mg Intramuscular Q15 Min PRN Atilio Melendrez MD        insulin lispro (HUMALOG/ADMELOG) injection 2-7 Units  2-7 Units Subcutaneous 4x Daily AC & at Bedtime Atilio Melendrez MD        levETIRAcetam (KEPPRA) tablet 500 mg  500 mg Oral BID Atilio Melendrez MD   500 mg at 25 0833    sodium chloride 0.9 % infusion  75 mL/hr Intravenous Continuous Atilio Melendrez MD 75 mL/hr at 25 75 mL/hr at 25       Allergies:  No Known Allergies    Social History:   Social History     Socioeconomic History    Marital status:    Tobacco Use    Smoking status: Never    Smokeless tobacco: Never   Vaping Use    Vaping status: Never Used   Substance and Sexual Activity    Alcohol use: Not Currently    Drug use: Not Currently     Comment: crack - last use 15 years ago    Sexual activity: Defer        Family History:  History reviewed. No pertinent family history.     Review of Systems: as per HPI, in addition:    General:      Improved weakness / fatigue,                       No fevers / chills                       no weight loss  HEENT;       no dysphagia   Neck:           No swelling  Respiratory: no cough / congestion/wheezing                      No shortness of air   CV:              No chest pain                       No palpitations  Abdomen/GI: + Nausea: Improved                    no diarrhea, no constipation                      No abdominal pain  :             no dysuria  Endocrine:   No heat or cold intolerance  Skin:           Denies rashes or skin lesions   Vascular:   No edema  Musculoskeletal: Denies joint pain or deformities      Physical Exam:  Vitals:   Temp (24hrs), Av.5 °F (36.9 °C), Min:97.7 °F (36.5 °C), Max:99 °F (37.2 °C)    /62 (BP Location: Left arm, Patient Position: Lying) Comment: Simultaneous filing. User may  "be unaware of other data.  Pulse 78 Comment: Simultaneous filing. User may be unaware of other data.  Temp 99 °F (37.2 °C) (Oral) Comment: Simultaneous filing. User may be unaware of other data.  Resp 16   Ht 165.1 cm (65\")   Wt 91.6 kg (202 lb)   SpO2 100% Comment: Simultaneous filing. User may be unaware of other data.  BMI 33.61 kg/m²   Intake/Output:     Intake/Output Summary (Last 24 hours) at 5/30/2025 1418  Last data filed at 5/29/2025 1652  Gross per 24 hour   Intake 500 ml   Output --   Net 500 ml        Wt Readings from Last 1 Encounters:   05/29/25 1713 91.6 kg (202 lb)       Exam:    General Appearance:  Awake, alert, no acute distress  Well-appearing   Head and Face:  Normocephalic, atraumatic   Eyes:  No icterus   ENMT: Moist mucosa   Neck: Supple  no jugular venous distention   Pulmonary:  Respiratory effort: Normal  Auscultation of lungs: Clear bilaterally  No wheezes or rhonchi  Good air movement, good expansion   Chest wall:  No tenderness or deformity   Cardiovascular:  Auscultation of the heart: Normal rhythm, no murmurs  No edema of bilateral lower extremities   Abdomen:  Abdomen: soft, nontender, normal bowel sounds    Musculoskeletal: No joint swelling or gross deformities    Skin: Skin inspection and palpation: No rash   Lymphatic: No focal lymphadenopathy   Psychiatric: Judgement and insight: normal  Orientation to person place and time: normal  Mood and affect: normal       DATA:  Radiology and Labs:  The following labs independently reviewed by me, additional AM labs ordered  Old records independently reviewed showing recent admission, discharged on the 26th  The following radiologic studies independently viewed by me, findings last week's renal ultrasound no acute disease  Interval notes, chart personally reviewed by me.  I have reviewed and summarized old records as detailed above  Plan of care discussed with patient himself at bedside  New problems include recurrent " MARLON    Dialysis patient access: Not applicable    Risk/ complexity of medical care/ medical decision making: Moderate risk, CKD management, MARLON management fluid electrolyte management      Labs:   Recent Results (from the past 24 hours)   ECG 12 Lead Syncope    Collection Time: 05/29/25  2:27 PM   Result Value Ref Range    QT Interval 410 ms    QTC Interval 433 ms   Comprehensive Metabolic Panel    Collection Time: 05/29/25  3:17 PM    Specimen: Blood   Result Value Ref Range    Glucose 71 65 - 99 mg/dL    BUN 26.0 (H) 8.0 - 23.0 mg/dL    Creatinine 2.54 (H) 0.76 - 1.27 mg/dL    Sodium 133 (L) 136 - 145 mmol/L    Potassium 4.9 3.5 - 5.2 mmol/L    Chloride 103 98 - 107 mmol/L    CO2 17.0 (L) 22.0 - 29.0 mmol/L    Calcium 8.9 8.6 - 10.5 mg/dL    Total Protein 6.6 6.0 - 8.5 g/dL    Albumin 3.8 3.5 - 5.2 g/dL    ALT (SGPT) 26 1 - 41 U/L    AST (SGOT) 22 1 - 40 U/L    Alkaline Phosphatase 51 39 - 117 U/L    Total Bilirubin 0.3 0.0 - 1.2 mg/dL    Globulin 2.8 gm/dL    A/G Ratio 1.4 g/dL    BUN/Creatinine Ratio 10.2 7.0 - 25.0    Anion Gap 13.0 5.0 - 15.0 mmol/L    eGFR 27.3 (L) >60.0 mL/min/1.73   Protime-INR    Collection Time: 05/29/25  3:17 PM    Specimen: Blood   Result Value Ref Range    Protime 13.9 11.7 - 14.2 Seconds    INR 1.08 0.90 - 1.10   High Sensitivity Troponin T    Collection Time: 05/29/25  3:17 PM    Specimen: Blood   Result Value Ref Range    HS Troponin T 36 (H) <22 ng/L   CBC Auto Differential    Collection Time: 05/29/25  3:17 PM    Specimen: Blood   Result Value Ref Range    WBC 10.63 3.40 - 10.80 10*3/mm3    RBC 3.21 (L) 4.14 - 5.80 10*6/mm3    Hemoglobin 10.4 (L) 13.0 - 17.7 g/dL    Hematocrit 31.2 (L) 37.5 - 51.0 %    MCV 97.2 (H) 79.0 - 97.0 fL    MCH 32.4 26.6 - 33.0 pg    MCHC 33.3 31.5 - 35.7 g/dL    RDW 13.3 12.3 - 15.4 %    RDW-SD 47.5 37.0 - 54.0 fl    MPV 11.1 6.0 - 12.0 fL    Platelets 207 140 - 450 10*3/mm3    Neutrophil % 71.6 42.7 - 76.0 %    Lymphocyte % 14.9 (L) 19.6 - 45.3 %     Monocyte % 9.3 5.0 - 12.0 %    Eosinophil % 2.6 0.3 - 6.2 %    Basophil % 0.6 0.0 - 1.5 %    Immature Grans % 1.0 (H) 0.0 - 0.5 %    Neutrophils, Absolute 7.61 (H) 1.70 - 7.00 10*3/mm3    Lymphocytes, Absolute 1.58 0.70 - 3.10 10*3/mm3    Monocytes, Absolute 0.99 (H) 0.10 - 0.90 10*3/mm3    Eosinophils, Absolute 0.28 0.00 - 0.40 10*3/mm3    Basophils, Absolute 0.06 0.00 - 0.20 10*3/mm3    Immature Grans, Absolute 0.11 (H) 0.00 - 0.05 10*3/mm3    nRBC 0.0 0.0 - 0.2 /100 WBC   High Sensitivity Troponin T 1Hr    Collection Time: 05/29/25  4:57 PM    Specimen: Arm, Left; Blood   Result Value Ref Range    HS Troponin T 35 (H) <22 ng/L    Troponin T Numeric Delta -1 ng/L    Troponin T % Delta -3 Abnormal if >/= 20%   Basic Metabolic Panel    Collection Time: 05/30/25  6:09 AM    Specimen: Blood   Result Value Ref Range    Glucose 75 65 - 99 mg/dL    BUN 21.0 8.0 - 23.0 mg/dL    Creatinine 1.62 (H) 0.76 - 1.27 mg/dL    Sodium 140 136 - 145 mmol/L    Potassium 4.4 3.5 - 5.2 mmol/L    Chloride 109 (H) 98 - 107 mmol/L    CO2 21.0 (L) 22.0 - 29.0 mmol/L    Calcium 9.0 8.6 - 10.5 mg/dL    BUN/Creatinine Ratio 13.0 7.0 - 25.0    Anion Gap 10.0 5.0 - 15.0 mmol/L    eGFR 46.8 (L) >60.0 mL/min/1.73   CBC Auto Differential    Collection Time: 05/30/25  6:09 AM    Specimen: Blood   Result Value Ref Range    WBC 8.77 3.40 - 10.80 10*3/mm3    RBC 3.26 (L) 4.14 - 5.80 10*6/mm3    Hemoglobin 10.4 (L) 13.0 - 17.7 g/dL    Hematocrit 31.3 (L) 37.5 - 51.0 %    MCV 96.0 79.0 - 97.0 fL    MCH 31.9 26.6 - 33.0 pg    MCHC 33.2 31.5 - 35.7 g/dL    RDW 13.9 12.3 - 15.4 %    RDW-SD 48.3 37.0 - 54.0 fl    MPV 11.2 6.0 - 12.0 fL    Platelets 209 140 - 450 10*3/mm3    Neutrophil % 69.2 42.7 - 76.0 %    Lymphocyte % 17.9 (L) 19.6 - 45.3 %    Monocyte % 8.7 5.0 - 12.0 %    Eosinophil % 2.7 0.3 - 6.2 %    Basophil % 0.6 0.0 - 1.5 %    Immature Grans % 0.9 (H) 0.0 - 0.5 %    Neutrophils, Absolute 6.07 1.70 - 7.00 10*3/mm3    Lymphocytes, Absolute 1.57  0.70 - 3.10 10*3/mm3    Monocytes, Absolute 0.76 0.10 - 0.90 10*3/mm3    Eosinophils, Absolute 0.24 0.00 - 0.40 10*3/mm3    Basophils, Absolute 0.05 0.00 - 0.20 10*3/mm3    Immature Grans, Absolute 0.08 (H) 0.00 - 0.05 10*3/mm3    nRBC 0.0 0.0 - 0.2 /100 WBC   POC Glucose Once    Collection Time: 05/30/25  2:15 PM    Specimen: Blood   Result Value Ref Range    Glucose 150 (H) 70 - 130 mg/dL       Radiology:  Imaging Results (Last 24 Hours)       Procedure Component Value Units Date/Time    CT Head Without Contrast [[id number]] Collected: 05/29/25 1611     Updated: 05/29/25 1620    Narrative:      CT HEAD WO CONTRAST-     INDICATIONS: Syncope     TECHNIQUE: Radiation dose reduction techniques were utilized, including  automated exposure control and exposure modulation based on body size.  Noncontrast head CT     COMPARISON: 5/21/2025     FINDINGS:           No acute intracranial hemorrhage, midline shift or mass effect. No acute  territorial infarct is identified. Encephalomalacia is noted in the left  parietal lobe, left frontal lobe     Mild periventricular hypodensities suggest chronic small vessel ischemic  change in a patient this age.     Arterial calcifications are seen at the base of the brain.     Ventricles, cisterns, cerebral sulci are unremarkable for patient age.     Partial ethmoid air cell opacification is noted. The visualized  paranasal sinuses, orbits, mastoid air cells are otherwise unremarkable.             Impression:         No acute intracranial hemorrhage or hydrocephalus. Chronic changes of  the brain. If there is further clinical concern, MRI could be considered  for further evaluation.     This report was finalized on 5/29/2025 4:17 PM by Dr. Enrique Milner M.D on Workstation: [id number]                    ASSESSMENT:   MARLON, prerenal and improved  CKD stage IIIa, recent baseline creatinine around 1.6  Hypotension, improved with fluids  Chronic hypertension  Diabetes mellitus type 2, poorly  controlled, A1c 13  Anemia of CKD  Hyponatremia, improved    Syncope  Metabolic acidosis      DISCUSSION/PLAN:   Agree with continued IV fluids  Renal function approaching his prior baseline creatinine  Decrease amlodipine dosing and monitor BP trends  Monitor electrolytes and replace as needed  Encourage oral fluid intake, nutrition and hydration  Continue to monitor electrolytes and volume closely, avoid IV contrast and nephrotoxic medications  Follow-up a.m. labs    I appreciate the consult request.  Please send me a secure chat message with any nonurgent questions regarding patient care.  For any urgent patient care issues please call my office number below.      Rick Stallworth MD  [facility]  Office phone number: [phone]  Answering service phone number: [phone]      5/30/2025        Dictation via Dragon dictation software

## 2025-05-30 NOTE — PLAN OF CARE
Goal Outcome Evaluation:  Plan of Care Reviewed With: patient        Progress: no change  Outcome Evaluation: Pt alert and oriented X4. Vitals stable. No syncopal episodes overnight, no c/o dizziness. Bed alarm on.

## 2025-05-30 NOTE — PLAN OF CARE
Goal Outcome Evaluation:  Plan of Care Reviewed With: patient        Progress: improving  Outcome Evaluation: 65 y.o. male admitted to [facility] for N/V/D and syncopal episode at home.  Post work up remains ongoing.   At baseline, patient lives with Sig other/step daughter in apartment (3 F[address]) Independent with ADLs and functional mobility (Rollator and Cane).  A&Ox4, BUE WFL, 3+/5. 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 Patient resting in bed upon arrival.  BP while supine 139/71.  Patient able to transition to EOB with Mod I (BP while sitting 145/74).  STS from EOB with SBA and Rw.  Patient able to perform household distance ambulation with CGA/SBA.  Patient agreeabole to sit in recliner chair at end of tx.  BP while standing 151/74.  Patient reclined in chair with all needs within reach. No dizziness, nausea or LOB noted. Patient anxious to d/c back to home.  OT would be beneficial to address underlying physical deficits nd increase ADLs.  Anticipate patient d/c back to home with  assistance as needed.    Anticipated Discharge Disposition (OT): home with assist, home with home health

## 2025-05-30 NOTE — THERAPY EVALUATION
Patient Name: Eric Simms  : 1960    MRN: [id number]                              Today's Date: 2025       Admit Date: 2025    Visit Dx:     ICD-10-CM ICD-9-CM   1. Syncope, unspecified syncope type  R55 780.2   2. Acute renal failure, unspecified acute renal failure type  N17.9 584.9   3. Type 2 diabetes mellitus with hyperglycemia, with long-term current use of insulin  E11.65 250.00    Z79.4 790.29     V58.67     Patient Active Problem List   Diagnosis    Chest pain, unspecified type    Type 2 diabetes mellitus with hyperglycemia    HTN (hypertension)    Obesity (BMI 30-39.9)    Anemia, chronic disease    CKD (chronic kidney disease) stage 2, GFR 60-89 ml/min    Seizure disorder    MRALON (acute kidney injury)    Acute kidney injury    Syncope     Past Medical History:   Diagnosis Date    Coronary artery disease     Diabetes mellitus     GERD (gastroesophageal reflux disease)     Hyperlipidemia     Hypertension     Peripheral neuropathy     Seizures     Stroke      History reviewed. No pertinent surgical history.   General Information       Row Name 25 1335          OT Time and Intention    Document Type evaluation  -JR     Mode of Treatment occupational therapy  -JR     Patient Effort excellent  -JR     Symptoms Noted During/After Treatment none  -JR       Row Name 25 6906          General Information    Patient Profile Reviewed yes  -JR     Prior Level of Function independent:;ADL's  -JR     Existing Precautions/Restrictions no known precautions/restrictions  -JR     Barriers to Rehab none identified  -JR       Row Name 25 6343          Living Environment    Current Living Arrangements apartment  -JR     People in Home significant other;child(filipe), adult  -JR       Row Name 25 1822          Home Main Entrance    Number of Stairs, Main Entrance three  -JR     Stair Railings, Main Entrance railings safe and in good condition  -JR       Row Name 25 3524          Stairs  Within Home, Primary    Number of Stairs, Within Home, Primary none  -JR     Stair Railings, Within Home, Primary none  -JR       Row Name 05/30/25 1335          Cognition    Orientation Status (Cognition) oriented x 4  -JR       Row Name 05/30/25 1335          Safety Issues/Impairments Affecting Functional Mobility    Comment, Safety Issues/Impairments (Mobility) gait belt and non skid socks donned  -               User Key  (r) = Recorded By, (t) = Taken By, (c) = Cosigned By      Initials Name Provider Type    Reese Smalls OT Occupational Therapist                     Mobility/ADL's       Row Name 05/30/25 1336          Bed Mobility    Bed Mobility supine-sit  -     Supine-Sit [place] (Bed Mobility) modified independence  -       Row Name 05/30/25 1336          Transfers    Transfers sit-stand transfer  -       Row Name 05/30/25 1336          Sit-Stand Transfer    Sit-Stand [place] (Transfers) standby assist;verbal cues  -     Assistive Device (Sit-Stand Transfers) walker, front-wheeled  -       Row Name 05/30/25 1336          Functional Mobility    Patient was able to Ambulate yes  -               User Key  (r) = Recorded By, (t) = Taken By, (c) = Cosigned By      Initials Name Provider Type    Reese Smalls OT Occupational Therapist                   Obj/Interventions       Row Name 05/30/25 1336          Sensory Assessment (Somatosensory)    Sensory Assessment (Somatosensory) sensation intact  -       Row Name 05/30/25 1336          Vision Assessment/Intervention    Visual Impairment/Limitations WFL  -       Row Name 05/30/25 1336          Range of Motion Comprehensive    General Range of Motion bilateral upper extremity ROM WFL  -     Comment, General Range of Motion BUE WFL  -       Row Name 05/30/25 1336          Strength Comprehensive (MMT)    General Manual Muscle Testing (MMT) Assessment upper extremity strength deficits identified  -     Comment, General Manual  Muscle Testing (MMT) Assessment BUE 3+/5  -JR       Row Name 05/30/25 1336          Balance    Balance Assessment sitting static balance;sitting dynamic balance;standing static balance;standing dynamic balance  -JR     Static Sitting Balance supervision  -JR     Dynamic Sitting Balance supervision  -JR     Position, Sitting Balance sitting edge of bed  -JR     Static Standing Balance standby assist;contact guard  -JR     Dynamic Standing Balance standby assist;contact guard  -JR     Position/Device Used, Standing Balance supported;walker, front-wheeled  -JR               User Key  (r) = Recorded By, (t) = Taken By, (c) = Cosigned By      Initials Name Provider Type    Reese Smalls OT Occupational Therapist                   Goals/Plan       Row Name 05/30/25 1346          Bed Mobility Goal 1 (OT)    Activity/Assistive Device (Bed Mobility Goal 1, OT) bed mobility activities, all  -JR     [place] Level/Cues Needed (Bed Mobility Goal 1, OT) modified independence  -JR     Time Frame (Bed Mobility Goal 1, OT) short term goal (STG);2 weeks  -JR     Progress/Outcomes (Bed Mobility Goal 1, OT) new goal  -       Row Name 05/30/25 1346          Transfer Goal 1 (OT)    Activity/Assistive Device (Transfer Goal 1, OT) transfers, all  -JR     [place] Level/Cues Needed (Transfer Goal 1, OT) modified independence  -JR     Time Frame (Transfer Goal 1, OT) short term goal (STG);2 weeks  -JR     Progress/Outcome (Transfer Goal 1, OT) new goal  -JR       Row Name 05/30/25 1346          Bathing Goal 1 (OT)    Activity/Device (Bathing Goal 1, OT) bathing skills, all  -JR     [place] Level/Cues Needed (Bathing Goal 1, OT) modified independence  -JR     Time Frame (Bathing Goal 1, OT) short term goal (STG);2 weeks  -JR     Progress/Outcomes (Bathing Goal 1, OT) new goal  -JR       Row Name 05/30/25 1346          Dressing Goal 1 (OT)    Activity/Device (Dressing Goal 1, OT) dressing skills, all  -JR     [place]/Cues  Needed (Dressing Goal 1, OT) modified independence  -JR     Time Frame (Dressing Goal 1, OT) short term goal (STG);2 weeks  -JR     Progress/Outcome (Dressing Goal 1, OT) new goal  -       Row Name 05/30/25 1346          Toileting Goal 1 (OT)    Activity/Device (Toileting Goal 1, OT) toileting skills, all  -JR     [place] Level/Cues Needed (Toileting Goal 1, OT) modified independence  -JR     Time Frame (Toileting Goal 1, OT) short term goal (STG);2 weeks  -JR     Progress/Outcome (Toileting Goal 1, OT) new [facility]  -       Row Name 05/30/25 1346          Grooming Goal 1 (OT)    Activity/Device (Grooming Goal 1, OT) grooming skills, all  -JR     [place] (Grooming Goal 1, OT) modified independence  -JR     Time Frame (Grooming Goal 1, OT) short term goal (STG);2 weeks  -JR     Progress/Outcome (Grooming Goal 1, OT) new goal  -JR       Row Name 05/30/25 1346          Therapy Assessment/Plan (OT)    Planned Therapy Interventions (OT) activity tolerance training;adaptive equipment training;BADL retraining;neuromuscular control/coordination retraining;functional balance retraining;occupation/activity based interventions;passive ROM/stretching;transfer/mobility retraining;strengthening exercise;ROM/therapeutic exercise  -               User Key  (r) = Recorded By, (t) = Taken By, (c) = Cosigned By      Initials Name Provider Type    Reese Smalls, OT Occupational Therapist                   Clinical Impression       Row Name 05/30/25 5457          Pain Assessment    Pretreatment Pain Rating 0/10 - no pain  -     Posttreatment Pain Rating 0/10 - no pain  -       Row Name 05/30/25 1336          Plan of Care Review    Plan of Care Reviewed With patient  -     Progress improving  -JR     Outcome Evaluation 65 y.o. male admitted to [facility] for N/V/D and syncopal episode at home.  Post work up remains ongoing.   At baseline, patient lives with Sig other/step daughter in apartment ([address]) Independent with ADLs and  functional mobility (Rollator and Cane).  A&Ox4, BUE WFL, 3+/5. 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 Patient resting in bed upon arrival.  BP while supine 139/71.  Patient able to transition to EOB with Mod I (BP while sitting 145/74).  STS from EOB with SBA and Rw.  Patient able to perform household distance ambulation with CGA/SBA.  Patient agreeabole to sit in recliner chair at end of tx.  BP while standing 151/74.  Patient reclined in chair with all needs within reach. No dizziness, nausea or LOB noted. Patient anxious to d/c back to home.  OT would be beneficial to address underlying physical deficits nd increase ADLs.  Anticipate patient d/c back to home with HH assistance as needed.  -       Row Name 05/30/25 6906          Therapy Assessment/Plan (OT)    Rehab Potential (OT) good  -     Criteria for Skilled Therapeutic Interventions Met (OT) yes;skilled treatment is necessary  -     Therapy Frequency (OT) 3 times/wk  -       Row Name 05/30/25 4621          Therapy Plan Review/Discharge Plan (OT)    Anticipated Discharge Disposition (OT) home with assist;home with home health  -       Row Name 05/30/25 4941          Vital Signs    O2 Delivery Pre Treatment room air  -JR     O2 Delivery Intra Treatment room air  -JR     O2 Delivery Post Treatment room air  -JR     Pre Patient Position Supine  -JR     Intra Patient Position Standing  -JR     Post Patient Position Sitting  -JR       Row Name 05/30/25 3836          Positioning and Restraints    Pre-Treatment Position in bed  -JR     Post Treatment Position chair  -JR     In Chair notified nsg;reclined;call light within reach;encouraged to call for assist;exit alarm on  -JR               User Key  (r) = Recorded By, (t) = Taken By, (c) = Cosigned By      Initials Name Provider Type    Reese Smalls, OT Occupational Therapist                   Outcome Measures       Row Name 05/30/25 8113          How much help from another is currently needed...     Putting on and taking off regular lower body clothing? 3  -JR     Bathing (including washing, rinsing, and drying) 3  -JR     Toileting (which includes using toilet bed pan or urinal) 3  -JR     Putting on and taking off regular upper body clothing 3  -JR     Taking care of personal grooming (such as brushing teeth) 4  -JR     Eating meals 4  -JR     AM-PAC 6 Clicks Score (OT) 20  -       Row Name 05/30/25 0800          How much help from another person do you currently need...    Turning from your back to your side while in flat bed without using bedrails? 4  -JW     Moving from lying on back to sitting on the side of a flat bed without bedrails? 4  -JW     Moving to and from a bed to a chair (including a wheelchair)? 4  -JW     Standing up from a chair using your arms (e.g., wheelchair, bedside chair)? 3  -JW     Climbing 3-5 steps with a railing? 3  -JW     To walk in hospital room? 3  -JW     AM-PAC 6 Clicks Score (PT) 21  -       Row Name 05/30/25 1347          Modified [place] Scale    Modified [place] Scale 3 - Moderate disability.  Requiring some help, but able to walk without assistance.  -       Row Name 05/30/25 1347          Functional Assessment    Outcome Measure Options AM-PAC 6 Clicks Daily Activity (OT);Modified [place]  -               User Key  (r) = Recorded By, (t) = Taken By, (c) = Cosigned By      Initials Name Provider Type    Micaela Shepherd, RN Registered Nurse    Reese Smalls OT Occupational Therapist                    Occupational Therapy Education       Title: PT OT SLP Therapies (In Progress)       Topic: Occupational Therapy (Done)       Point: ADL training (Done)       Learning Progress Summary            MICHELLE Rojas VU by  at 5/30/2025 1347    Comment: Role of OT                      Point: Home exercise program (Done)       Learning Progress Summary            MICHELLE Rojas VU by JR at 5/30/2025 1347    Comment: Role of OT                      Point:  Precautions (Done)       Learning Progress Summary            Patient MICHELLE Diaz VU by  at 5/30/2025 1347    Comment: Role of OT                      Point: Body mechanics (Done)       Learning Progress Summary            Patient MICHELLE Diaz VU by  at 5/30/2025 1347    Comment: Role of OT                                      User Key       Initials Effective Dates Name Provider Type Discipline     07/24/24 -  Reese Crow, OT Occupational Therapist OT                  OT Recommendation and Plan  Planned Therapy Interventions (OT): activity tolerance training, adaptive equipment training, BADL retraining, neuromuscular control/coordination retraining, functional balance retraining, occupation/activity based interventions, passive ROM/stretching, transfer/mobility retraining, strengthening exercise, ROM/therapeutic exercise  Therapy Frequency (OT): 3 times/wk  Plan of Care Review  Plan of Care Reviewed With: patient  Progress: improving  Outcome Evaluation: 65 y.o. male admitted to [facility] for N/V/D and syncopal episode at home.  Post work up remains ongoing.   At baseline, patient lives with Sig other/step daughter in apartment ([address]) Independent with ADLs and functional mobility (Rollator and Cane).  A&Ox4, BUE WFL, 3+/5. 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 Patient resting in bed upon arrival.  BP while supine 139/71.  Patient able to transition to EOB with Mod I (BP while sitting 145/74).  STS from EOB with SBA and Rw.  Patient able to perform household distance ambulation with CGA/SBA.  Patient agreeabole to sit in recliner chair at end of tx.  BP while standing 151/74.  Patient reclined in chair with all needs within reach. No dizziness, nausea or LOB noted. Patient anxious to d/c back to home.  OT would be beneficial to address underlying physical deficits nd increase ADLs.  Anticipate patient d/c back to home with HH assistance as needed.     Time Calculation:   Evaluation Complexity (OT)  Review  Occupational Profile/Medical/Therapy History Complexity: expanded/moderate complexity  Assessment, Occupational Performance/Identification of Deficit Complexity: 3-5 performance deficits  Clinical Decision Making Complexity (OT): detailed assessment/moderate complexity  Overall Complexity of Evaluation (OT): moderate complexity     Time Calculation- OT       Row Name 05/30/25 1348 05/30/25 1347          Time Calculation- OT    OT Start Time -- 0934  -JR     OT Stop Time -- 0957  -JR     OT Time Calculation (min) -- 23 min  -JR     Total Timed Code Minutes- OT -- 13 minute(s)  -JR     OT Received On -- 05/30/25  -     OT - Next Appointment -- 06/02/25  -JR     OT Goal Re-Cert Due Date -- 06/13/25  -JR        Timed Charges    09908 - OT Therapeutic Activity Minutes 13  -JR 23  -JR        Untimed Charges    OT Eval/Re-eval Minutes -- 10  -JR        Total Minutes    Timed Charges Total Minutes 13  -JR 23  -JR     Untimed Charges Total Minutes -- 10  -JR      Total Minutes 13  -JR 33  -JR               User Key  (r) = Recorded By, (t) = Taken By, (c) = Cosigned By      Initials Name Provider Type    JR Reese Crow OT Occupational Therapist                  Therapy Charges for Today       Code Description Service Date Service Provider Modifiers Qty    18962770176 HC OT THERAPEUTIC ACT EA 15 MIN 5/30/2025 Reese Crow OT GO 1    50133389916 HC OT EVAL MOD COMPLEXITY 3 5/30/2025 Reese Crow OT GO 1                 Reese Crow OT  5/30/2025

## 2025-05-30 NOTE — H&P
"History and physical    Primary care physician      Chief complaint  Generalized weakness    History of present illness  65-year-old male with history of diabetes hypertension hyperlipidemia seizure disorder CVA with dysarthria depression and chronic kidney disease stage III who was recently discharged from the hospital after management of acute kidney injury presented to [facility] with generalized weakness and syncopal episode.  Patient has nausea vomiting diarrhea for last several days and felt lightheaded.  Patient denies any headache vision problem speech problem or focal weakness.  Patient workup in ER revealed again acute kidney injury admitted for management.    PAST MEDICAL HISTORY   Coronary artery disease      Diabetes mellitus      Gastroesophageal reflux disease      Hyperlipidemia      Hypertension      Peripheral neuropathy      Seizures      Stroke        PAST SURGICAL HISTORY     No past surgical history on file.     FAMILY HISTORY  No family history on file.     SOCIAL HISTORY                 Socioeconomic History    Marital status:    Tobacco Use    Smoking status: Never    Smokeless tobacco: Never   Vaping Use    Vaping status: Never Used   Substance and Sexual Activity    Alcohol use: Not Currently    Drug use: Not Currently       Comment: crack - last use 15 years ago    Sexual activity: Defer         ALLERGIES  Patient has no known allergies.  Home medications reviewed     REVIEW OF SYSTEMS  All systems reviewed and negative except for those discussed in HPI.     PHYSICAL EXAM   Blood pressure 126/62, pulse 78, temperature 99 °F (37.2 °C), temperature source Oral, resp. rate 16, height 165.1 cm (65\"), weight 91.6 kg (202 lb), SpO2 100%.    GENERAL: Alert well-appearing male in no obvious distress.    SKIN: Warm, dry.    HENT: Normocephalic, atraumatic  EYES: no scleral icterus  CV: regular rhythm, regular rate-no murmur  RESPIRATORY: normal effort, moving air " bilaterally  ABDOMEN: soft, nontender, nondistended bowel sounds positive  MUSCULOSKELETAL: Spine-no significant bony tenderness to palpation, good range of motion.  NEURO: alert, moves all extremities, follows commands     LAB RESULTS  Lab Results (last 24 hours)       Procedure Component Value Units Date/Time    Basic Metabolic Panel [[id number]]  (Abnormal) Collected: 05/30/25 0609    Specimen: Blood Updated: 05/30/25 0741     Glucose 75 mg/dL      BUN 21.0 mg/dL      Creatinine 1.62 mg/dL      Sodium 140 mmol/L      Potassium 4.4 mmol/L      Chloride 109 mmol/L      CO2 21.0 mmol/L      Calcium 9.0 mg/dL      BUN/Creatinine Ratio 13.0     Anion Gap 10.0 mmol/L      eGFR 46.8 mL/min/1.73     Narrative:      GFR Categories in Chronic Kidney Disease (CKD)              GFR Category          GFR (mL/min/1.73)    Interpretation  G1                    90 or greater        Normal or high (1)  G2                    60-89                Mild decrease (1)  G3a                   45-59                Mild to moderate decrease  G3b                   30-44                Moderate to severe decrease  G4                    15-29                Severe decrease  G5                    14 or less           Kidney failure    (1)In the absence of evidence of kidney disease, neither GFR category G1 or G2 fulfill the criteria for CKD.    eGFR calculation 2021 CKD-EPI creatinine equation, which does not include race as a factor    CBC & Differential [[id number]]  (Abnormal) Collected: 05/30/25 0609    Specimen: Blood Updated: 05/30/25 0638    Narrative:      The following orders were created for panel order CBC & Differential.  Procedure                               Abnormality         Status                     ---------                               -----------         ------                     CBC Auto Differential[[id number]]        Abnormal            Final result                 Please view results for these tests on the individual  orders.    CBC Auto Differential [[id number]]  (Abnormal) Collected: 05/30/25 0609    Specimen: Blood Updated: 05/30/25 0638     WBC 8.77 10*3/mm3      RBC 3.26 10*6/mm3      Hemoglobin 10.4 g/dL      Hematocrit 31.3 %      MCV 96.0 fL      MCH 31.9 pg      MCHC 33.2 g/dL      RDW 13.9 %      RDW-SD 48.3 fl      MPV 11.2 fL      Platelets 209 10*3/mm3      Neutrophil % 69.2 %      Lymphocyte % 17.9 %      Monocyte % 8.7 %      Eosinophil % 2.7 %      Basophil % 0.6 %      Immature Grans % 0.9 %      Neutrophils, Absolute 6.07 10*3/mm3      Lymphocytes, Absolute 1.57 10*3/mm3      Monocytes, Absolute 0.76 10*3/mm3      Eosinophils, Absolute 0.24 10*3/mm3      Basophils, Absolute 0.05 10*3/mm3      Immature Grans, Absolute 0.08 10*3/mm3      nRBC 0.0 /100 WBC     High Sensitivity Troponin T 1Hr [[id number]]  (Abnormal) Collected: 05/29/25 1657    Specimen: Blood from Arm, Left Updated: 05/29/25 1743     HS Troponin T 35 ng/L      Troponin T Numeric Delta -1 ng/L      Troponin T % Delta -3    Narrative:      High Sensitive Troponin T Reference Range:  <14.0 ng/L- Negative Female for AMI  <22.0 ng/L- Negative Male for AMI  >=14 - Abnormal Female indicating possible myocardial injury.  >=22 - Abnormal Male indicating possible myocardial injury.   Clinicians would have to utilize clinical acumen, EKG, Troponin, and serial changes to determine if it is an Acute Myocardial Infarction or myocardial injury due to an underlying chronic condition.         Comprehensive Metabolic Panel [[id number]]  (Abnormal) Collected: 05/29/25 1517    Specimen: Blood Updated: 05/29/25 1608     Glucose 71 mg/dL      BUN 26.0 mg/dL      Creatinine 2.54 mg/dL      Sodium 133 mmol/L      Potassium 4.9 mmol/L      Chloride 103 mmol/L      CO2 17.0 mmol/L      Calcium 8.9 mg/dL      Total Protein 6.6 g/dL      Albumin 3.8 g/dL      ALT (SGPT) 26 U/L      AST (SGOT) 22 U/L      Alkaline Phosphatase 51 U/L      Total Bilirubin 0.3 mg/dL      Globulin  2.8 gm/dL      A/G Ratio 1.4 g/dL      BUN/Creatinine Ratio 10.2     Anion Gap 13.0 mmol/L      eGFR 27.3 mL/min/1.73     Narrative:      GFR Categories in Chronic Kidney Disease (CKD)              GFR Category          GFR (mL/min/1.73)    Interpretation  G1                    90 or greater        Normal or high (1)  G2                    60-89                Mild decrease (1)  G3a                   45-59                Mild to moderate decrease  G3b                   30-44                Moderate to severe decrease  G4                    15-29                Severe decrease  G5                    14 or less           Kidney failure    (1)In the absence of evidence of kidney disease, neither GFR category G1 or G2 fulfill the criteria for CKD.    eGFR calculation 2021 CKD-EPI creatinine equation, which does not include race as a factor    High Sensitivity Troponin T [[id number]]  (Abnormal) Collected: 05/29/25 1517    Specimen: Blood Updated: 05/29/25 1601     HS Troponin T 36 ng/L     Narrative:      High Sensitive Troponin T Reference Range:  <14.0 ng/L- Negative Female for AMI  <22.0 ng/L- Negative Male for AMI  >=14 - Abnormal Female indicating possible myocardial injury.  >=22 - Abnormal Male indicating possible myocardial injury.   Clinicians would have to utilize clinical acumen, EKG, Troponin, and serial changes to determine if it is an Acute Myocardial Infarction or myocardial injury due to an underlying chronic condition.         Protime-INR [[id number]]  (Normal) Collected: 05/29/25 1517    Specimen: Blood Updated: 05/29/25 1550     Protime 13.9 Seconds      INR 1.08    CBC & Differential [[id number]]  (Abnormal) Collected: 05/29/25 1517    Specimen: Blood Updated: 05/29/25 1537    Narrative:      The following orders were created for panel order CBC & Differential.  Procedure                               Abnormality         Status                     ---------                               -----------          ------                     CBC Auto Differential[[id number]]        Abnormal            Final result                 Please view results for these tests on the individual orders.    CBC Auto Differential [[id number]]  (Abnormal) Collected: 05/29/25 1517    Specimen: Blood Updated: 05/29/25 1537     WBC 10.63 10*3/mm3      RBC 3.21 10*6/mm3      Hemoglobin 10.4 g/dL      Hematocrit 31.2 %      MCV 97.2 fL      MCH 32.4 pg      MCHC 33.3 g/dL      RDW 13.3 %      RDW-SD 47.5 fl      MPV 11.1 fL      Platelets 207 10*3/mm3      Neutrophil % 71.6 %      Lymphocyte % 14.9 %      Monocyte % 9.3 %      Eosinophil % 2.6 %      Basophil % 0.6 %      Immature Grans % 1.0 %      Neutrophils, Absolute 7.61 10*3/mm3      Lymphocytes, Absolute 1.58 10*3/mm3      Monocytes, Absolute 0.99 10*3/mm3      Eosinophils, Absolute 0.28 10*3/mm3      Basophils, Absolute 0.06 10*3/mm3      Immature Grans, Absolute 0.11 10*3/mm3      nRBC 0.0 /100 WBC           Imaging Results (Last 24 Hours)       Procedure Component Value Units Date/Time    CT Head Without Contrast [[id number]] Collected: 05/29/25 1611     Updated: 05/29/25 1620    Narrative:      CT HEAD WO CONTRAST-     INDICATIONS: Syncope     TECHNIQUE: Radiation dose reduction techniques were utilized, including  automated exposure control and exposure modulation based on body size.  Noncontrast head CT     COMPARISON: 5/21/2025     FINDINGS:           No acute intracranial hemorrhage, midline shift or mass effect. No acute  territorial infarct is identified. Encephalomalacia is noted in the left  parietal lobe, left frontal lobe     Mild periventricular hypodensities suggest chronic small vessel ischemic  change in a patient this age.     Arterial calcifications are seen at the base of the brain.     Ventricles, cisterns, cerebral sulci are unremarkable for patient age.     Partial ethmoid air cell opacification is noted. The visualized  paranasal sinuses, orbits, mastoid air cells  are otherwise unremarkable.             Impression:         No acute intracranial hemorrhage or hydrocephalus. Chronic changes of  the brain. If there is further clinical concern, MRI could be considered  for further evaluation.     This report was finalized on 5/29/2025 4:17 PM by Dr. Enrique Milner M.D on Workstation: [id number]             Scan on 5/29/2025 1428 by [employer], [place]: ECG 12-LEAD         Author: -- Service: -- Author Type: --   Filed: Date of Service: Creation Time:   Status: (Other)   HEART RATE=67  bpm  RR Dodbvych=997  ms  ID Ylpjiveq=528  ms  P Horizontal Axis=23  deg  P Front Axis=35  deg  QRSD Interval=98  ms  QT Hulxbwcy=500  ms  GDuF=443  ms  QRS Axis=-2  deg  T Wave Axis=27  deg  - OTHERWISE NORMAL ECG -  Sinus rhythm  Abnormal R-wave progression, early transition  When compared with ECG of 21-May-2025 15:14:23,  No significant change          Current Facility-Administered Medications:     [START ON 5/31/2025] amLODIPine (NORVASC) tablet 5 mg, 5 mg, Oral, Daily, Rick Stallworth MD    aspirin chewable tablet 81 mg, 81 mg, Oral, Daily, Atilio Melendrez MD, 81 mg at 05/30/25 0833    atorvastatin (LIPITOR) tablet 10 mg, 10 mg, Oral, Nightly, Atilio Melendrez MD, 10 mg at 05/29/25 2023    clopidogrel (PLAVIX) tablet 75 mg, 75 mg, Oral, Daily, Atilio Melendrez MD, 75 mg at 05/30/25 0833    famotidine (PEPCID) tablet 20 mg, 20 mg, Oral, BID, Atilio Melendrez MD, 20 mg at 05/30/25 0833    levETIRAcetam (KEPPRA) tablet 500 mg, 500 mg, Oral, BID, Atilio Melendrez MD, 500 mg at 05/30/25 0833    sodium chloride 0.9 % infusion, 75 mL/hr, Intravenous, Continuous, Atilio Melendrez MD, Last Rate: 75 mL/hr at 05/29/25 2023, 75 mL/hr at 05/29/25 2023     ASSESSMENT  Acute kidney injury  Dizziness lightheadedness and syncopal episode secondary to volume depletion and dehydration  History of seizure disorder  Diabetes mellitus  Hypertension  Hyperlipidemia  History of CVA with dysarthria  Depression  Chronic kidney  disease stage III    PLAN  Admit  IVF  Neurochecks every 4 hours  Check orthostasis BP and pulse  Neurology consult  Nephrology to follow patient  Adjust home medications  Stress ulcer DVT prophylaxis  Supportive care  PT OT  Patient is full code  Discussed with nursing staff  Follow closely further recommendation current hospital course    JAIME PARK MD

## 2025-05-30 NOTE — CASE MANAGEMENT/SOCIAL WORK
Discharge Planning Assessment  [facility]     Patient Name: Eric Simms  MRN: [id number]  Today's Date: 5/30/2025    Admit Date: 5/29/2025    Plan: Home with family and current    Discharge Needs Assessment       Row Name 05/30/25 1417       Living Environment    People in Home child(filipe), adult;spouse    Current Living Arrangements home    Potentially Unsafe Housing Conditions none    Primary Care Provided by self    Family Caregiver if Needed child(filipe), adult;spouse    Quality of Family Relationships involved;helpful    Able to Return to Prior Arrangements yes       Resource/Environmental Concerns    Resource/Environmental Concerns none    Transportation Concerns none       Transition Planning    Patient/Family Anticipates Transition to home with family;home with help/services    Patient/Family Anticipated Services at Transition home health care    Transportation Anticipated other (see comments)       Discharge Needs Assessment    Readmission Within the Last 30 Days other (see comments)  recurrance of symptoms    Equipment Currently Used at Home rollator;shower chair    Concerns to be Addressed adjustment to diagnosis/illness    Anticipated Changes Related to Illness none    Equipment Needed After Discharge none                   Discharge Plan       Row Name 05/30/25 9045       Plan    Plan Home with family and current HH    Patient/Family in Agreement with Plan yes    Plan Comments Spoke with the pt at bedside, introduced self and explained CCP role, verified the face sheet and pharmacy information. Pt lives with wife and adult dtr in 1 level home with 3 FERNA[address]O, he needs assistance with all ADL's, he uses rollator, s/c, and cane. He is current with [facility] RN notified to manage. No SNF history, pt plans home with family and will need LYFT transport home. CCP will follow - Mary DICKEY                  Continued Care and Services - Admitted Since 5/29/2025       Home Medical Care       Service  Provider Request Status Services Address Phone Fax Patient Preferred    [facility] Accepted -- [address], [address], [facility] [address] [phone] --                  Selected Continued Care - Prior Encounters Includes continued care and service providers with selected services from prior encounters from 2/28/2025 to 5/30/2025      Discharged on 5/26/2025 Admission date: 5/21/2025 - Discharge disposition: Home or Self Care      Home Medical Care       Service Provider Services Address Phone Fax Patient Preferred    [facility] [address], [address], [facility] [id number] [phone] --                          Expected Discharge Date and Time       Expected Discharge Date Expected Discharge Time    Jun 1, 2025            Demographic Summary       Row Name 05/30/25 1416       General Information    Admission Type observation                   Functional Status       Row Name 05/30/25 1416       Functional Status    Usual Activity Tolerance good    Current Activity Tolerance good       Assessment of Health Literacy    Health Literacy Fair       Functional Status, IADL    Medications independent;assistive person    Meal Preparation independent;assistive person    Housekeeping assistive person    Laundry assistive person    Shopping assistive person    IADL Comments dtr assists       Mental Status    General Appearance WDL WDL       Mental Status Summary    Recent Changes in Mental Status/Cognitive Functioning no changes                   Psychosocial    No documentation.                  Abuse/Neglect    No documentation.                  Legal       Row Name 05/30/25 1417       Financial/Legal    Who Manages Finances if Patient Unable dtr                   Substance Abuse    No documentation.                  Patient Forms    No documentation.                     Mary Monique RN

## 2025-05-30 NOTE — THERAPY EVALUATION
Patient Name: Eric Simms  : 1960    MRN: [id number]                              Today's Date: 2025       Admit Date: 2025    Visit Dx:     ICD-10-CM ICD-9-CM   1. Syncope, unspecified syncope type  R55 780.2   2. Acute renal failure, unspecified acute renal failure type  N17.9 584.9   3. Type 2 diabetes mellitus with hyperglycemia, with long-term current use of insulin  E11.65 250.00    Z79.4 790.29     V58.67     Patient Active Problem List   Diagnosis    Chest pain, unspecified type    Type 2 diabetes mellitus with hyperglycemia    HTN (hypertension)    Obesity (BMI 30-39.9)    Anemia, chronic disease    CKD (chronic kidney disease) stage 2, GFR 60-89 ml/min    Seizure disorder    MARLON (acute kidney injury)    Acute kidney injury    Syncope     Past Medical History:   Diagnosis Date    Coronary artery disease     Diabetes mellitus     GERD (gastroesophageal reflux disease)     Hyperlipidemia     Hypertension     Peripheral neuropathy     Seizures     Stroke      History reviewed. No pertinent surgical history.   General Information       Row Name 25 1522          Physical Therapy Time and Intention    Document Type evaluation  -MG     Mode of Treatment physical therapy  -MG       Row Name 25 1522          General Information    Patient Profile Reviewed yes  -MG     Prior Level of Function independent:  Uses cane. Owns Rollator  -MG     Existing Precautions/Restrictions other (see comments)  h/o syncopal episodes  -MG     Barriers to Rehab none identified  -MG       Row Name 25 1522          Living Environment    Current Living Arrangements home  -MG     People in Home child(iflipe), adult;significant other  -MG       Row Name 25 1522          Home Main Entrance    Number of Stairs, Main Entrance three  -MG     Stair Railings, Main Entrance railings safe and in good condition  -MG       Row Name 25 1522          Stairs Within Home, Primary    Number of Stairs, Within  Home, Primary none  -MG       Row Name 05/30/25 1522          Cognition    Orientation Status (Cognition) oriented x 4  -MG       Row Name 05/30/25 1522          Safety Issues/Impairments Affecting Functional Mobility    Impairments Affecting Function (Mobility) endurance/activity tolerance;strength  -MG     Comment, Safety Issues/Impairments (Mobility) Gait belt and non-skid socks donned.  -MG               User Key  (r) = Recorded By, (t) = Taken By, (c) = Cosigned By      Initials Name Provider Type    MG Ana M Rivers, [place] Physical Therapist                   Mobility       Row Name 05/30/25 1525          Bed Mobility    Supine-Sit [place] (Bed Mobility) modified independence  -MG     Assistive Device (Bed Mobility) head of bed elevated  -MG       Row Name 05/30/25 1525          Sit-Stand Transfer    Sit-Stand [place] (Transfers) standby assist;verbal cues  -MG     Assistive Device (Sit-Stand Transfers) walker, front-wheeled  -MG     Comment, (Sit-Stand Transfer) Cues for hand placement.  -MG       Row Name 05/30/25 1525          Gait/Stairs (Locomotion)    [place] Level (Gait) contact guard;standby assist;verbal cues  -MG     Assistive Device (Gait) walker, front-wheeled  -MG     Patient was able to Ambulate yes  -MG     Distance in Feet (Gait) 150  -MG     Deviations/Abnormal Patterns (Gait) gait speed decreased  -MG     Bilateral Gait Deviations forward flexed posture;heel strike decreased  -MG     Comment, (Gait/Stairs) No dizziness, SOB, LOB or buckling. Reports feeling at his baseline mobility.  -MG               User Key  (r) = Recorded By, (t) = Taken By, (c) = Cosigned By      Initials Name Provider Type    MG Ana M Rivers, PT Physical Therapist                   Obj/Interventions       Row Name 05/30/25 1525          Range of Motion Comprehensive    General Range of Motion bilateral lower extremity ROM WFL  -MG       Row Name 05/30/25 1525          Strength Comprehensive (MMT)     General Manual Muscle Testing (MMT) Assessment lower extremity strength deficits identified  -MG     Comment, General Manual Muscle Testing (MMT) Assessment Generalized weakness. Grossly 4+/5.  -MG       Row Name 05/30/25 1525          Balance    Static Sitting Balance independent  -MG     Dynamic Sitting Balance supervision  -MG     Position, Sitting Balance sitting edge of bed  -MG     Static Standing Balance standby assist  -MG     Dynamic Standing Balance contact guard;standby assist  -MG     Position/Device Used, Standing Balance supported;walker, front-wheeled  -MG       Row Name 05/30/25 1525          Sensory Assessment (Somatosensory)    Sensory Assessment (Somatosensory) sensation intact  Neuropathy to his feet  -MG               User Key  (r) = Recorded By, (t) = Taken By, (c) = Cosigned By      Initials Name Provider Type    MG Ana M Rivers, PT Physical Therapist                   Goals/Plan       Row Name 05/30/25 1530          Transfer Goal 1 (PT)    Activity/Assistive Device (Transfer Goal 1, PT) transfers, all  -MG     [place] Level/Cues Needed (Transfer Goal 1, PT) modified independence  -MG     Time Frame (Transfer Goal 1, PT) 1 week  -MG       Row Name 05/30/25 1530          Gait Training Goal 1 (PT)    Activity/Assistive Device (Gait Training Goal 1, PT) gait (walking locomotion)  -MG     [place] Level (Gait Training Goal 1, PT) modified independence  -MG     Distance (Gait Training Goal 1, PT) 150  -MG     Time Frame (Gait Training Goal 1, PT) 1 week  -MG       Row Name 05/30/25 1530          Therapy Assessment/Plan (PT)    Planned Therapy Interventions (PT) balance training;bed mobility training;gait training;home exercise program;patient/family education;stretching;strengthening;stair training;neuromuscular re-education;ROM (range of motion);postural re-education;transfer training  -MG               User Key  (r) = Recorded By, (t) = Taken By, (c) = Cosigned By      Initials Name  Provider Type    MG Ana M Rivers, PT Physical Therapist                   Clinical Impression       Row Name 05/30/25 1526          Pain    Pretreatment Pain Rating 0/10 - no pain  -MG     Posttreatment Pain Rating 0/10 - no pain  -MG       Row Name 05/30/25 1526          Plan of Care Review    Plan of Care Reviewed With patient  -MG     Progress improving  -MG     Outcome Evaluation Pt is a 64 y/o M with h/o DM, HTN, CVA, and CAD who presented to [facility] with c/o N/V/D and syncopal episodes; workup ongoing. Pt reports he lives with his SO and step daughter in a home with 3 FERNANDO, owns a rollator, and is (I) with use of a cane. Pt is A&O x4, pleasant and agreeable to eval. Today, pt was Mod-I for bed mobility, SBA for STS to RW, and CG/SBA for ambulation of 150' w/ RW. Pts BP was taken throughout the session, see below. No dizziness, SOB, LOB or buckling noted. Pt reports feeling at or close to his mobility baseline and is anxious to return home. Reports he was going to get HH and would like to continue w/ HH services. Discussed w/ RN. PT will cont to follow to address his impaired strength and endurance. Rec home w/ HH at [place].  -MG       Row Name 05/30/25 1526          Therapy Assessment/Plan (PT)    Rehab Potential (PT) good  -MG     Criteria for Skilled Interventions Met (PT) yes  -MG     Therapy Frequency (PT) 3 times/wk  -MG       Row Name 05/30/25 1526          Vital Signs    Pre Systolic BP Rehab 139  supine  -MG     Pre Treatment Diastolic BP 71  -MG     Intra Systolic BP Rehab 145  sitting  -MG     Intra Treatment Diastolic BP 74  -MG     Post Systolic BP Rehab 151  standing after gait  -MG     Post Treatment Diastolic BP 74  -MG     Pretreatment Heart Rate (beats/min) 82  -MG     Intratreatment Heart Rate (beats/min) 84  -MG     Posttreatment Heart Rate (beats/min) 89  -MG     O2 Delivery Pre Treatment room air  -MG     O2 Delivery Intra Treatment room air  -MG     O2 Delivery Post Treatment room air  -MG      Pre Patient Position Supine  -MG     Intra Patient Position Standing  -MG     Post Patient Position Supine  -MG       Row Name 05/30/25 1526          Positioning and Restraints    Pre-Treatment Position in bed  -MG     Post Treatment Position chair  -MG     In Chair notified nsg;reclined;call light within reach;encouraged to call for assist;exit alarm on;legs elevated  -MG               User Key  (r) = Recorded By, (t) = Taken By, (c) = Cosigned By      Initials Name Provider Type    Ana M Pearson, PT Physical Therapist                   Outcome Measures       Row Name 05/30/25 1530 05/30/25 0800       How much help from another person do you currently need...    Turning from your back to your side while in flat bed without using bedrails? 4  -MG 4  -JW    Moving from lying on back to sitting on the side of a flat bed without bedrails? 4  -MG 4  -JW    Moving to and from a bed to a chair (including a wheelchair)? 3  -MG 4  -JW    Standing up from a chair using your arms (e.g., wheelchair, bedside chair)? 4  -MG 3  -JW    Climbing 3-5 steps with a railing? 3  -MG 3  -JW    To walk in hospital room? 3  -MG 3  -JW    AM-PAC 6 Clicks Score (PT) 21  -MG 21  -JW      Row Name 05/30/25 1347          Modified [place] Scale    Modified [place] Scale 3 - Moderate disability.  Requiring some help, but able to walk without assistance.  -JR       Row Name 05/30/25 1347          Functional Assessment    Outcome Measure Options AM-PAC 6 Clicks Daily Activity (OT);Modified [place]  -JR               User Key  (r) = Recorded By, (t) = Taken By, (c) = Cosigned By      Initials Name Provider Type    Ana M Pearson, PT Physical Therapist    Micaela Shepherd, RN Registered Nurse    Reese Smalls, OT Occupational Therapist                                 Physical Therapy Education       Title: PT OT SLP Therapies (In Progress)       Topic: Physical Therapy (In Progress)       Point: Mobility training (Done)       Learning Progress  Summary            Patient Acceptance, E, VU by MG at 5/30/2025 1530                      Point: Home exercise program (Not Started)       Learner Progress:  Not documented in this visit.              Point: Body mechanics (Done)       Learning Progress Summary            Patient Acceptance, E, VU by MG at 5/30/2025 1530                      Point: Precautions (Done)       Learning Progress Summary            Patient Acceptance, E, VU by MG at 5/30/2025 1530                                      User Key       Initials Effective Dates Name Provider Type Discipline     05/24/22 -  Ana M Rivers, PT Physical Therapist PT                  PT Recommendation and Plan  Planned Therapy Interventions (PT): balance training, bed mobility training, gait training, home exercise program, patient/family education, stretching, strengthening, stair training, neuromuscular re-education, ROM (range of motion), postural re-education, transfer training  Progress: improving  Outcome Evaluation: Pt is a 64 y/o M with h/o DM, HTN, CVA, and CAD who presented to [facility] with c/o N/V/D and syncopal episodes; workup ongoing. Pt reports he lives with his SO and step daughter in a home with 3 FERNANDO, owns a rollator, and is (I) with use of a cane. Pt is A&O x4, pleasant and agreeable to eval. Today, pt was Mod-I for bed mobility, SBA for STS to RW, and CG/SBA for ambulation of 150' w/ RW. Pts BP was taken throughout the session, see below. No dizziness, SOB, LOB or buckling noted. Pt reports feeling at or close to his mobility baseline and is anxious to return home. Reports he was going to get HH and would like to continue w/ HH services. Discussed w/ RN. PT will cont to follow to address his impaired strength and endurance. Rec home w/ HH at [place].     Time Calculation:         PT Charges       Row Name 05/30/25 1531             Time Calculation    Start Time 0936  -MG      Stop Time 0959  -MG      Time Calculation (min) 23 min  -MG      PT Received  On 05/30/25  -MG      PT - Next Appointment 06/02/25  -MG      PT Goal Re-Cert Due Date 06/13/25  -MG         Time Calculation- PT    Total Timed Code Minutes- PT 11 minute(s)  -MG                User Key  (r) = Recorded By, (t) = Taken By, (c) = Cosigned By      Initials Name Provider Type    MG Ana M Rivers, PT Physical Therapist                  Therapy Charges for Today       Code Description Service Date Service Provider Modifiers Qty    [id number]  PT EVAL MOD COMPLEXITY 3 5/30/2025 Ana M Rivers, PT GP 1    [id number]  PT THERAPEUTIC ACT EA 15 MIN 5/30/2025 Ana M Rivers, PT GP 1            PT G-Codes  Outcome Measure Options: AM-PAC 6 Clicks Daily Activity (OT), Modified [place]  AM-PAC 6 Clicks Score (PT): 21  AM-PAC 6 Clicks Score (OT): 20  Modified [place] Scale: 3 - Moderate disability.  Requiring some help, but able to walk without assistance.  PT Discharge Summary  Anticipated Discharge Disposition (PT): home with home health, home with assist    Ana M Rivers, PT  5/30/2025

## 2025-05-30 NOTE — PLAN OF CARE
Goal Outcome Evaluation:  Plan of Care Reviewed With: patient        Progress: improving  Outcome Evaluation: Pt is a 66 y/o M with h/o DM, HTN, CVA, and CAD who presented to [facility] with c/o N/V/D and syncopal episodes; workup ongoing. Pt reports he lives with his SO and step daughter in a home with 3 FERNANDO, owns a rollator, and is (I) with use of a cane. Pt is A&O x4, pleasant and agreeable to eval. Today, pt was Mod-I for bed mobility, SBA for STS to RW, and CG/SBA for ambulation of 150' w/ RW. Pts BP was taken throughout the session, see below. No dizziness, SOB, LOB or buckling noted. Pt reports feeling at or close to his mobility baseline and is anxious to return home. Reports he was going to get HH and would like to continue w/ HH services. Discussed w/ RN. PT will cont to follow to address his impaired strength and endurance. Rec home w/ HH at [place].    Anticipated Discharge Disposition (PT): home with home health, home with assist           Supine 139/71  Sitting 145/74  Standing after gait 151/74

## 2025-05-30 NOTE — DISCHARGE PLACEMENT REQUEST
"Eric Simms (65 y.o. Male)       Date of Birth   1960    Social Security Number       Address   [address] [place] [address]    Home Phone   [phone]    MRN   [id number]       Zoroastrian   Yazdanism    Marital Status                               Admission Date   5/29/2025    Admission Type   Emergency    Admitting Provider   Atilio Melendrez MD    Attending Provider   Atilio Melendrez MD    Department, Room/Bed   [address], [address]       Discharge Date       Discharge Disposition       Discharge Destination                                 Attending Provider: Atilio Melendrez MD    Allergies: No Known Allergies    Isolation: None   Infection: None   Code Status: CPR    Ht: 165.1 cm (65\")   Wt: 91.6 kg (202 lb)    Admission Cmt: None   Principal Problem: Syncope [R55]                   Active Insurance as of 5/29/2025       Primary Coverage       Payor Plan Insurance Group Employer/Plan Group    UNITED HEALTHC[employer] MEDICARE REPLACEMENT UHC MEDICARE ADVANTAGE SNP PPO [id number]       Payor Plan Address Payor Plan Phone Number Payor Plan Fax Number Effective Dates    [address] [address]   4/1/2025 - None Entered    [facility] [id number]         Subscriber Name Subscriber Birth Date Member ID       ERIC SIMMS 1960 [id number]               Secondary Coverage       Payor Plan Insurance Group Employer/Plan Group    [employer] [place] WELLCARE MEDICAID [id number]       Payor Plan Address Payor Plan Phone Number Payor Plan Fax Number Effective Dates    [address] [address] [phone]  2/22/2023 - None Entered    [facility] [address]         Subscriber Name Subscriber Birth Date Member ID       ERIC SIMMS 1960 [id number]                     Emergency Contacts        (Rel.) Home Phone Work Phone Mobile Phone    DALIA MACHADO (Significant Other) [phone] -- [phone]    Bonnie Pozo (Daughter) -- -- [phone]                "

## 2025-05-30 NOTE — CONSULTS
"Diabetes Education  Assessment/Teaching    Patient Name:  Eric Simms  YOB: 1960  MRN: [id number]  Admit Date:  5/29/2025      Assessment Date:  5/30/2025  Flowsheet Row Most Recent Value   General Information     Referral From: Other -order set. Meet with 64 y/o at bedside on 6N to assess needs for DM ed/DM supplies for dc.    Height 165.1 cm (65\")   Weight 91.6 kg (202 lb)   Weight Method Stated   Diabetes History    What type of diabetes do you have? Type 2   Current DM knowledge -- -pt reports + family hx of DM.   Do you test your blood sugar at home? no   Have you had high blood sugar? (>140mg/dl) yes   What makes it difficult for you to take care of your diabetes or yourself? -pt endorses recent losses- of family members.   Do you have any diabetes complications? heart disease, neuropathy   Education Preferences    Barriers to Learning -- motivation to make changes r/t self care (E.g. restarting home BG checks)   Assessment Topics    Taking Medication - Assessment ---Pt describes being rx'd long acting insulin outpt. He does not appear to remember the names of home insulin(s.)   Reducing Risk - Assessment Needs education   Healthy Coping - Assessment Needs education   Monitoring - Assessment Needs education   DM Goals    Contact Plan Follow-up medical care            Flowsheet Row Most Recent Value   DM Education Needs    Meter Has own   Frequency of Testing -- -discuss w/pt re-starting BG checks at dc.   Medication Insulin -Lantus insulin name.   Reducing Risks Foot care, Neuropathy -emphasize to pt the importance of foot exams ellie in the context of having neuropathy.   Healthy Coping Appropriate -discuss finding support and talking to s.o. for support w/DM mgt.   Discharge Plan Follow-up with MD   Teaching Method Explanation, Discussion   Patient Response Needs reinforcement        Other Comments:    Electronically signed by:  Opal Pearson RN, BSN, [facility]  05/30/25 14:11 EDT  "

## 2025-05-30 NOTE — OUTREACH NOTE
Medical Week 1 Survey      Flowsheet Row Responses   [facility] patient discharged from? [place]   Does the patient have one of the following disease processes/diagnoses(primary or secondary)? Other   Week 1 attempt successful? No   Unsuccessful attempts Attempt 1   Revoke Readmitted            SALOMÓN GONZALEZ - Registered Nurse

## 2025-05-31 LAB
ALBUMIN SERPL-MCNC: 3.4 G/DL (ref 3.5–5.2)
ANION GAP SERPL CALCULATED.3IONS-SCNC: 10 MMOL/L (ref 5–15)
BASOPHILS # BLD AUTO: 0.05 10*3/MM3 (ref 0–0.2)
BASOPHILS NFR BLD AUTO: 0.7 % (ref 0–1.5)
BUN SERPL-MCNC: 13 MG/DL (ref 8–23)
BUN/CREAT SERPL: 9.4 (ref 7–25)
CALCIUM SPEC-SCNC: 8.7 MG/DL (ref 8.6–10.5)
CHLORIDE SERPL-SCNC: 107 MMOL/L (ref 98–107)
CO2 SERPL-SCNC: 21 MMOL/L (ref 22–29)
CREAT SERPL-MCNC: 1.39 MG/DL (ref 0.76–1.27)
DEPRECATED RDW RBC AUTO: 45.6 FL (ref 37–54)
EGFRCR SERPLBLD CKD-EPI 2021: 56.3 ML/MIN/1.73
EOSINOPHIL # BLD AUTO: 0.19 10*3/MM3 (ref 0–0.4)
EOSINOPHIL NFR BLD AUTO: 2.6 % (ref 0.3–6.2)
ERYTHROCYTE [DISTWIDTH] IN BLOOD BY AUTOMATED COUNT: 13.4 % (ref 12.3–15.4)
GLUCOSE BLDC GLUCOMTR-MCNC: 145 MG/DL (ref 70–130)
GLUCOSE BLDC GLUCOMTR-MCNC: 250 MG/DL (ref 70–130)
GLUCOSE BLDC GLUCOMTR-MCNC: 264 MG/DL (ref 70–130)
GLUCOSE BLDC GLUCOMTR-MCNC: 265 MG/DL (ref 70–130)
GLUCOSE SERPL-MCNC: 144 MG/DL (ref 65–99)
HCT VFR BLD AUTO: 28 % (ref 37.5–51)
HGB BLD-MCNC: 9.5 G/DL (ref 13–17.7)
IMM GRANULOCYTES # BLD AUTO: 0.09 10*3/MM3 (ref 0–0.05)
IMM GRANULOCYTES NFR BLD AUTO: 1.2 % (ref 0–0.5)
LYMPHOCYTES # BLD AUTO: 1.69 10*3/MM3 (ref 0.7–3.1)
LYMPHOCYTES NFR BLD AUTO: 23.4 % (ref 19.6–45.3)
MCH RBC QN AUTO: 32 PG (ref 26.6–33)
MCHC RBC AUTO-ENTMCNC: 33.9 G/DL (ref 31.5–35.7)
MCV RBC AUTO: 94.3 FL (ref 79–97)
MONOCYTES # BLD AUTO: 0.75 10*3/MM3 (ref 0.1–0.9)
MONOCYTES NFR BLD AUTO: 10.4 % (ref 5–12)
NEUTROPHILS NFR BLD AUTO: 4.44 10*3/MM3 (ref 1.7–7)
NEUTROPHILS NFR BLD AUTO: 61.7 % (ref 42.7–76)
NRBC BLD AUTO-RTO: 0 /100 WBC (ref 0–0.2)
PHOSPHATE SERPL-MCNC: 2.7 MG/DL (ref 2.5–4.5)
PLATELET # BLD AUTO: 208 10*3/MM3 (ref 140–450)
PMV BLD AUTO: 11.1 FL (ref 6–12)
POTASSIUM SERPL-SCNC: 4.8 MMOL/L (ref 3.5–5.2)
RBC # BLD AUTO: 2.97 10*6/MM3 (ref 4.14–5.8)
SODIUM SERPL-SCNC: 138 MMOL/L (ref 136–145)
WBC NRBC COR # BLD AUTO: 7.21 10*3/MM3 (ref 3.4–10.8)

## 2025-05-31 PROCEDURE — 85025 COMPLETE CBC W/AUTO DIFF WBC: CPT | Performed by: HOSPITALIST

## 2025-05-31 PROCEDURE — 80069 RENAL FUNCTION PANEL: CPT | Performed by: INTERNAL MEDICINE

## 2025-05-31 PROCEDURE — 82948 REAGENT STRIP/BLOOD GLUCOSE: CPT

## 2025-05-31 PROCEDURE — 99221 1ST HOSP IP/OBS SF/LOW 40: CPT | Performed by: PSYCHIATRY & NEUROLOGY

## 2025-05-31 PROCEDURE — 63710000001 INSULIN LISPRO (HUMAN) PER 5 UNITS: Performed by: HOSPITALIST

## 2025-05-31 RX ORDER — LEVETIRACETAM 500 MG/1
250 TABLET ORAL 2 TIMES DAILY
Status: COMPLETED | OUTPATIENT
Start: 2025-05-31 | End: 2025-06-01

## 2025-05-31 RX ORDER — AMLODIPINE BESYLATE 10 MG/1
10 TABLET ORAL DAILY
Status: DISCONTINUED | OUTPATIENT
Start: 2025-06-01 | End: 2025-06-02 | Stop reason: HOSPADM

## 2025-05-31 RX ADMIN — FAMOTIDINE 20 MG: 20 TABLET, FILM COATED ORAL at 09:01

## 2025-05-31 RX ADMIN — LEVETIRACETAM 500 MG: 500 TABLET, FILM COATED ORAL at 09:01

## 2025-05-31 RX ADMIN — LEVETIRACETAM 250 MG: 500 TABLET, FILM COATED ORAL at 20:40

## 2025-05-31 RX ADMIN — FAMOTIDINE 20 MG: 20 TABLET, FILM COATED ORAL at 20:40

## 2025-05-31 RX ADMIN — AMLODIPINE BESYLATE 5 MG: 5 TABLET ORAL at 09:01

## 2025-05-31 RX ADMIN — INSULIN LISPRO 4 UNITS: 100 INJECTION, SOLUTION INTRAVENOUS; SUBCUTANEOUS at 11:44

## 2025-05-31 RX ADMIN — INSULIN LISPRO 4 UNITS: 100 INJECTION, SOLUTION INTRAVENOUS; SUBCUTANEOUS at 21:26

## 2025-05-31 RX ADMIN — CLOPIDOGREL BISULFATE 75 MG: 75 TABLET, FILM COATED ORAL at 09:01

## 2025-05-31 RX ADMIN — ASPIRIN 81 MG CHEWABLE TABLET 81 MG: 81 TABLET CHEWABLE at 09:01

## 2025-05-31 RX ADMIN — SODIUM CHLORIDE 75 ML/HR: 9 INJECTION, SOLUTION INTRAVENOUS at 00:37

## 2025-05-31 RX ADMIN — INSULIN LISPRO 4 UNITS: 100 INJECTION, SOLUTION INTRAVENOUS; SUBCUTANEOUS at 17:03

## 2025-05-31 RX ADMIN — ATORVASTATIN CALCIUM 10 MG: 10 TABLET ORAL at 20:40

## 2025-05-31 NOTE — PROGRESS NOTES
"Daily progress note    Primary care physician      Subjective  Awake and alert and feeling better than yesterday with no new complaints    History of present illness  65-year-old male with history of diabetes hypertension hyperlipidemia seizure disorder CVA with dysarthria depression and chronic kidney disease stage III who was recently discharged from the hospital after management of acute kidney injury presented to [facility] with generalized weakness and syncopal episode.  Patient has nausea vomiting diarrhea for last several days and felt lightheaded.  Patient denies any headache vision problem speech problem or focal weakness.  Patient workup in ER revealed again acute kidney injury admitted for management.     REVIEW OF SYSTEMS  All systems reviewed and negative except for those discussed in HPI.     PHYSICAL EXAM   Blood pressure 161/95, pulse 76, temperature 98.1 °F (36.7 °C), temperature source Oral, resp. rate 18, height 165.1 cm (65\"), weight 91.6 kg (202 lb), SpO2 100%.    GENERAL: Alert well-appearing male in no obvious distress.    SKIN: Warm, dry.    HENT: Normocephalic, atraumatic  EYES: no scleral icterus  CV: regular rhythm, regular rate-no murmur  RESPIRATORY: normal effort, moving air bilaterally  ABDOMEN: soft, nontender, nondistended bowel sounds positive  MUSCULOSKELETAL: Spine-no significant bony tenderness to palpation, good range of motion.  NEURO: alert, moves all extremities, follows commands     LAB RESULTS  Lab Results (last 24 hours)       Procedure Component Value Units Date/Time    POC Glucose Once [[id number]]  (Abnormal) Collected: 05/31/25 1115    Specimen: Blood Updated: 05/31/25 1117     Glucose 250 mg/dL     Renal Function Panel [[id number]]  (Abnormal) Collected: 05/31/25 0555    Specimen: Blood Updated: 05/31/25 0645     Glucose 144 mg/dL      BUN 13.0 mg/dL      Creatinine 1.39 mg/dL      Sodium 138 mmol/L      Potassium 4.8 mmol/L      Chloride 107 mmol/L  "     CO2 21.0 mmol/L      Calcium 8.7 mg/dL      Albumin 3.4 g/dL      Phosphorus 2.7 mg/dL      Anion Gap 10.0 mmol/L      BUN/Creatinine Ratio 9.4     eGFR 56.3 mL/min/1.73     Narrative:      GFR Categories in Chronic Kidney Disease (CKD)              GFR Category          GFR (mL/min/1.73)    Interpretation  G1                    90 or greater        Normal or high (1)  G2                    60-89                Mild decrease (1)  G3a                   45-59                Mild to moderate decrease  G3b                   30-44                Moderate to severe decrease  G4                    15-29                Severe decrease  G5                    14 or less           Kidney failure    (1)In the absence of evidence of kidney disease, neither GFR category G1 or G2 fulfill the criteria for CKD.    eGFR calculation 2021 CKD-EPI creatinine equation, which does not include race as a factor    CBC & Differential [[id number]]  (Abnormal) Collected: 05/31/25 0555    Specimen: Blood Updated: 05/31/25 0626    Narrative:      The following orders were created for panel order CBC & Differential.  Procedure                               Abnormality         Status                     ---------                               -----------         ------                     CBC Auto Differential[[id number]]        Abnormal            Final result                 Please view results for these tests on the individual orders.    CBC Auto Differential [[id number]]  (Abnormal) Collected: 05/31/25 0555    Specimen: Blood Updated: 05/31/25 0626     WBC 7.21 10*3/mm3      RBC 2.97 10*6/mm3      Hemoglobin 9.5 g/dL      Hematocrit 28.0 %      MCV 94.3 fL      MCH 32.0 pg      MCHC 33.9 g/dL      RDW 13.4 %      RDW-SD 45.6 fl      MPV 11.1 fL      Platelets 208 10*3/mm3      Neutrophil % 61.7 %      Lymphocyte % 23.4 %      Monocyte % 10.4 %      Eosinophil % 2.6 %      Basophil % 0.7 %      Immature Grans % 1.2 %      Neutrophils, Absolute  4.44 10*3/mm3      Lymphocytes, Absolute 1.69 10*3/mm3      Monocytes, Absolute 0.75 10*3/mm3      Eosinophils, Absolute 0.19 10*3/mm3      Basophils, Absolute 0.05 10*3/mm3      Immature Grans, Absolute 0.09 10*3/mm3      nRBC 0.0 /100 WBC     POC Glucose Once [[id number]]  (Abnormal) Collected: 05/31/25 0554    Specimen: Blood Updated: 05/31/25 0555     Glucose 145 mg/dL     Sodium, Urine, Random - Urine, Clean Catch [[id number]] Collected: 05/30/25 2309    Specimen: Urine, Clean Catch Updated: 05/30/25 2358     Sodium, Urine 120 mmol/L     Narrative:      Reference intervals for random urine have not been established.  Clinical usage is dependent upon physician's interpretation in combination with other laboratory tests.       POC Glucose Once [[id number]]  (Abnormal) Collected: 05/30/25 2104    Specimen: Blood Updated: 05/30/25 2107     Glucose 173 mg/dL     POC Glucose Once [[id number]]  (Abnormal) Collected: 05/30/25 1705    Specimen: Blood Updated: 05/30/25 1707     Glucose 202 mg/dL           Imaging Results (Last 24 Hours)       ** No results found for the last 24 hours. **          Scan on 5/29/2025 1428 by [employer], [place]: ECG 12-LEAD         Author: -- Service: -- Author Type: --   Filed: Date of Service: Creation Time:   Status: (Other)   HEART RATE=67  bpm  RR Pnqqhsij=451  ms  WI Xjaykkre=878  ms  P Horizontal Axis=23  deg  P Front Axis=35  deg  QRSD Interval=98  ms  QT Dsrbimin=433  ms  DLqI=198  ms  QRS Axis=-2  deg  T Wave Axis=27  deg  - OTHERWISE NORMAL ECG -  Sinus rhythm  Abnormal R-wave progression, early transition  When compared with ECG of 21-May-2025 15:14:23,  No significant change          Current Facility-Administered Medications:     amLODIPine (NORVASC) tablet 5 mg, 5 mg, Oral, Daily, Rick Stallworth MD, 5 mg at 05/31/25 0901    aspirin chewable tablet 81 mg, 81 mg, Oral, Daily, Atilio Melendrez MD, 81 mg at 05/31/25 0901    atorvastatin (LIPITOR) tablet 10 mg, 10 mg, Oral,  Nightly, Jaime Melendrez MD, 10 mg at 05/30/25 2032    clopidogrel (PLAVIX) tablet 75 mg, 75 mg, Oral, Daily, Jaime Melendrez MD, 75 mg at 05/31/25 0901    dextrose (D50W) (25 g/50 mL) IV injection 25 g, 25 g, Intravenous, Q15 Min PRN, Jaime Melendrez MD    dextrose (GLUTOSE) oral gel 15 g, 15 g, Oral, Q15 Min PRN, Jaime Melendrez MD    famotidine (PEPCID) tablet 20 mg, 20 mg, Oral, BID, Jaime Melendrez MD, 20 mg at 05/31/25 0901    glucagon (GLUCAGEN) injection 1 mg, 1 mg, Intramuscular, Q15 Min PRN, Jaime Melendrez MD    insulin lispro (HUMALOG/ADMELOG) injection 2-7 Units, 2-7 Units, Subcutaneous, 4x Daily AC & at Bedtime, Jaime Melendrez MD, 4 Units at 05/31/25 1144    levETIRAcetam (KEPPRA) tablet 500 mg, 500 mg, Oral, BID, Jaime Melendrez MD, 500 mg at 05/31/25 0901     ASSESSMENT  Acute kidney injury resolving  Dizziness lightheadedness and syncopal episode secondary to volume depletion and dehydration  History of seizure disorder  Diabetes mellitus  Hypertension  Hyperlipidemia  History of CVA with dysarthria  Depression  Chronic kidney disease stage III    PLAN  CPM  Discontinue IVF and observe  Neurochecks every 4 hours  Neurology consult pending  Nephrology to follow patient  Adjust home medications  Stress ulcer DVT prophylaxis  Supportive care  PT OT  Discussed with nursing staff  Follow closely further recommendation current hospital course    JAIME MELENDREZ MD    Copied text in this note has been reviewed and is accurate as of 05/31/25

## 2025-05-31 NOTE — PROGRESS NOTES
"   LOS: 0 days     Chief Complaint/ Reason for encounter: MARLON/CKD    Subjective   05/31/25 : Patient is doing well today with no new complaints  Good appetite with no nausea or vomiting  No shortness of breath chest pain or edema  Voiding well with no dysuria        Medical history reviewed:  History of Present Illness    Subjective    History taken from: Chart and patient/family as able    Vital Signs  Temp:  [98.2 °F (36.8 °C)-98.8 °F (37.1 °C)] 98.4 °F (36.9 °C)  Heart Rate:  [75-91] 75  Resp:  [16-18] 18  BP: (120-140)/(64-81) 139/71       Wt Readings from Last 1 Encounters:   05/29/25 1713 91.6 kg (202 lb)       Objective:  Vital signs: (most recent): Blood pressure 139/71, pulse 75, temperature 98.4 °F (36.9 °C), temperature source Oral, resp. rate 18, height 165.1 cm (65\"), weight 91.6 kg (202 lb), SpO2 100%.                Objective:  General Appearance:  Comfortable, well-appearing, no acute distress   HEENT: Mucous membranes moist, atraumatic  Lungs:  Normal effort and normal respiratory rate.  Breath sounds clear to auscultation: No rhonchi/Rales.  No  respiratory distress.   Heart:  S1, S2 normal.   Abdomen: Abdomen is soft, nontender/nondistended  Extremities: No edema of bilateral lower extremities  Skin:  Warm and dry       Results Review:    Intake/Output:     Intake/Output Summary (Last 24 hours) at 5/31/2025 1125  Last data filed at 5/31/2025 0048  Gross per 24 hour   Intake --   Output 550 ml   Net -550 ml         DATA:  Radiology and Labs:  The following labs independently reviewed by me. Additional labs ordered for tomorrow a.m.  Interval notes, chart personally reviewed by me.   Old records independently reviewed showing CKD stage III  The following radiologic studies independently viewed by me, findings recent renal ultrasound showed no acute process  New problems include MARLON on CKD  Discussed with patient himself at bedside    Risk/ complexity of medical care/ medical decision making high " complexity, MARLON management    Labs:   Recent Results (from the past 24 hours)   POC Glucose Once    Collection Time: 05/30/25  2:15 PM    Specimen: Blood   Result Value Ref Range    Glucose 150 (H) 70 - 130 mg/dL   POC Glucose Once    Collection Time: 05/30/25  5:05 PM    Specimen: Blood   Result Value Ref Range    Glucose 202 (H) 70 - 130 mg/dL   POC Glucose Once    Collection Time: 05/30/25  9:04 PM    Specimen: Blood   Result Value Ref Range    Glucose 173 (H) 70 - 130 mg/dL   Sodium, Urine, Random - Urine, Clean Catch    Collection Time: 05/30/25 11:09 PM    Specimen: Urine, Clean Catch   Result Value Ref Range    Sodium, Urine 120 mmol/L   POC Glucose Once    Collection Time: 05/31/25  5:54 AM    Specimen: Blood   Result Value Ref Range    Glucose 145 (H) 70 - 130 mg/dL   Renal Function Panel    Collection Time: 05/31/25  5:55 AM    Specimen: Blood   Result Value Ref Range    Glucose 144 (H) 65 - 99 mg/dL    BUN 13.0 8.0 - 23.0 mg/dL    Creatinine 1.39 (H) 0.76 - 1.27 mg/dL    Sodium 138 136 - 145 mmol/L    Potassium 4.8 3.5 - 5.2 mmol/L    Chloride 107 98 - 107 mmol/L    CO2 21.0 (L) 22.0 - 29.0 mmol/L    Calcium 8.7 8.6 - 10.5 mg/dL    Albumin 3.4 (L) 3.5 - 5.2 g/dL    Phosphorus 2.7 2.5 - 4.5 mg/dL    Anion Gap 10.0 5.0 - 15.0 mmol/L    BUN/Creatinine Ratio 9.4 7.0 - 25.0    eGFR 56.3 (L) >60.0 mL/min/1.73   CBC Auto Differential    Collection Time: 05/31/25  5:55 AM    Specimen: Blood   Result Value Ref Range    WBC 7.21 3.40 - 10.80 10*3/mm3    RBC 2.97 (L) 4.14 - 5.80 10*6/mm3    Hemoglobin 9.5 (L) 13.0 - 17.7 g/dL    Hematocrit 28.0 (L) 37.5 - 51.0 %    MCV 94.3 79.0 - 97.0 fL    MCH 32.0 26.6 - 33.0 pg    MCHC 33.9 31.5 - 35.7 g/dL    RDW 13.4 12.3 - 15.4 %    RDW-SD 45.6 37.0 - 54.0 fl    MPV 11.1 6.0 - 12.0 fL    Platelets 208 140 - 450 10*3/mm3    Neutrophil % 61.7 42.7 - 76.0 %    Lymphocyte % 23.4 19.6 - 45.3 %    Monocyte % 10.4 5.0 - 12.0 %    Eosinophil % 2.6 0.3 - 6.2 %    Basophil % 0.7 0.0 -  1.5 %    Immature Grans % 1.2 (H) 0.0 - 0.5 %    Neutrophils, Absolute 4.44 1.70 - 7.00 10*3/mm3    Lymphocytes, Absolute 1.69 0.70 - 3.10 10*3/mm3    Monocytes, Absolute 0.75 0.10 - 0.90 10*3/mm3    Eosinophils, Absolute 0.19 0.00 - 0.40 10*3/mm3    Basophils, Absolute 0.05 0.00 - 0.20 10*3/mm3    Immature Grans, Absolute 0.09 (H) 0.00 - 0.05 10*3/mm3    nRBC 0.0 0.0 - 0.2 /100 WBC   POC Glucose Once    Collection Time: 05/31/25 11:15 AM    Specimen: Blood   Result Value Ref Range    Glucose 250 (H) 70 - 130 mg/dL       Radiology:  Pertinent radiology studies were reviewed as described above      Medications have been reviewed separately in chart review medication tab      ASSESSMENT:  MARLON, prerenal and improved  CKD stage IIIa, recent baseline creatinine around 1.6  Hypotension, improved with fluids  Chronic hypertension  Diabetes mellitus type 2, poorly controlled, A1c 13  Anemia of CKD  Hyponatremia, improved  Syncope  Metabolic acidosis        DISCUSSION/PLAN:   His renal function continues to improve.  Creatinine is 1.3 today which is below his usual baseline  Stop IV fluids  Encourage oral fluid intake  BP stable on current regimen  Continue amlodipine 5 mg for BP control    Monitor electrolytes and replace as needed  Encourage oral fluid intake, nutrition and hydration    Stable for discharge home from renal standpoint anytime with outpatient follow-up      Rick Stallworth MD  [facility]   Office phone number: [phone]  Answering service phone number: [phone]    05/31/25  11:25 EDT    Dictation performed using Dragon dictation software

## 2025-05-31 NOTE — PLAN OF CARE
Problem: Adult Inpatient Plan of Care  Goal: Plan of Care Review  Flowsheets (Taken 5/31/2025 0626)  Outcome Evaluation: No acute changes. VSS. RA O2 when awake. O2 in use @ 2L via NC when sleeping. Makes needs known. No c/o pain or discomfort. Assist x1 to restroom. Call light within reach.  Plan of Care Reviewed With: patient  Goal: Absence of Hospital-Acquired Illness or Injury  Intervention: Identify and Manage Fall Risk  Flowsheets  Taken 5/31/2025 0624  Safety Promotion/Fall Prevention:   activity supervised   assistive device/personal items within reach   clutter free environment maintained   fall prevention program maintained   lighting adjusted   nonskid shoes/slippers when out of bed   room organization consistent   safety round/check completed  Taken 5/31/2025 0406  Safety Promotion/Fall Prevention:   activity supervised   assistive device/personal items within reach   clutter free environment maintained   fall prevention program maintained   lighting adjusted   nonskid shoes/slippers when out of bed   room organization consistent   safety round/check completed  Taken 5/31/2025 0207  Safety Promotion/Fall Prevention:   activity supervised   assistive device/personal items within reach   clutter free environment maintained   fall prevention program maintained   lighting adjusted   nonskid shoes/slippers when out of bed   room organization consistent   safety round/check completed  Taken 5/31/2025 0048  Safety Promotion/Fall Prevention:   activity supervised   assistive device/personal items within reach   clutter free environment maintained   fall prevention program maintained   lighting adjusted   nonskid shoes/slippers when out of bed   room organization consistent   safety round/check completed  Taken 5/30/2025 2220  Safety Promotion/Fall Prevention:   activity supervised   assistive device/personal items within reach   clutter free environment maintained   fall prevention program maintained    lighting adjusted   nonskid shoes/slippers when out of bed   room organization consistent   safety round/check completed  Taken 5/30/2025 2042  Safety Promotion/Fall Prevention:   activity supervised   assistive device/personal items within reach   clutter free environment maintained   fall prevention program maintained   lighting adjusted   nonskid shoes/slippers when out of bed   room organization consistent   safety round/check completed  Intervention: Prevent Skin Injury  Flowsheets  Taken 5/31/2025 0624 by Maksim Nielsen RN  Body Position: position changed independently  Taken 5/31/2025 0406 by Maksim Nielsen RN  Body Position: position changed independently  Taken 5/31/2025 0207 by Maksim Nielsen RN  Body Position: position changed independently  Taken 5/31/2025 0048 by Maksim Nielsen RN  Body Position: position changed independently  Taken 5/30/2025 2220 by Maksim Nielsen RN  Body Position: position changed independently  Taken 5/30/2025 2042 by Maksim Nielsen RN  Body Position: position changed independently  Taken 5/30/2025 0800 by Micaela Orellana RN  Skin Protection:   drying agents applied   transparent dressing maintained  Intervention: Prevent Infection  Flowsheets (Taken 5/31/2025 0519 by Amina Garcia, PCT)  Infection Prevention:   environmental surveillance performed   hand hygiene promoted   rest/sleep promoted   single patient room provided  Goal: Optimal Comfort and Wellbeing  Intervention: Provide Person-Centered Care  Flowsheets (Taken 5/30/2025 2042)  Trust Relationship/Rapport:   care explained   choices provided   emotional support provided   empathic listening provided   questions answered   questions encouraged   reassurance provided   thoughts/feelings acknowledged  Goal: Readiness for Transition of Care  Intervention: Mutually Develop Transition Plan  Flowsheets (Taken 5/30/2025 1417 by Mary Monique, RN)  Equipment Needed After Discharge: none  Equipment Currently Used at Home:   rollator   shower  chair  Anticipated Changes Related to Illness: none  Transportation Anticipated: other (see comments)  Transportation Concerns: none  Concerns to be Addressed: adjustment to diagnosis/illness  Readmission Within the Last 30 Days: (recurrance of symptoms) other (see comments)  Patient/Family Anticipated Services at Transition: home health care  Patient/Family Anticipates Transition to:   home with family   home with help/services     Problem: Fall Injury Risk  Goal: Absence of Fall and Fall-Related Injury  Intervention: Identify and Manage Contributors  Flowsheets  Taken 5/30/2025 2042 by Maksim Nielsen RN  Medication Review/Management:   medications reviewed   high-risk medications identified  Taken 5/30/2025 0800 by Micaela Orellana, RN  Self-Care Promotion: independence encouraged  Intervention: Promote Injury-Free Environment  Flowsheets  Taken 5/31/2025 0624  Safety Promotion/Fall Prevention:   activity supervised   assistive device/personal items within reach   clutter free environment maintained   fall prevention program maintained   lighting adjusted   nonskid shoes/slippers when out of bed   room organization consistent   safety round/check completed  Taken 5/31/2025 0406  Safety Promotion/Fall Prevention:   activity supervised   assistive device/personal items within reach   clutter free environment maintained   fall prevention program maintained   lighting adjusted   nonskid shoes/slippers when out of bed   room organization consistent   safety round/check completed  Taken 5/31/2025 0207  Safety Promotion/Fall Prevention:   activity supervised   assistive device/personal items within reach   clutter free environment maintained   fall prevention program maintained   lighting adjusted   nonskid shoes/slippers when out of bed   room organization consistent   safety round/check completed  Taken 5/31/2025 0048  Safety Promotion/Fall Prevention:   activity supervised   assistive device/personal items within reach    clutter free environment maintained   fall prevention program maintained   lighting adjusted   nonskid shoes/slippers when out of bed   room organization consistent   safety round/check completed  Taken 5/30/2025 2220  Safety Promotion/Fall Prevention:   activity supervised   assistive device/personal items within reach   clutter free environment maintained   fall prevention program maintained   lighting adjusted   nonskid shoes/slippers when out of bed   room organization consistent   safety round/check completed  Taken 5/30/2025 2042  Safety Promotion/Fall Prevention:   activity supervised   assistive device/personal items within reach   clutter free environment maintained   fall prevention program maintained   lighting adjusted   nonskid shoes/slippers when out of bed   room organization consistent   safety round/check completed   Goal Outcome Evaluation:  Plan of Care Reviewed With: patient           Outcome Evaluation: No acute changes. VSS. RA O2 when awake. O2 in use @ 2L via NC when sleeping. Makes needs known. No c/o pain or discomfort. Assist x1 to restroom. Call light within reach.

## 2025-05-31 NOTE — CONSULTS
Neurology Consult Note    Referring Provider: Dr. Melendrez  Reason for Consultation: dizziness    History of present illness:    The patient is a 65 year old man admitted 5/29/2025 with nausea, diarrhea and emesis. He complained of lightheadedness. He was found to have MARLON on CKD and admitted.     He has reported singe seizure in the past due to medication.  He has history of left thalamic hemorrhage in 7/2024, along with 2 small acute infarctions, treated at [facility]. Last neurology consult at [facility] was 5/16/2025 for stroke like symptoms (confusion). Brain was negative.  Symptoms were thought due to hyperglycemia and HTN.     Today he reports that he ho further dizziness.      Past Medical History  Past Medical History:   Diagnosis Date    Coronary artery disease     Diabetes mellitus     GERD (gastroesophageal reflux disease)     Hyperlipidemia     Hypertension     Peripheral neuropathy     Seizures     Stroke      Past Surgical History  History reviewed. No pertinent surgical history.    Family History  History reviewed. No pertinent family history.    No Known Allergies    Social History  Social History     Socioeconomic History    Marital status:    Tobacco Use    Smoking status: Never    Smokeless tobacco: Never   Vaping Use    Vaping status: Never Used   Substance and Sexual Activity    Alcohol use: Not Currently    Drug use: Not Currently     Comment: crack - last use 15 years ago    Sexual activity: Defer       Review of Systems   Neurological:  Positive for speech difficulty.        Stutter   All other systems reviewed and are negative.      Medications  Scheduled Meds:  amLODIPine, 5 mg, Oral, Daily  aspirin, 81 mg, Oral, Daily  atorvastatin, 10 mg, Oral, Nightly  clopidogrel, 75 mg, Oral, Daily  famotidine, 20 mg, Oral, BID  insulin lispro, 2-7 Units, Subcutaneous, 4x Daily AC & at Bedtime  levETIRAcetam, 500 mg, Oral, BID      Vital Signs   Temp:  [98.1 °F (36.7 °C)-98.8 °F (37.1 °C)] 98.1 °F (36.7  °C)  Heart Rate:  [75-91] 76  Resp:  [18] 18  BP: (120-161)/(65-95) 161/95    Orthostatic performed 5/30/2025  Supine  126/64  87  Standing 120/67  91    Examination:  Constitutional: Well-groomed, well-nourished  HENT:  normal  Eyes: Normal conjunctivae  CVS:  Regular rate and rhythm.  No murmurs.  Good peripheral perfusion.   Resp :   Nonlabored respirations  Musculoskeletal:  No signs of peripheral edema, normal range, no deformities  Skin:  No rash, normal turgor  Neurologic:    Alert oriented and fluent with intermittent stutter  No dysarthria  EOMF without nystagmus  Pupils symmetric and equally reactive  Face symmetric  Normal power in all extremities proximally and distally  Normal coordination  Reflexes symmetric and not hyperactive  Plantar responses flexor  Gait not tested  Psychiatric: No anxiety, normal mood    Results Review:  Results from last 7 days   Lab Units 05/31/25  0555 05/30/25  0609 05/29/25  1517   WBC 10*3/mm3 7.21 8.77 10.63   HEMOGLOBIN g/dL 9.5* 10.4* 10.4*   HEMATOCRIT % 28.0* 31.3* 31.2*   PLATELETS 10*3/mm3 208 209 207        Results from last 7 days   Lab Units 05/31/25  0555 05/30/25  0609 05/29/25  1517   SODIUM mmol/L 138 140 133*   POTASSIUM mmol/L 4.8 4.4 4.9   CHLORIDE mmol/L 107 109* 103   CO2 mmol/L 21.0* 21.0* 17.0*   BUN mg/dL 13.0 21.0 26.0*   CREATININE mg/dL 1.39* 1.62* 2.54*   CALCIUM mg/dL 8.7 9.0 8.9   BILIRUBIN mg/dL  --   --  0.3   ALK PHOS U/L  --   --  51   ALT (SGPT) U/L  --   --  26   AST (SGOT) U/L  --   --  22   GLUCOSE mg/dL 144* 75 71     Lab Results   Component Value Date    TSH 1.340 05/23/2025     Radiology  Head CT showed mild chronic vascular changes and no acute changes     Images reviewed independently      Medical Decision Making and Recommendations  Lightheadedness  Resolved  Likely secondary to hypotension  No additional neurological work up indicated    History of single seizure  Remote with no recurrence  Taper off Keppra, order  placed    Neurology signing off, please call if needed further.     I discussed these findings and my recommendations with patient    Lily Kline MD  05/31/25  14:17 EDT

## 2025-06-01 LAB
ALBUMIN SERPL-MCNC: 3.7 G/DL (ref 3.5–5.2)
ANION GAP SERPL CALCULATED.3IONS-SCNC: 10.3 MMOL/L (ref 5–15)
BASOPHILS # BLD AUTO: 0.06 10*3/MM3 (ref 0–0.2)
BASOPHILS NFR BLD AUTO: 0.8 % (ref 0–1.5)
BUN SERPL-MCNC: 13 MG/DL (ref 8–23)
BUN/CREAT SERPL: 8.8 (ref 7–25)
CALCIUM SPEC-SCNC: 9.3 MG/DL (ref 8.6–10.5)
CHLORIDE SERPL-SCNC: 103 MMOL/L (ref 98–107)
CO2 SERPL-SCNC: 23.7 MMOL/L (ref 22–29)
CREAT SERPL-MCNC: 1.47 MG/DL (ref 0.76–1.27)
DEPRECATED RDW RBC AUTO: 46.8 FL (ref 37–54)
EGFRCR SERPLBLD CKD-EPI 2021: 52.6 ML/MIN/1.73
EOSINOPHIL # BLD AUTO: 0.18 10*3/MM3 (ref 0–0.4)
EOSINOPHIL NFR BLD AUTO: 2.4 % (ref 0.3–6.2)
ERYTHROCYTE [DISTWIDTH] IN BLOOD BY AUTOMATED COUNT: 13.3 % (ref 12.3–15.4)
GLUCOSE BLDC GLUCOMTR-MCNC: 229 MG/DL (ref 70–130)
GLUCOSE BLDC GLUCOMTR-MCNC: 281 MG/DL (ref 70–130)
GLUCOSE BLDC GLUCOMTR-MCNC: 283 MG/DL (ref 70–130)
GLUCOSE BLDC GLUCOMTR-MCNC: 312 MG/DL (ref 70–130)
GLUCOSE SERPL-MCNC: 221 MG/DL (ref 65–99)
HCT VFR BLD AUTO: 31.8 % (ref 37.5–51)
HGB BLD-MCNC: 10.4 G/DL (ref 13–17.7)
IMM GRANULOCYTES # BLD AUTO: 0.07 10*3/MM3 (ref 0–0.05)
IMM GRANULOCYTES NFR BLD AUTO: 0.9 % (ref 0–0.5)
LYMPHOCYTES # BLD AUTO: 1.7 10*3/MM3 (ref 0.7–3.1)
LYMPHOCYTES NFR BLD AUTO: 23 % (ref 19.6–45.3)
MCH RBC QN AUTO: 31.4 PG (ref 26.6–33)
MCHC RBC AUTO-ENTMCNC: 32.7 G/DL (ref 31.5–35.7)
MCV RBC AUTO: 96.1 FL (ref 79–97)
MONOCYTES # BLD AUTO: 0.72 10*3/MM3 (ref 0.1–0.9)
MONOCYTES NFR BLD AUTO: 9.8 % (ref 5–12)
NEUTROPHILS NFR BLD AUTO: 4.65 10*3/MM3 (ref 1.7–7)
NEUTROPHILS NFR BLD AUTO: 63.1 % (ref 42.7–76)
NRBC BLD AUTO-RTO: 0 /100 WBC (ref 0–0.2)
PHOSPHATE SERPL-MCNC: 2.3 MG/DL (ref 2.5–4.5)
PLATELET # BLD AUTO: 208 10*3/MM3 (ref 140–450)
PMV BLD AUTO: 11.3 FL (ref 6–12)
POTASSIUM SERPL-SCNC: 4.7 MMOL/L (ref 3.5–5.2)
RBC # BLD AUTO: 3.31 10*6/MM3 (ref 4.14–5.8)
SODIUM SERPL-SCNC: 137 MMOL/L (ref 136–145)
WBC NRBC COR # BLD AUTO: 7.38 10*3/MM3 (ref 3.4–10.8)

## 2025-06-01 PROCEDURE — 63710000001 INSULIN LISPRO (HUMAN) PER 5 UNITS: Performed by: HOSPITALIST

## 2025-06-01 PROCEDURE — 85025 COMPLETE CBC W/AUTO DIFF WBC: CPT | Performed by: HOSPITALIST

## 2025-06-01 PROCEDURE — 82948 REAGENT STRIP/BLOOD GLUCOSE: CPT

## 2025-06-01 PROCEDURE — 63710000001 INSULIN GLARGINE PER 5 UNITS: Performed by: HOSPITALIST

## 2025-06-01 PROCEDURE — 80069 RENAL FUNCTION PANEL: CPT | Performed by: INTERNAL MEDICINE

## 2025-06-01 RX ORDER — GABAPENTIN 300 MG/1
300 CAPSULE ORAL EVERY 8 HOURS SCHEDULED
Status: DISCONTINUED | OUTPATIENT
Start: 2025-06-01 | End: 2025-06-02 | Stop reason: HOSPADM

## 2025-06-01 RX ADMIN — INSULIN LISPRO 4 UNITS: 100 INJECTION, SOLUTION INTRAVENOUS; SUBCUTANEOUS at 11:54

## 2025-06-01 RX ADMIN — GABAPENTIN 300 MG: 300 CAPSULE ORAL at 22:33

## 2025-06-01 RX ADMIN — ASPIRIN 81 MG CHEWABLE TABLET 81 MG: 81 TABLET CHEWABLE at 08:26

## 2025-06-01 RX ADMIN — LEVETIRACETAM 250 MG: 500 TABLET, FILM COATED ORAL at 08:26

## 2025-06-01 RX ADMIN — FAMOTIDINE 20 MG: 20 TABLET, FILM COATED ORAL at 08:26

## 2025-06-01 RX ADMIN — ATORVASTATIN CALCIUM 10 MG: 10 TABLET ORAL at 22:29

## 2025-06-01 RX ADMIN — GABAPENTIN 300 MG: 300 CAPSULE ORAL at 15:59

## 2025-06-01 RX ADMIN — AMLODIPINE BESYLATE 10 MG: 10 TABLET ORAL at 08:26

## 2025-06-01 RX ADMIN — LEVETIRACETAM 250 MG: 500 TABLET, FILM COATED ORAL at 22:30

## 2025-06-01 RX ADMIN — INSULIN LISPRO 3 UNITS: 100 INJECTION, SOLUTION INTRAVENOUS; SUBCUTANEOUS at 08:26

## 2025-06-01 RX ADMIN — INSULIN GLARGINE 10 UNITS: 100 INJECTION, SOLUTION SUBCUTANEOUS at 22:28

## 2025-06-01 RX ADMIN — CLOPIDOGREL BISULFATE 75 MG: 75 TABLET, FILM COATED ORAL at 08:26

## 2025-06-01 RX ADMIN — FAMOTIDINE 20 MG: 20 TABLET, FILM COATED ORAL at 22:30

## 2025-06-01 RX ADMIN — INSULIN LISPRO 4 UNITS: 100 INJECTION, SOLUTION INTRAVENOUS; SUBCUTANEOUS at 22:29

## 2025-06-01 RX ADMIN — DIBASIC SODIUM PHOSPHATE, MONOBASIC POTASSIUM PHOSPHATE AND MONOBASIC SODIUM PHOSPHATE 2 TABLET: 852; 155; 130 TABLET ORAL at 13:54

## 2025-06-01 RX ADMIN — INSULIN LISPRO 5 UNITS: 100 INJECTION, SOLUTION INTRAVENOUS; SUBCUTANEOUS at 17:37

## 2025-06-01 NOTE — PROGRESS NOTES
"Daily progress note    Primary care physician      Subjective  Doing better with no new complaints    History of present illness  65-year-old male with history of diabetes hypertension hyperlipidemia seizure disorder CVA with dysarthria depression and chronic kidney disease stage III who was recently discharged from the hospital after management of acute kidney injury presented to [facility] with generalized weakness and syncopal episode.  Patient has nausea vomiting diarrhea for last several days and felt lightheaded.  Patient denies any headache vision problem speech problem or focal weakness.  Patient workup in ER revealed again acute kidney injury admitted for management.     REVIEW OF SYSTEMS  Unremarkable except weakness     PHYSICAL EXAM   Blood pressure 148/61, pulse 73, temperature 98.4 °F (36.9 °C), temperature source Oral, resp. rate 18, height 165.1 cm (65\"), weight 91.6 kg (202 lb), SpO2 98%.    GENERAL: Alert well-appearing male in no obvious distress.    SKIN: Warm, dry.    HENT: Normocephalic, atraumatic  EYES: no scleral icterus  CV: regular rhythm, regular rate-no murmur  RESPIRATORY: normal effort, moving air bilaterally  ABDOMEN: soft, nontender, nondistended bowel sounds positive  MUSCULOSKELETAL: Spine-no significant bony tenderness to palpation, good range of motion.  NEURO: alert, moves all extremities, follows commands     LAB RESULTS  Lab Results (last 24 hours)       Procedure Component Value Units Date/Time    POC Glucose Once [[id number]]  (Abnormal) Collected: 06/01/25 1132    Specimen: Blood Updated: 06/01/25 1135     Glucose 281 mg/dL     Renal Function Panel [[id number]]  (Abnormal) Collected: 06/01/25 0627    Specimen: Blood Updated: 06/01/25 0737     Glucose 221 mg/dL      BUN 13.0 mg/dL      Creatinine 1.47 mg/dL      Sodium 137 mmol/L      Potassium 4.7 mmol/L      Chloride 103 mmol/L      CO2 23.7 mmol/L      Calcium 9.3 mg/dL      Albumin 3.7 g/dL      " Phosphorus 2.3 mg/dL      Anion Gap 10.3 mmol/L      BUN/Creatinine Ratio 8.8     eGFR 52.6 mL/min/1.73     Narrative:      GFR Categories in Chronic Kidney Disease (CKD)              GFR Category          GFR (mL/min/1.73)    Interpretation  G1                    90 or greater        Normal or high (1)  G2                    60-89                Mild decrease (1)  G3a                   45-59                Mild to moderate decrease  G3b                   30-44                Moderate to severe decrease  G4                    15-29                Severe decrease  G5                    14 or less           Kidney failure    (1)In the absence of evidence of kidney disease, neither GFR category G1 or G2 fulfill the criteria for CKD.    eGFR calculation 2021 CKD-EPI creatinine equation, which does not include race as a factor    CBC & Differential [[id number]]  (Abnormal) Collected: 06/01/25 0627    Specimen: Blood Updated: 06/01/25 0720    Narrative:      The following orders were created for panel order CBC & Differential.  Procedure                               Abnormality         Status                     ---------                               -----------         ------                     CBC Auto Differential[[id number]]        Abnormal            Final result                 Please view results for these tests on the individual orders.    CBC Auto Differential [[id number]]  (Abnormal) Collected: 06/01/25 0627    Specimen: Blood Updated: 06/01/25 0720     WBC 7.38 10*3/mm3      RBC 3.31 10*6/mm3      Hemoglobin 10.4 g/dL      Hematocrit 31.8 %      MCV 96.1 fL      MCH 31.4 pg      MCHC 32.7 g/dL      RDW 13.3 %      RDW-SD 46.8 fl      MPV 11.3 fL      Platelets 208 10*3/mm3      Neutrophil % 63.1 %      Lymphocyte % 23.0 %      Monocyte % 9.8 %      Eosinophil % 2.4 %      Basophil % 0.8 %      Immature Grans % 0.9 %      Neutrophils, Absolute 4.65 10*3/mm3      Lymphocytes, Absolute 1.70 10*3/mm3       Monocytes, Absolute 0.72 10*3/mm3      Eosinophils, Absolute 0.18 10*3/mm3      Basophils, Absolute 0.06 10*3/mm3      Immature Grans, Absolute 0.07 10*3/mm3      nRBC 0.0 /100 WBC     POC Glucose Once [[id number]]  (Abnormal) Collected: 06/01/25 0604    Specimen: Blood Updated: 06/01/25 0605     Glucose 229 mg/dL     POC Glucose Once [[id number]]  (Abnormal) Collected: 05/31/25 2049    Specimen: Blood Updated: 05/31/25 2051     Glucose 265 mg/dL     POC Glucose Once [[id number]]  (Abnormal) Collected: 05/31/25 1629    Specimen: Blood Updated: 05/31/25 1630     Glucose 264 mg/dL           Imaging Results (Last 24 Hours)       ** No results found for the last 24 hours. **          Scan on 5/29/2025 1428 by [employer], [place]: ECG 12-LEAD         Author: -- Service: -- Author Type: --   Filed: Date of Service: Creation Time:   Status: (Other)   HEART RATE=67  bpm  RR Nrmlwdwx=003  ms  MO Vngdqnwu=366  ms  P Horizontal Axis=23  deg  P Front Axis=35  deg  QRSD Interval=98  ms  QT Dnhsqrgr=484  ms  TLeH=670  ms  QRS Axis=-2  deg  T Wave Axis=27  deg  - OTHERWISE NORMAL ECG -  Sinus rhythm  Abnormal R-wave progression, early transition  When compared with ECG of 21-May-2025 15:14:23,  No significant change          Current Facility-Administered Medications:     amLODIPine (NORVASC) tablet 10 mg, 10 mg, Oral, Daily, Atilio Meelndrez MD, 10 mg at 06/01/25 0826    aspirin chewable tablet 81 mg, 81 mg, Oral, Daily, Atilio Melendrez MD, 81 mg at 06/01/25 0826    atorvastatin (LIPITOR) tablet 10 mg, 10 mg, Oral, Nightly, Atilio Melendrez MD, 10 mg at 05/31/25 2040    clopidogrel (PLAVIX) tablet 75 mg, 75 mg, Oral, Daily, Atilio Melendrez MD, 75 mg at 06/01/25 0826    dextrose (D50W) (25 g/50 mL) IV injection 25 g, 25 g, Intravenous, Q15 Min PRN, Atilio Melendrez MD    dextrose (GLUTOSE) oral gel 15 g, 15 g, Oral, Q15 Min PRN, Atilio Melendrez MD    famotidine (PEPCID) tablet 20 mg, 20 mg, Oral, BID, Atilio Melendrez MD, 20 mg at 06/01/25 0885     gabapentin (NEURONTIN) capsule 300 mg, 300 mg, Oral, Q8H, Jaime Melendrez MD    glucagon (GLUCAGEN) injection 1 mg, 1 mg, Intramuscular, Q15 Min PRN, Jaime Melendrez MD    insulin lispro (HUMALOG/ADMELOG) injection 2-7 Units, 2-7 Units, Subcutaneous, 4x Daily AC & at Bedtime, Jaime Melendrez MD, 4 Units at 06/01/25 1154    levETIRAcetam (KEPPRA) tablet 250 mg, 250 mg, Oral, BID, Lily Kilne MD, 250 mg at 06/01/25 0826     ASSESSMENT  Acute kidney injury resolving  Dizziness lightheadedness and syncopal episode secondary to volume depletion and dehydration  History of seizure disorder  Diabetes mellitus  Hypertension  Hyperlipidemia  History of CVA with dysarthria  Depression  Chronic kidney disease stage III    PLAN  CPM  Taper off Keppra per neurology   Resume Neurontin  Neurology consult  appreciated  Nephrology to follow patient  Adjust home medications  Stress ulcer DVT prophylaxis  Supportive care  PT OT  Discussed with nursing staff  Discharge planning    JAIME MELENDREZ MD    Copied text in this note has been reviewed and is accurate as of 06/01/25

## 2025-06-01 NOTE — PROGRESS NOTES
"   LOS: 1 day     Chief Complaint/ Reason for encounter: MARLON/CKD    Subjective   05/31/25 : Patient is doing well today with no new complaints  Good appetite with no nausea or vomiting  No shortness of breath chest pain or edema  Voiding well with no dysuria    6/1: He looks and feels well denies complaints    Medical history reviewed:  History of Present Illness    Subjective    History taken from: Chart and patient/family as able    Vital Signs  Temp:  [98.1 °F (36.7 °C)-98.2 °F (36.8 °C)] 98.1 °F (36.7 °C)  Heart Rate:  [72-88] 77  Resp:  [18] 18  BP: (137-161)/(63-95) 139/63       Wt Readings from Last 1 Encounters:   05/29/25 1713 91.6 kg (202 lb)       Objective:  Vital signs: (most recent): Blood pressure 139/63, pulse 77, temperature 98.1 °F (36.7 °C), temperature source Oral, resp. rate 18, height 165.1 cm (65\"), weight 91.6 kg (202 lb), SpO2 99%.                Objective:  General Appearance:  Comfortable, well-appearing, no acute distress   HEENT: Mucous membranes moist, atraumatic  Lungs:  Normal effort and normal respiratory rate.  Breath sounds clear to auscultation: No rhonchi/Rales.  No  respiratory distress.   Heart:  S1, S2 normal.   Abdomen: Abdomen is soft, nontender/nondistended  Extremities: No edema of bilateral lower extremities  Skin:  Warm and dry       Results Review:    Intake/Output:   No intake or output data in the 24 hours ending 06/01/25 1304        DATA:  Radiology and Labs:  The following labs independently reviewed by me. Additional labs ordered for tomorrow a.m.  Interval notes, chart personally reviewed by me.   Old records independently reviewed showing CKD stage III  Discussed with patient himself at bedside    Risk/ complexity of medical care/ medical decision making moderate complexity of CKD management    Labs:   Recent Results (from the past 24 hours)   POC Glucose Once    Collection Time: 05/31/25  4:29 PM    Specimen: Blood   Result Value Ref Range    Glucose 264 (H) 70 " - 130 mg/dL   POC Glucose Once    Collection Time: 05/31/25  8:49 PM    Specimen: Blood   Result Value Ref Range    Glucose 265 (H) 70 - 130 mg/dL   POC Glucose Once    Collection Time: 06/01/25  6:04 AM    Specimen: Blood   Result Value Ref Range    Glucose 229 (H) 70 - 130 mg/dL   Renal Function Panel    Collection Time: 06/01/25  6:27 AM    Specimen: Blood   Result Value Ref Range    Glucose 221 (H) 65 - 99 mg/dL    BUN 13.0 8.0 - 23.0 mg/dL    Creatinine 1.47 (H) 0.76 - 1.27 mg/dL    Sodium 137 136 - 145 mmol/L    Potassium 4.7 3.5 - 5.2 mmol/L    Chloride 103 98 - 107 mmol/L    CO2 23.7 22.0 - 29.0 mmol/L    Calcium 9.3 8.6 - 10.5 mg/dL    Albumin 3.7 3.5 - 5.2 g/dL    Phosphorus 2.3 (L) 2.5 - 4.5 mg/dL    Anion Gap 10.3 5.0 - 15.0 mmol/L    BUN/Creatinine Ratio 8.8 7.0 - 25.0    eGFR 52.6 (L) >60.0 mL/min/1.73   CBC Auto Differential    Collection Time: 06/01/25  6:27 AM    Specimen: Blood   Result Value Ref Range    WBC 7.38 3.40 - 10.80 10*3/mm3    RBC 3.31 (L) 4.14 - 5.80 10*6/mm3    Hemoglobin 10.4 (L) 13.0 - 17.7 g/dL    Hematocrit 31.8 (L) 37.5 - 51.0 %    MCV 96.1 79.0 - 97.0 fL    MCH 31.4 26.6 - 33.0 pg    MCHC 32.7 31.5 - 35.7 g/dL    RDW 13.3 12.3 - 15.4 %    RDW-SD 46.8 37.0 - 54.0 fl    MPV 11.3 6.0 - 12.0 fL    Platelets 208 140 - 450 10*3/mm3    Neutrophil % 63.1 42.7 - 76.0 %    Lymphocyte % 23.0 19.6 - 45.3 %    Monocyte % 9.8 5.0 - 12.0 %    Eosinophil % 2.4 0.3 - 6.2 %    Basophil % 0.8 0.0 - 1.5 %    Immature Grans % 0.9 (H) 0.0 - 0.5 %    Neutrophils, Absolute 4.65 1.70 - 7.00 10*3/mm3    Lymphocytes, Absolute 1.70 0.70 - 3.10 10*3/mm3    Monocytes, Absolute 0.72 0.10 - 0.90 10*3/mm3    Eosinophils, Absolute 0.18 0.00 - 0.40 10*3/mm3    Basophils, Absolute 0.06 0.00 - 0.20 10*3/mm3    Immature Grans, Absolute 0.07 (H) 0.00 - 0.05 10*3/mm3    nRBC 0.0 0.0 - 0.2 /100 WBC   POC Glucose Once    Collection Time: 06/01/25 11:32 AM    Specimen: Blood   Result Value Ref Range    Glucose 281 (H) 70  - 130 mg/dL       Radiology:  Pertinent radiology studies were reviewed as described above      Medications have been reviewed separately in chart review medication tab      ASSESSMENT:  MARLON, prerenal and improved  CKD stage IIIa, recent baseline creatinine around 1.6  Hypotension, improved after IV fluids  Chronic hypertension, good control  Diabetes mellitus type 2, poorly controlled, A1c 13  Anemia of CKD  Hyponatremia, improved  Syncope  Metabolic acidosis        DISCUSSION/PLAN:   His renal function remains fairly stable.  Creatinine of 1.47 is near his usual baseline of 1.6  Encourage oral fluid intake  BP stable on current regimen  Continue amlodipine 5 mg for BP control  Keppra taper per neurology  Electrolytes and volume at goal  Encourage oral fluid intake, nutrition and hydration    Stable for discharge home from renal standpoint anytime with outpatient follow-up in our office in 2 to 4 weeks      Rick Stallworth MD  [facility]   Office phone number: [phone]  Answering service phone number: [phone]    06/01/25  13:04 EDT    Dictation performed using Dragon dictation software

## 2025-06-01 NOTE — PLAN OF CARE
Problem: Adult Inpatient Plan of Care  Goal: Plan of Care Review  Outcome: Progressing  Flowsheets (Taken 6/1/2025 0507)  Outcome Evaluation: Room air during night, SR on monitor, standby assist to RR, bed alarm, call light within reach.  Goal: Patient-Specific Goal (Individualized)  Outcome: Progressing  Goal: Absence of Hospital-Acquired Illness or Injury  Outcome: Progressing  Intervention: Identify and Manage Fall Risk  Recent Flowsheet Documentation  Taken 6/1/2025 0400 by Marj Portillo RN  Safety Promotion/Fall Prevention:   safety round/check completed   room organization consistent   assistive device/personal items within reach   clutter free environment maintained   fall prevention program maintained   nonskid shoes/slippers when out of bed  Taken 6/1/2025 0200 by Marj Portillo RN  Safety Promotion/Fall Prevention:   safety round/check completed   room organization consistent   assistive device/personal items within reach   clutter free environment maintained   fall prevention program maintained   nonskid shoes/slippers when out of bed  Taken 6/1/2025 0000 by Marj Portillo RN  Safety Promotion/Fall Prevention:   safety round/check completed   room organization consistent   nonskid shoes/slippers when out of bed   fall prevention program maintained   clutter free environment maintained   assistive device/personal items within reach   activity supervised   toileting scheduled  Taken 5/31/2025 2200 by Marj Portillo RN  Safety Promotion/Fall Prevention:   safety round/check completed   room organization consistent  Taken 5/31/2025 2000 by Marj Portillo RN  Safety Promotion/Fall Prevention:   safety round/check completed   room organization consistent   assistive device/personal items within reach   clutter free environment maintained  Intervention: Prevent Skin Injury  Recent Flowsheet Documentation  Taken 6/1/2025 0400 by Marj Portillo, RN  Body Position:   position changed independently    side-lying  Taken 6/1/2025 0200 by Marj Portillo RN  Body Position:   position changed independently   side-lying  Taken 6/1/2025 0000 by Marj Portillo RN  Body Position:   position changed independently   weight shifting  Taken 5/31/2025 2200 by Marj Portillo RN  Body Position:   position changed independently   weight shifting  Taken 5/31/2025 2000 by Marj Portillo RN  Body Position: position changed independently  Intervention: Prevent Infection  Recent Flowsheet Documentation  Taken 6/1/2025 0400 by Marj Portillo RN  Infection Prevention:   environmental surveillance performed   rest/sleep promoted   single patient room provided  Taken 6/1/2025 0200 by Marj Portillo RN  Infection Prevention:   environmental surveillance performed   hand hygiene promoted   rest/sleep promoted   single patient room provided  Taken 6/1/2025 0000 by Marj Portillo RN  Infection Prevention:   single patient room provided   rest/sleep promoted   environmental surveillance performed  Taken 5/31/2025 2200 by Marj Portillo RN  Infection Prevention:   single patient room provided   rest/sleep promoted   environmental surveillance performed  Taken 5/31/2025 2000 by Marj Portillo RN  Infection Prevention:   environmental surveillance performed   single patient room provided   rest/sleep promoted  Goal: Optimal Comfort and Wellbeing  Outcome: Progressing  Intervention: Provide Person-Centered Care  Recent Flowsheet Documentation  Taken 6/1/2025 0000 by Marj Portillo RN  Trust Relationship/Rapport:   care explained   choices provided  Taken 5/31/2025 2000 by Marj Portillo RN  Trust Relationship/Rapport:   care explained   choices provided  Goal: Readiness for Transition of Care  Outcome: Progressing   Goal Outcome Evaluation:              Outcome Evaluation: Room air during night, SR on monitor, standby assist to RR, bed alarm, call light within reach.

## 2025-06-02 ENCOUNTER — READMISSION MANAGEMENT (OUTPATIENT)
Dept: CALL CENTER | Facility: HOSPITAL | Age: 65
End: 2025-06-02
Payer: MEDICARE

## 2025-06-02 VITALS
HEART RATE: 68 BPM | OXYGEN SATURATION: 98 % | HEIGHT: 65 IN | BODY MASS INDEX: 33.66 KG/M2 | RESPIRATION RATE: 18 BRPM | WEIGHT: 202 LBS | TEMPERATURE: 98.1 F | DIASTOLIC BLOOD PRESSURE: 65 MMHG | SYSTOLIC BLOOD PRESSURE: 146 MMHG

## 2025-06-02 LAB
ALBUMIN SERPL-MCNC: 3.8 G/DL (ref 3.5–5.2)
ANION GAP SERPL CALCULATED.3IONS-SCNC: 10.3 MMOL/L (ref 5–15)
BUN SERPL-MCNC: 14 MG/DL (ref 8–23)
BUN/CREAT SERPL: 9.9 (ref 7–25)
CALCIUM SPEC-SCNC: 9.2 MG/DL (ref 8.6–10.5)
CHLORIDE SERPL-SCNC: 102 MMOL/L (ref 98–107)
CO2 SERPL-SCNC: 25.7 MMOL/L (ref 22–29)
CREAT SERPL-MCNC: 1.42 MG/DL (ref 0.76–1.27)
EGFRCR SERPLBLD CKD-EPI 2021: 54.8 ML/MIN/1.73
GLUCOSE BLDC GLUCOMTR-MCNC: 160 MG/DL (ref 70–130)
GLUCOSE BLDC GLUCOMTR-MCNC: 220 MG/DL (ref 70–130)
GLUCOSE SERPL-MCNC: 169 MG/DL (ref 65–99)
PHOSPHATE SERPL-MCNC: 3.3 MG/DL (ref 2.5–4.5)
POTASSIUM SERPL-SCNC: 4.4 MMOL/L (ref 3.5–5.2)
SODIUM SERPL-SCNC: 138 MMOL/L (ref 136–145)

## 2025-06-02 PROCEDURE — 82948 REAGENT STRIP/BLOOD GLUCOSE: CPT

## 2025-06-02 PROCEDURE — 97530 THERAPEUTIC ACTIVITIES: CPT

## 2025-06-02 PROCEDURE — 97110 THERAPEUTIC EXERCISES: CPT

## 2025-06-02 PROCEDURE — 80069 RENAL FUNCTION PANEL: CPT | Performed by: INTERNAL MEDICINE

## 2025-06-02 PROCEDURE — 63710000001 INSULIN LISPRO (HUMAN) PER 5 UNITS: Performed by: HOSPITALIST

## 2025-06-02 RX ORDER — INSULIN GLARGINE 100 [IU]/ML
10 INJECTION, SOLUTION SUBCUTANEOUS NIGHTLY
Qty: 15 ML | Refills: 0 | Status: SHIPPED | OUTPATIENT
Start: 2025-06-02 | End: 2025-10-20

## 2025-06-02 RX ORDER — ATORVASTATIN CALCIUM 10 MG/1
10 TABLET, FILM COATED ORAL NIGHTLY
Qty: 30 TABLET | Refills: 0 | Status: SHIPPED | OUTPATIENT
Start: 2025-06-02 | End: 2025-07-02

## 2025-06-02 RX ORDER — INSULIN LISPRO 100 [IU]/ML
3 INJECTION, SOLUTION INTRAVENOUS; SUBCUTANEOUS
Qty: 15 ML | Refills: 0 | Status: SHIPPED | OUTPATIENT
Start: 2025-06-02 | End: 2025-10-20

## 2025-06-02 RX ORDER — ACYCLOVIR 400 MG/1
1 TABLET ORAL
Qty: 3 EACH | Refills: 0 | Status: SHIPPED | OUTPATIENT
Start: 2025-06-02

## 2025-06-02 RX ORDER — AVOBENZONE, HOMOSALATE, OCTISALATE, OCTOCRYLENE 30; 40; 45; 26 MG/ML; MG/ML; MG/ML; MG/ML
1 CREAM TOPICAL 4 TIMES DAILY
Qty: 100 EACH | Refills: 0 | Status: SHIPPED | OUTPATIENT
Start: 2025-06-02

## 2025-06-02 RX ORDER — BLOOD-GLUCOSE METER
1 EACH MISCELLANEOUS 4 TIMES DAILY
Qty: 1 KIT | Refills: 0 | Status: SHIPPED | OUTPATIENT
Start: 2025-06-02

## 2025-06-02 RX ADMIN — CLOPIDOGREL BISULFATE 75 MG: 75 TABLET, FILM COATED ORAL at 09:37

## 2025-06-02 RX ADMIN — INSULIN LISPRO 2 UNITS: 100 INJECTION, SOLUTION INTRAVENOUS; SUBCUTANEOUS at 09:37

## 2025-06-02 RX ADMIN — GABAPENTIN 300 MG: 300 CAPSULE ORAL at 06:13

## 2025-06-02 RX ADMIN — FAMOTIDINE 20 MG: 20 TABLET, FILM COATED ORAL at 09:37

## 2025-06-02 RX ADMIN — ASPIRIN 81 MG CHEWABLE TABLET 81 MG: 81 TABLET CHEWABLE at 09:37

## 2025-06-02 RX ADMIN — AMLODIPINE BESYLATE 10 MG: 10 TABLET ORAL at 09:37

## 2025-06-02 RX ADMIN — INSULIN LISPRO 2 UNITS: 100 INJECTION, SOLUTION INTRAVENOUS; SUBCUTANEOUS at 12:37

## 2025-06-02 NOTE — DISCHARGE SUMMARY
Discharge summary    Date of admission 5/29/2025  Date of discharge 6/2/2025    Final diagnosis  Acute kidney injury resolved  History of seizure disorder  Diabetes mellitus  Hypertension  Hyperlipidemia  History of CVA with dysarthria  Depression  Chronic kidney disease stage III    Discharge medications    Current Facility-Administered Medications:     amLODIPine (NORVASC) tablet 10 mg, 10 mg, Oral, Daily, Atilio Melendrez MD, 10 mg at 06/02/25 0937    aspirin chewable tablet 81 mg, 81 mg, Oral, Daily, Atilio Melendrez MD, 81 mg at 06/02/25 0937    atorvastatin (LIPITOR) tablet 10 mg, 10 mg, Oral, Nightly, Atilio Melendrez MD, 10 mg at 06/01/25 2229    clopidogrel (PLAVIX) tablet 75 mg, 75 mg, Oral, Daily, Atilio Melendrez MD, 75 mg at 06/02/25 0937    famotidine (PEPCID) tablet 20 mg, 20 mg, Oral, BID, Atilio Melendrez MD, 20 mg at 06/02/25 0937    gabapentin (NEURONTIN) capsule 300 mg, 300 mg, Oral, Q8H, Atilio Melendrez MD, 300 mg at 06/02/25 0613    insulin glargine (LANTUS, SEMGLEE) injection 10 Units, 10 Units, Subcutaneous, Nightly, Atilio Melendrez MD, 10 Units at 06/01/25 2228    insulin lispro (HUMALOG/ADMELOG) injection 2-7 Units, 2-7 Units, Subcutaneous, 4x Daily AC & at Bedtime, Atilio Melendrez MD, 2 Units at 06/02/25 0937     Consults obtained  Neurology  Nephrology  Diabetic education    Procedures  None    Hospital course  65-year-old white male with multiple medical problem including diabetes hypertension hyperlipidemia CVA with dysarthria depression and chronic kidney disease who also has had seizure in the past and has been taking Keppra admitted to emergency room with generalized weakness dizziness lightheadedness and had a syncopal episode.  Patient workup in ER revealed acute kidney injury admitted for management.  Patient admitted treated with IV fluid and his diuretics was held and medication adjusted and further evaluated by neurology and nephrology.  Neurology recommend to taper off Keppra as he does not need  anymore and his kidney function returned to baseline.  Patient insulin dose also adjusted during this hospitalization.  Patient is feeling better and cleared for discharge.    Discharge diet regular    Activity as tolerated    Medication as above    Follow-up with primary doctor in 1 week and follow-up with neurology and nephrology per the instructions and take medication as directed.    JAIME PARK MD

## 2025-06-02 NOTE — THERAPY TREATMENT NOTE
Patient Name: Eric Simms  : 1960    MRN: [id number]                              Today's Date: 2025       Admit Date: 2025    Visit Dx:     ICD-10-CM ICD-9-CM   1. Type 2 diabetes mellitus with hyperglycemia, with long-term current use of insulin  E11.65 250.00    Z79.4 790.29     V58.67   2. Syncope, unspecified syncope type  R55 780.2   3. Acute renal failure, unspecified acute renal failure type  N17.9 584.9     Patient Active Problem List   Diagnosis    Chest pain, unspecified type    Type 2 diabetes mellitus with hyperglycemia    HTN (hypertension)    Obesity (BMI 30-39.9)    Anemia, chronic disease    CKD (chronic kidney disease) stage 2, GFR 60-89 ml/min    Seizure disorder    MARLON (acute kidney injury)    Acute kidney injury    Syncope     Past Medical History:   Diagnosis Date    Coronary artery disease     Diabetes mellitus     GERD (gastroesophageal reflux disease)     Hyperlipidemia     Hypertension     Peripheral neuropathy     Seizures     Stroke      History reviewed. No pertinent surgical history.   General Information       Row Name 25 1020          Physical Therapy Time and Intention    Document Type therapy note (daily note)  -MG (r) SB (t) MG (c)     Mode of Treatment physical therapy  -MG (r) SB (t) MG (c)       Row Name 25 1020          General Information    Existing Precautions/Restrictions fall;other (see comments)  h/o syncopal episodes  -MG (r) SB (t) MG (c)       Row Name 25 1020          Cognition    Orientation Status (Cognition) oriented x 4  -MG (r) SB (t) MG (c)       Row Name 25 1020          Safety Issues/Impairments Affecting Functional Mobility    Impairments Affecting Function (Mobility) strength  -MG (r) SB (t) MG (c)     Comment, Safety Issues/Impairments (Mobility) Gait belt and non-skid socks donned.  -MG (r) SB (t) MG (c)               User Key  (r) = Recorded By, (t) = Taken By, (c) = Cosigned By      Initials Name Provider Type     Ana M Pearson, PT Physical Therapist    Kartik Wong, PT Student PT Student                   Mobility       Row Name 06/02/25 1022          Bed Mobility    Bed Mobility supine-sit  -MG (r) SB (t) MG (c)     Supine-Sit [place] (Bed Mobility) supervision  -MG (r) SB (t) MG (c)     Assistive Device (Bed Mobility) bed rails;head of bed elevated  -MG (r) SB (t) MG (c)       Row Name 06/02/25 1022          Sit-Stand Transfer    Sit-Stand [place]agan (Transfers) standby assist  -MG (r) SB (t) MG (c)     Assistive Device (Sit-Stand Transfers) walker, front-wheeled  -MG (r) SB (t) MG (c)       Row Name 06/02/25 1022          Gait/Stairs (Locomotion)    Hagan Level (Gait) standby assist  -MG (r) SB (t) MG (c)     Assistive Device (Gait) walker, front-wheeled  -MG (r) SB (t) MG (c)     Patient was able to Ambulate yes  -MG (r) SB (t) MG (c)     Distance in Feet (Gait) 400  -MG (r) SB (t) MG (c)     Deviations/Abnormal Patterns (Gait) gait speed decreased  -MG (r) SB (t) MG (c)     Bilateral Gait Deviations forward flexed posture;heel strike decreased  -MG (r) SB (t) MG (c)     Comment, (Gait/Stairs) No dizziness, SOB, LOB or buckling. Reports feeling at his baseline mobility. Pt walked additional 15' w/out AD w/ SBA in room from EOB to recliner.  -MG (r) SB (t) MG (c)               User Key  (r) = Recorded By, (t) = Taken By, (c) = Cosigned By      Initials Name Provider Type    MG Ana M Rivers, PT Physical Therapist    Kartik Wong, PT Student PT Student                   Obj/Interventions       Row Name 06/02/25 1025          Motor Skills    Therapeutic Exercise other (see comments)  Pt performed AP, LAQ, seated marches, Bicep curls, cross punches, scap retractions, shoulder shrugs, all x15 bilaterally, and overhead reaches x10 but DC d/t neck cramp. Pt performed 10x STS at EOB w/out AD.  -MG (r) SB (t) MG (c)       Row Name 06/02/25 1025          Balance    Balance Assessment --  -MG (r) SB (t) MG  (c)     Static Sitting Balance independent  -MG (r) SB (t) MG (c)     Dynamic Sitting Balance supervision  -MG (r) SB (t) MG (c)     Position, Sitting Balance sitting edge of bed  -MG (r) SB (t) MG (c)     Static Standing Balance standby assist  -MG (r) SB (t) MG (c)     Dynamic Standing Balance standby assist  -MG (r) SB (t) MG (c)     Position/Device Used, Standing Balance walker, front-wheeled;supported  -MG (r) SB (t) MG (c)       Row Name 06/02/25 1025          Sensory Assessment (Somatosensory)    Sensory Assessment (Somatosensory) sensation intact  -MG (r) SB (t) MG (c)               User Key  (r) = Recorded By, (t) = Taken By, (c) = Cosigned By      Initials Name Provider Type    Ana M Pearson, PT Physical Therapist    Kartik Wong, PT Student PT Student                   Goals/Plan       Row Name 06/02/25 1039          Transfer Goal 1 (PT)    Progress/Outcome (Transfer Goal 1, PT) good progress toward goal  -MG (r) SB (t) MG (c)       Row Name 06/02/25 1039          Gait Training Goal 1 (PT)    Progress/Outcome (Gait Training Goal 1, PT) good progress toward goal  -MG (r) SB (t) MG (c)               User Key  (r) = Recorded By, (t) = Taken By, (c) = Cosigned By      Initials Name Provider Type    Ana M Pearson, PT Physical Therapist    Kartik Wong, PT Student PT Student                   Clinical Impression       Row Name 06/02/25 1026          Pain    Pretreatment Pain Rating 0/10 - no pain  -MG (r) SB (t) MG (c)     Posttreatment Pain Rating 0/10 - no pain  -MG (r) SB (t) MG (c)       Row Name 06/02/25 1026          Plan of Care Review    Plan of Care Reviewed With patient  -MG (r) SB (t) MG (c)     Progress improving  -MG (r) SB (t) MG (c)     Outcome Evaluation Pt seen for PT tx this AM and was able to tolerate the session well. Pt was A&O x4. Today, pt was supervision for bed mobility, SBA for STS and transfers w/ RW, SBA ambulating 400' w/ RW, SBA ambulating 15' in room w/out AD but did  use furniture for support. Pt performed AP, LAQ, seated marches, Bicep curls, cross punches, scap retractions, shoulder shrugs, all x15 bilaterally, and overhead reaches x10 but DC d/t neck cramp. Pt performed 10 STS at EOB w/ SBA & w/out RW. While remaining standing for nsg to give meds pt had post LOB that he was able to self-correct. Pt had no other overt LOB, buckling, SOB, or dizziness. Discussed w/ RN. PT will cont to follow and rec HH at [place].  -MG (r) SB (t) MG (c)       Row Name 06/02/25 1026          Therapy Assessment/Plan (PT)    Rehab Potential (PT) good  -MG (r) SB (t) MG (c)     Criteria for Skilled Interventions Met (PT) yes  -MG (r) SB (t) MG (c)     Therapy Frequency (PT) 3 times/wk  -MG (r) SB (t) MG (c)       Row Name 06/02/25 1026          Vital Signs    O2 Delivery Pre Treatment room air  -MG (r) SB (t) MG (c)     O2 Delivery Intra Treatment room air  -MG (r) SB (t) MG (c)     O2 Delivery Post Treatment room air  -MG (r) SB (t) MG (c)     Pre Patient Position Supine  -MG (r) SB (t) MG (c)     Intra Patient Position Standing  -MG (r) SB (t) MG (c)     Post Patient Position Sitting  -MG (r) SB (t) MG (c)       Row Name 06/02/25 1026          Positioning and Restraints    Pre-Treatment Position in bed  -MG (r) SB (t) MG (c)     Post Treatment Position chair  -MG (r) SB (t) MG (c)     In Chair notified nsg;call light within reach;encouraged to call for assist;exit alarm on;reclined;legs elevated  -MG (r) SB (t) MG (c)               User Key  (r) = Recorded By, (t) = Taken By, (c) = Cosigned By      Initials Name Provider Type    Ana M Pearson, PT Physical Therapist    Kartik Wong, PT Student PT Student                   Outcome Measures       Row Name 06/02/25 1040          How much help from another person do you currently need...    Turning from your back to your side while in flat bed without using bedrails? 4  -MG (r) SB (t) MG (c)     Moving from lying on back to sitting on the side of a  flat bed without bedrails? 4  -MG (r) SB (t) MG (c)     Moving to and from a bed to a chair (including a wheelchair)? 3  -MG (r) SB (t) MG (c)     Standing up from a chair using your arms (e.g., wheelchair, bedside chair)? 4  -MG (r) SB (t) MG (c)     Climbing 3-5 steps with a railing? 3  -MG (r) SB (t) MG (c)     To walk in hospital room? 3  -MG (r) SB (t) MG (c)     AM-PAC 6 Clicks Score (PT) 21  -MG (r) SB (t)               User Key  (r) = Recorded By, (t) = Taken By, (c) = Cosigned By      Initials Name Provider Type    MG Ana M Rivers, PT Physical Therapist    SB Kartik Nuñez, PT Student PT Student                                 Physical Therapy Education       Title: PT OT SLP Therapies (Done)       Topic: Physical Therapy (Done)       Point: Mobility training (Done)       Learning Progress Summary            Patient Acceptance, E, DU,VU by SB at 6/2/2025 1046    Acceptance, E,TB, VU by CB at 5/30/2025 2228    Acceptance, E, VU by MG at 5/30/2025 1530                      Point: Home exercise program (Done)       Learning Progress Summary            Patient Acceptance, E,TB, VU by CB at 5/30/2025 2228                      Point: Body mechanics (Done)       Learning Progress Summary            Patient Acceptance, E, DU,VU by SB at 6/2/2025 1046    Acceptance, E,TB, VU by CB at 5/30/2025 2228    Acceptance, E, VU by MG at 5/30/2025 1530                      Point: Precautions (Done)       Learning Progress Summary            Patient Acceptance, E, DU,VU by SB at 6/2/2025 1046    Acceptance, E,TB, VU by CB at 5/30/2025 2228    Acceptance, E, VU by MG at 5/30/2025 1530                                      User Key       Initials Effective Dates Name Provider Type Discipline    MG 05/24/22 -  Ana M Rivers, PT Physical Therapist PT    CB 02/21/24 -  Maksim Nielsen, RN Registered Nurse Nurse    SB 05/19/25 -  Kartik Nuñez, PT Student PT Student PT                  PT Recommendation and Plan     Progress:  improving  Outcome Evaluation: Pt seen for PT tx this AM and was able to tolerate the session well. Pt was A&O x4. Today, pt was supervision for bed mobility, SBA for STS and transfers w/ RW, SBA ambulating 400' w/ RW, SBA ambulating 15' in room w/out AD but did use furniture for support. Pt performed AP, LAQ, seated marches, Bicep curls, cross punches, scap retractions, shoulder shrugs, all x15 bilaterally, and overhead reaches x10 but DC d/t neck cramp. Pt performed 10 STS at EOB w/ SBA & w/out RW. While remaining standing for nsg to give meds pt had post LOB that he was able to self-correct. Pt had no other overt LOB, buckling, SOB, or dizziness. Discussed w/ RN. PT will cont to follow and rec HH at [place].     Time Calculation:         PT Charges       Row Name 06/02/25 1100             Time Calculation    Start Time 0926  -MG (r) SB (t) MG (c)      Stop Time 0950  -MG (r) SB (t) MG (c)      Time Calculation (min) 24 min  -MG (r) SB (t)      PT Received On 06/02/25  -MG (r) SB (t) MG (c)      PT - Next Appointment 06/04/25  -MG (r) SB (t) MG (c)      PT Goal Re-Cert Due Date 06/13/25  -MG (r) SB (t) MG (c)         Time Calculation- PT    Total Timed Code Minutes- PT 24 minute(s)  -MG (r) SB (t) MG (c)                User Key  (r) = Recorded By, (t) = Taken By, (c) = Cosigned By      Initials Name Provider Type    MG Ana M Rivers, PT Physical Therapist    Kartik Wong, PT Student PT Student                      PT G-Codes  Outcome Measure Options: AM-PAC 6 Clicks Daily Activity (OT), Modified [place]  AM-PAC 6 Clicks Score (PT): 21  AM-PAC 6 Clicks Score (OT): 20  Modified [place] Scale: 3 - Moderate disability.  Requiring some help, but able to walk without assistance.  PT Discharge Summary  Anticipated Discharge Disposition (PT): home with assist, home with home health    Kartik Nuñez, PT Student  6/2/2025

## 2025-06-02 NOTE — PLAN OF CARE
Problem: Adult Inpatient Plan of Care  Goal: Plan of Care Review  Outcome: Not Progressing  Flowsheets (Taken 6/2/2025 0452)  Outcome Evaluation: Room air during night, blood glucose reading high - given sliding scale per MAR, makes needs known, call light within reach, bed alarm - standby.  Goal: Patient-Specific Goal (Individualized)  Outcome: Not Progressing  Goal: Absence of Hospital-Acquired Illness or Injury  Outcome: Not Progressing  Intervention: Identify and Manage Fall Risk  Recent Flowsheet Documentation  Taken 6/2/2025 0400 by Marj Portillo RN  Safety Promotion/Fall Prevention:   safety round/check completed   room organization consistent   assistive device/personal items within reach   clutter free environment maintained  Taken 6/2/2025 0200 by Marj Portillo RN  Safety Promotion/Fall Prevention:   safety round/check completed   room organization consistent   assistive device/personal items within reach   clutter free environment maintained  Taken 6/2/2025 0000 by Marj Portillo RN  Safety Promotion/Fall Prevention:   safety round/check completed   room organization consistent   assistive device/personal items within reach   clutter free environment maintained  Taken 6/1/2025 2200 by Marj Portillo RN  Safety Promotion/Fall Prevention:   safety round/check completed   room organization consistent   assistive device/personal items within reach   clutter free environment maintained  Taken 6/1/2025 2000 by Marj Portillo RN  Safety Promotion/Fall Prevention:   safety round/check completed   room organization consistent   assistive device/personal items within reach   clutter free environment maintained  Intervention: Prevent Skin Injury  Recent Flowsheet Documentation  Taken 6/2/2025 0400 by Marj Portillo RN  Body Position:   position changed independently   weight shifting  Taken 6/2/2025 0200 by Marj Portillo RN  Body Position:   position changed independently   weight shifting    side-lying  Taken 6/2/2025 0000 by Marj Portillo RN  Body Position: position changed independently  Skin Protection: transparent dressing maintained  Taken 6/1/2025 2200 by Marj Portillo RN  Body Position:   position changed independently   dangle, side of bed  Taken 6/1/2025 2000 by Marj Portillo RN  Body Position:   position changed independently   weight shifting  Skin Protection: transparent dressing maintained  Intervention: Prevent Infection  Recent Flowsheet Documentation  Taken 6/2/2025 0400 by Marj Portillo RN  Infection Prevention:   single patient room provided   rest/sleep promoted   environmental surveillance performed  Taken 6/2/2025 0200 by Marj Portillo RN  Infection Prevention:   single patient room provided   rest/sleep promoted   environmental surveillance performed  Taken 6/2/2025 0000 by Marj Portillo RN  Infection Prevention:   single patient room provided   rest/sleep promoted   environmental surveillance performed  Taken 6/1/2025 2200 by Marj Portillo RN  Infection Prevention:   single patient room provided   rest/sleep promoted   environmental surveillance performed  Taken 6/1/2025 2000 by Marj Portillo RN  Infection Prevention:   single patient room provided   rest/sleep promoted   environmental surveillance performed  Goal: Optimal Comfort and Wellbeing  Outcome: Not Progressing  Goal: Readiness for Transition of Care  Outcome: Not Progressing   Goal Outcome Evaluation:              Outcome Evaluation: Room air during night, blood glucose reading high - given sliding scale per MAR, makes needs known, call light within reach, bed alarm - standby.

## 2025-06-02 NOTE — OUTREACH NOTE
Prep Survey      Flowsheet Row Responses   [facility] facility patient discharged from? [place]   Is LACE score < 7 ? No   Eligibility Readm Mgmt   Discharge diagnosis Syncope   Does the patient have one of the following disease processes/diagnoses(primary or secondary)? Other   Does the patient have Home health ordered? Yes   [facility]   Prep survey completed? Yes            Rosio BOURNE - Registered Nurse

## 2025-06-02 NOTE — PROGRESS NOTES
[facility]     Progress Note    Patient Name: Eric Simms  : 1960  MRN: [id number]  Primary Care Physician:  Erlin Madrigal MD  Date of admission: 2025    Subjective   Subjective     Chief Complaint: marlon on ckd III    History of Present Illness  Patient with marlno on ckd III    Review of Systems    Objective   Objective     Vitals:   Temp:  [98.1 °F (36.7 °C)-98.4 °F (36.9 °C)] 98.1 °F (36.7 °C)  Heart Rate:  [68-77] 68  Resp:  [18] 18  BP: (120-169)/(61-76) 146/65    Physical Exam   General Appearance:  In no acute distress.    HEENT: Normal HEENT exam.     Extremities: .  There is no deformity, effusion or dependent edema.    Neurological: Patient is alert     Result Review    Result Review:  I have personally reviewed the results from the time of this admission to 2025 10:08 EDT and agree with these findings:  []  Laboratory list / accordion  []  Microbiology  []  Radiology  []  EKG/Telemetry   []  Cardiology/Vascular   []  Pathology  []  Old records  []  Other:  Most notable findings include:       Assessment & Plan   Assessment / Plan     Brief Patient Summary:  Eric Simms is a 65 y.o. male who has marlon on ckd III    Active Hospital Problems:  Active Hospital Problems    Diagnosis     **Syncope      Plan:   MARLON, prerenal and improved  CKD stage IIIa, recent baseline creatinine around 1.6  Hypotension, improved after IV fluids  Chronic hypertension, good control  Diabetes mellitus type 2, poorly controlled, A1c 13  Anemia of CKD  Hyponatremia, improved  Syncope  Metabolic acidosis      Patient seen and examined. Awake, alert. No distress. Labs and chart reviewed. Renal function stable.  Cr at baseline. Tolerating po. Ok to d/c from renal standpoint    Arti Erickson MD

## 2025-06-02 NOTE — PLAN OF CARE
Goal Outcome Evaluation:  Plan of Care Reviewed With: patient        Progress: improving  Outcome Evaluation: Pt seen for PT tx this AM and was able to tolerate the session well. Pt was A&O x4. Today, pt was supervision for bed mobility, SBA for STS and transfers w/ RW, SBA ambulating 400' w/ RW, SBA ambulating 15' in room w/out AD but did use furniture for support. Pt performed AP, LAQ, seated marches, Bicep curls, cross punches, scap retractions, shoulder shrugs, all x15 bilaterally, and overhead reaches x10 but DC d/t neck cramp. Pt performed 10 STS at EOB w/ SBA & w/out RW. While remaining standing for nsg to give meds pt had post LOB that he was able to self-correct. Pt had no other overt LOB, buckling, SOB, or dizziness. Discussed w/ RN. PT will cont to follow and rec HH at [place].    Anticipated Discharge Disposition (PT): home with assist, home with home health

## 2025-06-02 NOTE — CASE MANAGEMENT/SOCIAL WORK
Case Management Discharge Note      Final Note: home with current caretenders via [facility]         Selected Continued Care - Discharged on 6/2/2025 Admission date: 5/29/2025 - Discharge disposition: Home or Self Care      Destination    No services have been selected for the patient.                Durable Medical Equipment    No services have been selected for the patient.                Dialysis/Infusion    No services have been selected for the patient.                Home Medical Care       Service Provider Services Address Phone Fax Patient Preferred    [facility] [address], [address], [facility] [address] [phone] --              Therapy    No services have been selected for the patient.                Community Resources    No services have been selected for the patient.                Community & DME    No services have been selected for the patient.                    Selected Continued Care - Prior Encounters Includes continued care and service providers with selected services from prior encounters from 2/28/2025 to 6/2/2025      Discharged on 5/26/2025 Admission date: 5/21/2025 - Discharge [place] [facility]      [place]       Service Provider Services Address Phone Fax Patient Preferred    [facility] [address], [address], [facility] [address] [phone] --                          Transportation Services  Taxi: Cassie    Final Discharge Disposition Code: 06 - home with home health care

## 2025-06-02 NOTE — PROGRESS NOTES
"Daily progress note    Primary care physician      Subjective  Doing better with no new complaints and wants to go home    History of present illness  65-year-old male with history of diabetes hypertension hyperlipidemia seizure disorder CVA with dysarthria depression and chronic kidney disease stage III who was recently discharged from the hospital after management of acute kidney injury presented to [facility] with generalized weakness and syncopal episode.  Patient has nausea vomiting diarrhea for last several days and felt lightheaded.  Patient denies any headache vision problem speech problem or focal weakness.  Patient workup in ER revealed again acute kidney injury admitted for management.     REVIEW OF SYSTEMS  Unremarkable except weakness     PHYSICAL EXAM   Blood pressure 146/65, pulse 68, temperature 98.1 °F (36.7 °C), temperature source Oral, resp. rate 18, height 165.1 cm (65\"), weight 91.6 kg (202 lb), SpO2 98%.    GENERAL: Alert well-appearing male in no obvious distress.    SKIN: Warm, dry.    HENT: Normocephalic, atraumatic  EYES: no scleral icterus  CV: regular rhythm, regular rate-no murmur  RESPIRATORY: normal effort, moving air bilaterally  ABDOMEN: soft, nontender, nondistended bowel sounds positive  MUSCULOSKELETAL: Spine-no significant bony tenderness to palpation, good range of motion.  NEURO: alert, moves all extremities, follows commands     LAB RESULTS  Lab Results (last 24 hours)       Procedure Component Value Units Date/Time    POC Glucose Once [[id number]]  (Abnormal) Collected: 06/02/25 1106    Specimen: Blood Updated: 06/02/25 1108     Glucose 220 mg/dL     Renal Function Panel [[id number]]  (Abnormal) Collected: 06/02/25 0446    Specimen: Blood Updated: 06/02/25 0621     Glucose 169 mg/dL      BUN 14.0 mg/dL      Creatinine 1.42 mg/dL      Sodium 138 mmol/L      Potassium 4.4 mmol/L      Chloride 102 mmol/L      CO2 25.7 mmol/L      Calcium 9.2 mg/dL      Albumin " 3.8 g/dL      Phosphorus 3.3 mg/dL      Anion Gap 10.3 mmol/L      BUN/Creatinine Ratio 9.9     eGFR 54.8 mL/min/1.73     Narrative:      GFR Categories in Chronic Kidney Disease (CKD)              GFR Category          GFR (mL/min/1.73)    Interpretation  G1                    90 or greater        Normal or high (1)  G2                    60-89                Mild decrease (1)  G3a                   45-59                Mild to moderate decrease  G3b                   30-44                Moderate to severe decrease  G4                    15-29                Severe decrease  G5                    14 or less           Kidney failure    (1)In the absence of evidence of kidney disease, neither GFR category G1 or G2 fulfill the criteria for CKD.    eGFR calculation 2021 CKD-EPI creatinine equation, which does not include race as a factor    POC Glucose Once [[id number]]  (Abnormal) Collected: 06/02/25 0559    Specimen: Blood Updated: 06/02/25 0600     Glucose 160 mg/dL     POC Glucose Once [[id number]]  (Abnormal) Collected: 06/01/25 2101    Specimen: Blood Updated: 06/01/25 2104     Glucose 283 mg/dL     POC Glucose Once [[id number]]  (Abnormal) Collected: 06/01/25 1642    Specimen: Blood Updated: 06/01/25 1644     Glucose 312 mg/dL           Imaging Results (Last 24 Hours)       ** No results found for the last 24 hours. **          Scan on 5/29/2025 1428 by [employer], [place]: ECG 12-LEAD         Author: -- Service: -- Author Type: --   Filed: Date of Service: Creation Time:   Status: (Other)   HEART RATE=67  bpm  RR Dmfmrxti=440  ms  MO Gwddwmdm=540  ms  P Horizontal Axis=23  deg  P Front Axis=35  deg  QRSD Interval=98  ms  QT Ejldynwh=768  ms  BJgZ=044  ms  QRS Axis=-2  deg  T Wave Axis=27  deg  - OTHERWISE NORMAL ECG -  Sinus rhythm  Abnormal R-wave progression, early transition  When compared with ECG of 21-May-2025 15:14:23,  No significant change          Current Facility-Administered Medications:      amLODIPine (NORVASC) tablet 10 mg, 10 mg, Oral, Daily, Jaime Melendrez MD, 10 mg at 06/02/25 0937    aspirin chewable tablet 81 mg, 81 mg, Oral, Daily, Jaime Melendrez MD, 81 mg at 06/02/25 0937    atorvastatin (LIPITOR) tablet 10 mg, 10 mg, Oral, Nightly, Jaime Melendrez MD, 10 mg at 06/01/25 2229    clopidogrel (PLAVIX) tablet 75 mg, 75 mg, Oral, Daily, Jaime Melendrez MD, 75 mg at 06/02/25 0937    famotidine (PEPCID) tablet 20 mg, 20 mg, Oral, BID, Jaime Melendrez MD, 20 mg at 06/02/25 0937    gabapentin (NEURONTIN) capsule 300 mg, 300 mg, Oral, Q8H, Jaime Melendrez MD, 300 mg at 06/02/25 0613    insulin glargine (LANTUS, SEMGLEE) injection 10 Units, 10 Units, Subcutaneous, Nightly, Jaime Melendrez MD, 10 Units at 06/01/25 2228    insulin lispro (HUMALOG/ADMELOG) injection 2-7 Units, 2-7 Units, Subcutaneous, 4x Daily AC & at Bedtime, Jaime Melendrez MD, 2 Units at 06/02/25 0937     ASSESSMENT  Acute kidney injury resolved  Dizziness lightheadedness and syncopal episode   History of seizure disorder  Diabetes mellitus  Hypertension  Hyperlipidemia  History of CVA with dysarthria  Depression  Chronic kidney disease stage III    PLAN  Discharge home  Discharge summary dictated    JAIME MELENDREZ MD    Copied text in this note has been reviewed and is accurate as of 06/02/25

## 2025-06-02 NOTE — CASE MANAGEMENT/SOCIAL WORK
Continued Stay Note  [facility]     Patient Name: Eric Simms  MRN: [id number]  Today's Date: 6/2/2025    Admit Date: 5/29/2025    Plan: Home with [facility]   Discharge Plan       Row Name 06/02/25 1315       Plan    Plan Home with [facility]    Plan Comments Home with [facility] with whom patient is current. Orders for home SN and PT entered. Justine with [facility] notified of discharge.                   Discharge Codes    No documentation.                 Expected Discharge Date and Time       Expected Discharge Date Expected Discharge Time    Jun 2, 2025               Michelle Contreras RN

## 2025-06-12 ENCOUNTER — READMISSION MANAGEMENT (OUTPATIENT)
Dept: CALL CENTER | Facility: HOSPITAL | Age: 65
End: 2025-06-12
Payer: MEDICARE

## 2025-06-12 NOTE — OUTREACH NOTE
Medical Week 2 Survey      Flowsheet Row Responses   [facility] patient discharged from? [place]   Does the patient have one of the following disease processes/diagnoses(primary or secondary)? Other   Week 2 attempt successful? No   Unsuccessful attempts Attempt 1            Rosio BOURNE - Registered Nurse

## 2025-06-16 ENCOUNTER — READMISSION MANAGEMENT (OUTPATIENT)
Dept: CALL CENTER | Facility: HOSPITAL | Age: 65
End: 2025-06-16
Payer: MEDICARE

## 2025-06-16 NOTE — OUTREACH NOTE
Medical Week 2 Survey      Flowsheet Row Responses   [facility] patient discharged from? [place]   Does the patient have one of the following disease processes/diagnoses(primary or secondary)? Other   Week 2 attempt successful? Yes   Call start time 1547   Discharge diagnosis Syncope   Call end time 1554   Meds reviewed with patient/caregiver? Yes   Is the patient having any side effects they believe may be caused by any medication additions or changes? No   Does the patient have all medications ordered at discharge? Yes   Is the patient taking all medications as directed (includes completed medication regime)? Yes   Does the patient have a primary care provider?  Yes   Does the patient have an appointment with their PCP within 7 days of discharge? No   Comments regarding PCP appt first week of July   Nursing Interventions Verified appointment date/time/provider   Has the patient kept scheduled appointments due by today? N/A   What is the Home health agency?  [facility]   Has home health visited the patient within 72 hours of discharge? Yes   Psychosocial issues? No   Comments Pt wears a dexcom G7 sensor, BS normally range 's, did have a low in the 60's went back up when ate something   Did the patient receive a copy of their discharge instructions? Yes   Nursing interventions Reviewed instructions with patient   What is the patient's perception of their health status since discharge? New symptoms unrelated to diagnosis  [Pt reports he is having some issues with swallowing, eating a soft diet, small bites, is supposed to get his throat stretched in a few weeks]   Is the patient/caregiver able to teach back the hierarchy of who to call/visit for symptoms/problems? PCP, Specialist, Home health nurse, Urgent Care, ED, 911 Yes   Week 2 Call Completed? Yes   Is the patient interested in additional calls from an ambulatory ? No   Would this patient benefit from a Referral  to Amb Social Work? No   Call end time [id number]            Dina S - Registered Nurse

## 2025-06-26 ENCOUNTER — READMISSION MANAGEMENT (OUTPATIENT)
Dept: CALL CENTER | Facility: HOSPITAL | Age: 65
End: 2025-06-26
Payer: COMMERCIAL

## 2025-06-26 NOTE — OUTREACH NOTE
Medical Week 3 Survey      Flowsheet Row Responses   [facility] patient discharged from? [place]   Does the patient have one of the following disease processes/diagnoses(primary or secondary)? Other   Week 3 attempt successful? No   Unsuccessful attempts Attempt 1  [Reached patients sig other but she requested callback.]            DALIA ABAD - Registered Nurse

## 2025-06-30 ENCOUNTER — READMISSION MANAGEMENT (OUTPATIENT)
Dept: CALL CENTER | Facility: HOSPITAL | Age: 65
End: 2025-06-30
Payer: COMMERCIAL

## 2025-06-30 NOTE — OUTREACH NOTE
Medical Week 3 Survey      Flowsheet Row Responses   [facility] patient discharged from? [place]   Does the patient have one of the following disease processes/diagnoses(primary or secondary)? Other   Week 3 attempt successful? Yes   Call start time 1906   Call end time 1908   Discharge diagnosis Syncope   Person spoke with today (if not patient) and relationship pt   Meds reviewed with patient/caregiver? Yes   Is the patient having any side effects they believe may be caused by any medication additions or changes? No   Does the patient have all medications ordered at discharge? Yes   Is the patient taking all medications as directed (includes completed medication regime)? Yes   Does the patient have a primary care provider?  Yes   Does the patient have an appointment with their PCP within 7 days of discharge? Yes   Has the patient kept scheduled appointments due by today? Yes   What is the Home health agency?  [facility]   Has home health visited the patient within 72 hours of discharge? Yes   Psychosocial issues? No   Did the patient receive a copy of their discharge instructions? Yes   Nursing interventions Reviewed instructions with patient   What is the patient's perception of their health status since discharge? Improving   Is the patient/caregiver able to teach back signs and symptoms related to disease process for when to call PCP? Yes   Is the patient/caregiver able to teach back signs and symptoms related to disease process for when to call 911? Yes   Is the patient/caregiver able to teach back the hierarchy of who to call/visit for symptoms/problems? PCP, Specialist, Home health nurse, Urgent Care, ED, 911 Yes   If the patient is a current smoker, are they able to teach back resources for cessation? Not a smoker   Week 3 Call Completed? Yes   Graduated Yes   Is the patient interested in additional calls from an ambulatory ? No   Would this patient benefit from  a Referral to [facility] Social Work? No   Wrap up additional comments pt stated he is doing better.   Call end time 1908            AUDRA DUDLEY - Registered Nurse